# Patient Record
Sex: MALE | Race: WHITE | NOT HISPANIC OR LATINO | Employment: FULL TIME | URBAN - METROPOLITAN AREA
[De-identification: names, ages, dates, MRNs, and addresses within clinical notes are randomized per-mention and may not be internally consistent; named-entity substitution may affect disease eponyms.]

---

## 2017-01-15 ENCOUNTER — HOSPITAL ENCOUNTER (EMERGENCY)
Facility: CLINIC | Age: 42
Discharge: HOME | End: 2017-01-15
Attending: EMERGENCY MEDICINE | Admitting: EMERGENCY MEDICINE
Payer: OTHER MISCELLANEOUS

## 2017-01-15 VITALS
SYSTOLIC BLOOD PRESSURE: 134 MMHG | RESPIRATION RATE: 16 BRPM | DIASTOLIC BLOOD PRESSURE: 83 MMHG | OXYGEN SATURATION: 98 % | TEMPERATURE: 95.5 F

## 2017-01-15 DIAGNOSIS — T14.8XXA SCRATCH: ICD-10-CM

## 2017-01-15 PROCEDURE — 99283 EMERGENCY DEPT VISIT LOW MDM: CPT | Performed by: EMERGENCY MEDICINE

## 2017-01-15 PROCEDURE — 25000132 ZZH RX MED GY IP 250 OP 250 PS 637: Performed by: EMERGENCY MEDICINE

## 2017-01-15 PROCEDURE — 99283 EMERGENCY DEPT VISIT LOW MDM: CPT | Mod: Z6 | Performed by: EMERGENCY MEDICINE

## 2017-01-15 RX ORDER — BACITRACIN ZINC 500 [USP'U]/G
OINTMENT TOPICAL ONCE
Status: COMPLETED | OUTPATIENT
Start: 2017-01-15 | End: 2017-01-15

## 2017-01-15 RX ADMIN — BACITRACIN ZINC: 500 OINTMENT TOPICAL at 12:31

## 2017-01-15 ASSESSMENT — ENCOUNTER SYMPTOMS: WOUND: 1

## 2017-01-15 NOTE — ED AVS SNAPSHOT
[facility], [place], Emergency Department    [address] [place] [address]    Phone:  [phone]    Fax:  [phone]                                       Mialn Carrillo   MRN: [id number]    Department:  [facility], Emergency Department   Date of Visit:  1/15/2017           After Visit Summary Signature Page     I have received my discharge instructions, and my questions have been answered. I have discussed any challenges I see with this plan with the nurse or doctor.    ..........................................................................................................................................  Patient/Patient Representative Signature      ..........................................................................................................................................  Patient Representative Print Name and Relationship to Patient    ..................................................               ................................................  Date                                            Time    ..........................................................................................................................................  Reviewed by Signature/Title    ...................................................              ..............................................  Date                                                            Time

## 2017-01-15 NOTE — ED AVS SNAPSHOT
[facility], Emergency Department    [address]    [place] [address]    Phone:  [phone]    Fax:  [phone]                                       Milan Carrillo   MRN: [id number]    Department:  [facility], Emergency Department   Date of Visit:  1/15/2017           Patient Information     Date Of Birth          1975        Your diagnoses for this visit were:     Scratch        You were seen by Jon Altamirano MD.      Follow-up Information     Follow up with Laney Nino MD.    Specialty:  Student in organized health care education/training program    Why:  As needed    Contact information:    [facility]  [address] [facility] [address] [phone]          Discharge Instructions       Keep the wound clean.  Follow up with your doctor as needed.    24 Hour Appointment Hotline       To make an appointment at any [facility], call [phone] ([phone]). If you don't have a family doctor or clinic, we will help you find one. [facility] are conveniently located to serve the needs of you and your family.             Review of your medicines      Notice     You have not been prescribed any medications.            Orders Needing Specimen Collection     None      Pending Results     No orders found from 1/14/2017 to 1/16/2017.            Pending Culture Results     No orders found from 1/14/2017 to 1/16/2017.            Thank you for choosing [facility]       Thank you for choosing [facility] for your care. Our goal is always to provide you with excellent care. Hearing back from our patients is one way we can continue to improve our services. Please take a few minutes to complete the written survey that you may receive in the mail after you visit with us. Thank you!        [employer] Information     [email or web address] gives you secure access to your electronic health record. If you see a primary care provider, you can also send messages to your care team and  make appointments. If you have questions, please call your primary care clinic.  If you do not have a primary care provider, please call [phone] and they will assist you.        Care EveryWhere ID     This is your Care EveryWhere ID. This could be used by other organizations to access your [facility] medical records  [id number]        After Visit Summary       This is your record. Keep this with you and show to your community pharmacist(s) and doctor(s) at your next visit.

## 2017-01-15 NOTE — ED PROVIDER NOTES
History     Chief Complaint   Patient presents with     Body Fluid Exposure     Patient was part of a Code 21 response and got scratched on right inner arm     HPI  Milan Carrillo is a 41 year old male, with who presents with body fluid exposure. 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 While putting the girl in seclusion, the patient was scratched on the right ventral wrist by the patient's finger nails, which crow blood. The girl who scratched the patient is currently having blood work performed. Here in the ED, patient notes wound to the right wrist where he was scratched, but denies other problems at this time.     Past Medical History   Diagnosis Date     Sleep apnea      Kidney stone        Past Surgical History   Procedure Laterality Date     Eye surgery       Orthopedic surgery       R shoulder     Ent surgery       Recession resection (repair strabismus)  10/1/2012     Procedure: RECESSION RESECTION (REPAIR STRABISMUS);  RIGHT STRABISMUS REPAIR;  Surgeon: Joanie Gurrola MD;  Location: [facility]     Strabismus surgery         Family History   Problem Relation Age of Onset     Glaucoma No family hx of      Macular Degeneration No family hx of        Social History   Substance Use Topics     Smoking status: Never Smoker      Smokeless tobacco: Never Used     Alcohol Use: No       No current facility-administered medications for this encounter.     No current outpatient prescriptions on file.      No Known Allergies       I have reviewed the Medications, Allergies, Past Medical and Surgical History, and Social History in the Epic system.    Review of Systems   Skin: Positive for wound.   All other systems reviewed and are negative.      Physical Exam   BP: 134/83 mmHg  Heart Rate: 107  Temp: 95.5  F (35.3  C)  Resp: 16  SpO2: 98 %  Physical Exam   Constitutional: He appears well-developed and well-nourished. No distress.    HENT:   Head: Normocephalic.   Eyes: Right eye exhibits no discharge. Left eye exhibits no discharge.   Cardiovascular: Normal rate.    No murmur heard.  Pulmonary/Chest: Effort normal. No stridor. No respiratory distress. He has no wheezes.   Abdominal: Soft. He exhibits no distension. There is no tenderness.   Musculoskeletal:        Hands:  Neurological: He is alert. No cranial nerve deficit.   Skin: Skin is warm. He is not diaphoretic.       ED Course   Procedures   12:01 PM  The patient was seen and examined by Jon Altamirano MD in Room 07.       No results found for this or any previous visit (from the past 24 hour(s)).    Assessments & Plan (with Medical Decision Making)   Milan Carrillo is a 41 year old male who is presenting to the ED after he was scratched by a patient. No body fluid exposure. We discuss the low risk of transmission and he does not want any meds. Tetanus is up to date <5 years. Bacitracin applied.    I have reviewed the nursing notes.    I have reviewed the findings, diagnosis, plan and need for follow up with the patient.    There are no discharge medications for this patient.      Final diagnoses:   Scratch       1/15/2017   [facility], EMERGENCY DEPARTMENT    IFletcher, am serving as a trained medical scribe to document services personally performed by Jon Altamirano MD, based on the provider's statements to me.   IAdarsh Anst Bidry, MD, was physically present and have reviewed and verified the accuracy of this note documented by Fletcher Nassar.     Jon Altamirano MD  01/15/17 [id number]    Jon Altamirano MD  01/15/17 7605

## 2017-05-30 ENCOUNTER — OFFICE VISIT (OUTPATIENT)
Dept: FAMILY MEDICINE | Facility: CLINIC | Age: 42
End: 2017-05-30

## 2017-05-30 VITALS
HEIGHT: 72 IN | WEIGHT: 261 LBS | HEART RATE: 94 BPM | DIASTOLIC BLOOD PRESSURE: 89 MMHG | BODY MASS INDEX: 35.35 KG/M2 | TEMPERATURE: 98.9 F | RESPIRATION RATE: 18 BRPM | OXYGEN SATURATION: 98 % | SYSTOLIC BLOOD PRESSURE: 142 MMHG

## 2017-05-30 DIAGNOSIS — Z13.220 SCREENING FOR LIPID DISORDERS: Primary | ICD-10-CM

## 2017-05-30 DIAGNOSIS — K42.9 UMBILICAL HERNIA WITHOUT OBSTRUCTION AND WITHOUT GANGRENE: ICD-10-CM

## 2017-05-30 DIAGNOSIS — Z13.1 SCREENING FOR DIABETES MELLITUS: ICD-10-CM

## 2017-05-30 DIAGNOSIS — Z70.8 SEXUALLY TRANSMITTED DISEASE COUNSELING: ICD-10-CM

## 2017-05-30 LAB — HBA1C MFR BLD: 5.4 % (ref 4.1–5.7)

## 2017-05-30 NOTE — PATIENT INSTRUCTIONS
Here is the plan from today's visit    1. Screening for lipid disorders  We will check your cholesterol today.  - Lipid Cascade ([place])    2. Screening for diabetes mellitus  We will check you for diabetes today (and your risk of getting diabetes)  - Hemoglobin A1c ([place])    3. Sexually transmitted disease counseling  We will check you for STIs today.  - Neisseria gonorrhoeae PCR  - Chlamydia trachomatis PCR  - HIV Antigen Antibody Combo  - Anti Treponema      4. Umbilical hernia without obstruction and without gangrene  I have put in a referral to general surgery.  They will call you to make an appointment.  - GENERAL SURGERY ADULT REFERRAL - INTERNAL    Follow up plan  Please make a clinic appointment for follow up with me (SHIN NORWOOD) as needed.  If your blood pressure continues to be elevated, please return to clinic.  The top number should be less than 140 and bottom number less than 90.  I do not recommend taking the phenteramine for more 6 months.      Thank you for coming to [facility] today.  Lab Testing:  **If you had lab testing today and your results are reassuring or normal they will be mailed to you or sent through [employer] within 7 days.   **If the lab tests need quick action we will call you with the results.  The phone number we will call with results is # [phone] (home) [phone] (work). If this is not the best number please call our clinic and change the number.  Medication Refills:  If you need any refills please call your pharmacy and they will contact us.   If you need to  your refill at a new pharmacy, please contact the new pharmacy directly. The new pharmacy will help you get your medications transferred faster.   Scheduling:  If you have any concerns about today's visit or wish to schedule another appointment please call our office during normal business hours [phone] (8-5:00 M-F)  If a referral was made to a [facility] and  you don't get a call from central scheduling please call [phone].  If a Mammogram was ordered for you at [facility] call [phone] to schedule or change your appointment.  If you had an XRay/CT/Ultrasound/MRI ordered the number is [phone] to schedule or change your radiology appointment.   Medical Concerns:  If you have urgent medical concerns please call [phone] at any time of the day.    Preventive Health Recommendations  Male Ages 40 to 49    Yearly exam:             See your health care provider every year in order to  o   Review health changes.   o   Discuss preventive care.    o   Review your medicines if your doctor has prescribed any.    You should be tested each year for STDs (sexually transmitted diseases) if you re at risk.     Have a cholesterol test every 5 years.     Have a colonoscopy (test for colon cancer) if someone in your family has had colon cancer or polyps before age 50.     After age 45, have a diabetes test (fasting glucose). If you are at risk for diabetes, you should have this test every 3 years.      Talk with your health care provider about whether or not a prostate cancer screening test (PSA) is right for you.    Shots: Get a flu shot each year. Get a tetanus shot every 10 years.     Nutrition:    Eat at least 5 servings of fruits and vegetables daily.     Eat whole-grain bread, whole-wheat pasta and brown rice instead of white grains and rice.     Talk to your provider about Calcium and Vitamin D.     Lifestyle    Exercise for at least 150 minutes a week (30 minutes a day, 5 days a week). This will help you control your weight and prevent disease.     Limit alcohol to one drink per day.     No smoking.     Wear sunscreen to prevent skin cancer.     See your dentist every six months for an exam and cleaning.      General Surgery referral  Hello!           Contacted pt twice, left 2 VM's.          Thanks!     [place]     Sent letter to patient home

## 2017-05-30 NOTE — MR AVS SNAPSHOT
After Visit Summary   5/30/2017    Milan Carrillo    MRN: [id number]           Patient Information     Date Of Birth          1975        Visit Information        Provider Department      5/30/2017 4:20 PM Laney Norwood MD [facility]        Today's Diagnoses     Screening for lipid disorders    -  1    Screening for diabetes mellitus        Sexually transmitted disease counseling        Umbilical hernia without obstruction and without gangrene          Care Instructions    Here is the plan from today's visit    1. Screening for lipid disorders  We will check your cholesterol today.  - Lipid Cascade ([place])    2. Screening for diabetes mellitus  We will check you for diabetes today (and your risk of getting diabetes)  - Hemoglobin A1c ([facility])    3. Sexually transmitted disease counseling  We will check you for STIs today.  - Neisseria gonorrhoeae PCR  - Chlamydia trachomatis PCR  - HIV Antigen Antibody Combo  - Anti Treponema      4. Umbilical hernia without obstruction and without gangrene  I have put in a referral to general surgery.  They will call you to make an appointment.  - GENERAL SURGERY ADULT REFERRAL - INTERNAL    Follow up plan  Please make a clinic appointment for follow up with me (LANEY NORWOOD) as needed.  If your blood pressure continues to be elevated, please return to clinic.  The top number should be less than 140 and bottom number less than 90.  I do not recommend taking the phenteramine for more 6 months.      Thank you for coming to [facility] today.  Lab Testing:  **If you had lab testing today and your results are reassuring or normal they will be mailed to you or sent through [employer] within 7 days.   **If the lab tests need quick action we will call you with the results.  The phone number we will call with results is # [phone] (home) [phone] (work). If this is not the best number please call our clinic and change  the number.  Medication Refills:  If you need any refills please call your pharmacy and they will contact us.   If you need to  your refill at a new pharmacy, please contact the new pharmacy directly. The new pharmacy will help you get your medications transferred faster.   Scheduling:  If you have any concerns about today's visit or wish to schedule another appointment please call our office during normal business hours [phone] (8-5:00 M-F)  If a referral was made to a [facility] and you don't get a call from central scheduling please call [phone].  If a Mammogram was ordered for you at [facility] call [phone] to schedule or change your appointment.  If you had an XRay/CT/Ultrasound/MRI ordered the number is [phone] to schedule or change your radiology appointment.   Medical Concerns:  If you have urgent medical concerns please call [phone] at any time of the day.    Preventive Health Recommendations  Male Ages 40 to 49    Yearly exam:             See your health care provider every year in order to  o   Review health changes.   o   Discuss preventive care.    o   Review your medicines if your doctor has prescribed any.    You should be tested each year for STDs (sexually transmitted diseases) if you re at risk.     Have a cholesterol test every 5 years.     Have a colonoscopy (test for colon cancer) if someone in your family has had colon cancer or polyps before age 50.     After age 45, have a diabetes test (fasting glucose). If you are at risk for diabetes, you should have this test every 3 years.      Talk with your health care provider about whether or not a prostate cancer screening test (PSA) is right for you.    Shots: Get a flu shot each year. Get a tetanus shot every 10 years.     Nutrition:    Eat at least 5 servings of fruits and vegetables daily.     Eat whole-grain bread, whole-wheat pasta and brown rice instead of white grains and rice.      Talk to your provider about Calcium and Vitamin D.     Lifestyle    Exercise for at least 150 minutes a week (30 minutes a day, 5 days a week). This will help you control your weight and prevent disease.     Limit alcohol to one drink per day.     No smoking.     Wear sunscreen to prevent skin cancer.     See your dentist every six months for an exam and cleaning.              Follow-ups after your visit        Additional Services     GENERAL SURGERY ADULT REFERRAL - INTERNAL       Your provider has referred you to: [facility]: General Surgery Clinic [facility] [phone]   [email or web address]    Please be aware that coverage of these services is subject to the terms and limitations of your health insurance plan.  Call member services at your health plan with any benefit or coverage questions.      Please bring the following with you to your appointment:    (1) Any X-Rays, CTs or MRIs which have been performed.  Contact the facility where they were done to arrange for  prior to your scheduled appointment.   (2) List of current medications   (3) This referral request   (4) Any documents/labs given to you for this referral                  Who to contact     Please call your clinic at [phone] to:    Ask questions about your health    Make or cancel appointments    Discuss your medicines    Learn about your test results    Speak to your doctor   If you have compliments or concerns about an experience at your clinic, or if you wish to file a complaint, please contact [facility] Patient Relations at [phone] or email us at [email or web address]         Additional Information About Your Visit        [employer] Information     [email or web address] gives you secure access to your electronic health record. If you see a primary care provider, you can also send messages to your care team and make appointments. If you have questions, please call  your primary care clinic.  If you do not have a primary care provider, please call [phone] and they will assist you.      [employer] is an electronic gateway that provides easy, online access to your medical records. With [employer], you can request a clinic appointment, read your test results, renew a prescription or communicate with your care team.     To access your existing account, please contact your [facility] or call [phone] for assistance.        Care EveryWhere ID     This is your Care EveryWhere ID. This could be used by other organizations to access your [place] medical records  [id number]        Your Vitals Were     Pulse Temperature Respirations Height Pulse Oximetry BMI (Body Mass Index)    94 98.9  F (37.2  C) (Oral) 18 6' (182.9 cm) 98% 35.4 kg/m2       Blood Pressure from Last 3 Encounters:   05/30/17 142/89   01/15/17 134/83   03/31/16 (!) 150/99    Weight from Last 3 Encounters:   05/30/17 261 lb (118.4 kg)   02/23/16 264 lb (119.7 kg)   09/18/15 261 lb 12.8 oz (118.8 kg)              We Performed the Following     Anti Treponema     Chlamydia trachomatis PCR     GENERAL SURGERY ADULT REFERRAL - INTERNAL     Hemoglobin A1c ([place])     HIV Antigen Antibody Combo     Lipid [place] ([place])     Neisseria gonorrhoeae PCR        Primary Care Provider Office Phone # Fax #    Laney Nino [phone]       [facility] [address] [facility] [address]        Thank you!     Thank you for choosing [facility]  for your care. Our goal is always to provide you with excellent care. Hearing back from our patients is one way we can continue to improve our services. Please take a few minutes to complete the written survey that you may receive in the mail after your visit with us. Thank you!             Your Updated Medication List - Protect others around you: Learn how to safely use, store and throw away  your medicines at www.disposemymeds.org.      Notice  As of 5/30/2017  5:08 PM    You have not been prescribed any medications.

## 2017-05-30 NOTE — PROGRESS NOTES
Preceptor Attestation:   Patient seen and discussed with the resident. Assessment and plan reviewed with resident and agreed upon.   Supervising Physician:  Reena Butterfield MD  [facility]

## 2017-05-30 NOTE — PROGRESS NOTES
"  Male Physical Note          HPI         Concerns today: Worried about Mom's disease (autoimmune disease).  Would like to get tested, but not sure what her diagnosis was.  Patient has no specific complaints currently.      Patient is complaining of dysuria that has been going on for a few days (3 days).  Last partner was \"high risk\".  Patient thinks he may have been exposed to a STI.  Patient has had similar symptoms in the past (negative STI testing) that went away on its own.  No penile discharge, no rashes.        Patient Active Problem List   Diagnosis     Libido, decreased     Constipation     Malaise and fatigue     Hyperlipidemia     Obesity     Nonspecific elevation of levels of transaminase or lactic acid dehydrogenase (LDH)       Past Medical History:   Diagnosis Date     Kidney stone      Sleep apnea        Previous Medical Care      Family History   Problem Relation Age of Onset     Glaucoma No family hx of      Macular Degeneration No family hx of               Review of Systems:     Review of Systems:  CONSTITUTIONAL: NEGATIVE for fever, chills, change in weight  INTEGUMENTARY/SKIN: NEGATIVE for worrisome rashes, moles or lesions  EYES: NEGATIVE for vision changes or irritation.  Poor vision in right eye  ENT/MOUTH: NEGATIVE for ear, mouth and throat problems  RESP: NEGATIVE for significant cough or SOB  CV: NEGATIVE for chest pain, palpitations or peripheral edema  GI: NEGATIVE for nausea, abdominal pain, heartburn, or change in bowel habits  : NEGATIVE for frequency, or hematuria  MUSCULOSKELETAL: NEGATIVE for significant arthralgias or myalgia  NEURO: NEGATIVE for weakness, dizziness or paresthesias  ENDOCRINE: NEGATIVE for temperature intolerance, skin/hair changes  HEME/ALLERGY: NEGATIVE for bleeding problems  PSYCHIATRIC: NEGATIVE for changes in mood or affect    Sleep:   Do you snore most or the night (as reported by a family member)? Yes.  Patient has CPAP, but doesn't use it.  Do you feel " "sleepy or extremely tired during most of the day? No         Social History     Social History     Social History     Marital status:      Spouse name: N/A     Number of children: N/A     Years of education: N/A     Occupational History     Not on file.     Social History Main Topics     Smoking status: Never Smoker     Smokeless tobacco: Never Used     Alcohol use No     Drug use: No     Sexual activity: Not on file     Other Topics Concern     Not on file     Social History Narrative       Marital Status:  Who lives in your household? Lives alone with kids half time (ages 8 and 5)    Has anyone hurt you physically, for example by pushing, hitting, slapping or kicking you or forcing you to have sex? Denies  Do you feel threatened or controlled by a partner, ex-partner or anyone in your life? Denies    Sexual Health     Sexual concerns: No   STI History: Neg    Recommended Screening   Recommend STI testing, HIV testing, syphilis testing,   Patient requested cholesterol and A1c testing.  States tests were \"borderline\" last year.         Physical Exam:     Vitals: /89  Pulse 94  Temp 98.9  F (37.2  C) (Oral)  Resp 18  Ht 6' (182.9 cm)  Wt 261 lb (118.4 kg)  SpO2 98%  BMI 35.4 kg/m2  BMI= Body mass index is 35.4 kg/(m^2).  GENERAL: healthy, alert and no distress  EYES: Eyes grossly normal to inspection, extraocular movements - intact, and PERRL.  Right eye has erythema and scarring of medial sclera.    HENT: ear canals- normal; TMs- normal; Nose- normal; Mouth- no ulcers, no lesions  NECK: no tenderness, no adenopathy, no asymmetry, no masses, no stiffness; thyroid- normal to palpation  RESP: lungs clear to auscultation - no rales, no rhonchi, no wheezes  CV: regular rates and rhythm, normal S1 S2, no S3 or S4 and no murmur  ABDOMEN: soft, no tenderness, no  hepatosplenomegaly, no masses, normal bowel sounds.  Small reducible hernia around umbilicus.  MS: extremities- no gross deformities " noted, no edema  SKIN: no suspicious lesions, no rashes  NEURO: strength and tone- normal, sensory exam- grossly normal, mentation- intact, speech- normal, reflexes- symmetric  BACK: no CVA tenderness, no paralumbar tenderness  - genital exam deferred  RECTAL- exam deferred  PSYCH: Alert and oriented times 3; speech- coherent , normal rate and volume; able to articulate logical thoughts, able to abstract reason, no tangential thoughts, no hallucinations or delusions, affect- normal  LYMPHATICS: ant. cervical- normal, post. cervical- normal, axillary- normal, supraclavicular- normal, inguinal- normal    Assessment and Plan     Milan was seen today for physical.    Milan had no specific complaints other than concerns about a STI.  He does have dysuria, but no penile discharge.  Differential includes STI (GC/CT).  Patient may also have herpes or syphilis, but this would be less likely to present with dysuria.  A UTI seems unlikely since patient is a male, but would consider UA/urine culture if STI testing is negative.    Patient also requested cholesterol testing and A1c testing.  Will perform today per request.  Patient also requested a surgery referral to have his umbilical hernia repaired.      Patient noted to be slightly hypertensive during his clinic visit.  He states he was nervous about being late.  He goes to a clinic to get phenteramine (for weight loss) and states it is normally 120s/70s there.  I advised him to continue checking his blood pressure and RTC if it remains elevated (>140/90).  I also recommended not to take phenteramine long term, as this is most likely contributing to his elevated blood pressure.    Diagnoses and all orders for this visit:    Screening for lipid disorders  -     Lipid [place] ([facility])    Screening for diabetes mellitus  -     Hemoglobin A1c ([facility])    Sexually transmitted disease counseling  -     Neisseria gonorrhoeae PCR  -     Chlamydia trachomatis PCR  -     HIV  Antigen Antibody Combo  -     Anti Treponema    Umbilical hernia without obstruction and without gangrene  -     GENERAL SURGERY ADULT REFERRAL - INTERNAL    Options for treatment and follow-up care were reviewed with the patient. Milan Carrillo and/or guardian engaged in the decision making process and verbalized understanding of the options discussed and agreed with the final plan.    Laney Nino M.D.  PGY-2, Family Medicine    Will plan to release results into [employer]

## 2017-05-30 NOTE — LETTER
Nancie 3, 2017      Milan Carrillo  [address] [place] [address]        Dear Milan,    Thank you for getting your care at [facility]. Please see below for your test results.    Resulted Orders   Lipid [place] ([facility])   Result Value Ref Range    Cholesterol 206.7 (H) 0.0 - 200.0 mg/dL    Cholesterol/HDL Ratio 5.9 (H) 0.0 - 5.0    HDL Cholesterol 35.1 (L) >40.0 mg/dL    LDL Cholesterol Calculated 124 0 - 129 mg/dL    Triglycerides 237.5 (H) 0.0 - 150.0 mg/dL    VLDL Cholesterol 47.5 (H) 7.0 - 32.0 mg/dL   Hemoglobin A1c ([facility])   Result Value Ref Range    Hemoglobin A1C 5.4 4.1 - 5.7 %   Neisseria gonorrhoeae PCR   Result Value Ref Range    Specimen Descrip Urine     N Gonorrhea PCR  NEG     Negative   Negative for N. gonorrhoeae rRNA by transcription mediated amplification.   A negative result by transcription mediated amplification does not preclude the   presence of N. gonorrhoeae infection because results are dependent on proper   and adequate collection, absence of inhibitors, and sufficient rRNA to be   detected.     Chlamydia trachomatis PCR   Result Value Ref Range    Specimen Description Urine     Chlamydia Trachomatis PCR  NEG     Negative   Negative for C. trachomatis rRNA by transcription mediated amplification.   A negative result by transcription mediated amplification does not preclude the   presence of C. trachomatis infection because results are dependent on proper   and adequate collection, absence of inhibitors, and sufficient rRNA to be   detected.     HIV Antigen Antibody Combo   Result Value Ref Range    HIV Antigen Antibody Combo  NR     Nonreactive   HIV-1 p24 Ag & HIV-1/HIV-2 Ab Not Detected     Anti Treponema   Result Value Ref Range    Treponema pallidum Antibody Negative NEG     Your STI testing was negative.  If you continue to have urinary symptoms, please return to clinic.    Your diabetes test was normal, but your cholesterol was mildly elevated.  I recommend diet  and exercise for now, but we can recheck next year.  There is no need to start medications now.      If you have any concerns about these results please call and leave a message for me or send a Phizzlet message to the clinic.    Sincerely,    Laney Nino MD

## 2017-05-31 LAB
CHOLEST SERPL-MCNC: 206.7 MG/DL (ref 0–200)
CHOLEST/HDLC SERPL: 5.9 {RATIO} (ref 0–5)
HDLC SERPL-MCNC: 35.1 MG/DL
LDLC SERPL CALC-MCNC: 124 MG/DL (ref 0–129)
TRIGL SERPL-MCNC: 237.5 MG/DL (ref 0–150)
VLDL CHOLESTEROL: 47.5 MG/DL (ref 7–32)

## 2017-06-01 LAB
C TRACH DNA SPEC QL NAA+PROBE: NORMAL
HIV 1+2 AB+HIV1 P24 AG SERPL QL IA: NORMAL
N GONORRHOEA DNA SPEC QL NAA+PROBE: NORMAL
SPECIMEN SOURCE: NORMAL
SPECIMEN SOURCE: NORMAL
T PALLIDUM IGG+IGM SER QL: NEGATIVE

## 2019-02-07 ENCOUNTER — HOSPITAL ENCOUNTER (EMERGENCY)
Facility: CLINIC | Age: 44
Discharge: HOME | End: 2019-02-07
Attending: EMERGENCY MEDICINE | Admitting: EMERGENCY MEDICINE
Payer: OTHER MISCELLANEOUS

## 2019-02-07 VITALS
OXYGEN SATURATION: 93 % | TEMPERATURE: 97.9 F | HEART RATE: 74 BPM | RESPIRATION RATE: 18 BRPM | DIASTOLIC BLOOD PRESSURE: 94 MMHG | SYSTOLIC BLOOD PRESSURE: 128 MMHG

## 2019-02-07 DIAGNOSIS — S60.811A ABRASION OF RIGHT WRIST: ICD-10-CM

## 2019-02-07 DIAGNOSIS — Z57.8 EMPLOYEE EXPOSURE TO BLOOD: ICD-10-CM

## 2019-02-07 DIAGNOSIS — S61.216A LACERATION OF RIGHT LITTLE FINGER WITHOUT FOREIGN BODY WITHOUT DAMAGE TO NAIL: ICD-10-CM

## 2019-02-07 DIAGNOSIS — Z23 NEED FOR PROPHYLACTIC VACCINATION AND INOCULATION AGAINST CHOLERA ALONE: ICD-10-CM

## 2019-02-07 PROCEDURE — 90471 IMMUNIZATION ADMIN: CPT

## 2019-02-07 PROCEDURE — 90715 TDAP VACCINE 7 YRS/> IM: CPT | Performed by: EMERGENCY MEDICINE

## 2019-02-07 PROCEDURE — 99282 EMERGENCY DEPT VISIT SF MDM: CPT | Mod: 25

## 2019-02-07 PROCEDURE — 25000128 H RX IP 250 OP 636: Performed by: EMERGENCY MEDICINE

## 2019-02-07 PROCEDURE — 99283 EMERGENCY DEPT VISIT LOW MDM: CPT | Mod: Z6 | Performed by: EMERGENCY MEDICINE

## 2019-02-07 RX ORDER — PHENTERMINE HYDROCHLORIDE 37.5 MG/1
37.5 TABLET ORAL
COMMUNITY
End: 2021-07-26

## 2019-02-07 RX ADMIN — CLOSTRIDIUM TETANI TOXOID ANTIGEN (FORMALDEHYDE INACTIVATED), CORYNEBACTERIUM DIPHTHERIAE TOXOID ANTIGEN (FORMALDEHYDE INACTIVATED), BORDETELLA PERTUSSIS TOXOID ANTIGEN (GLUTARALDEHYDE INACTIVATED), BORDETELLA PERTUSSIS FILAMENTOUS HEMAGGLUTININ ANTIGEN (FORMALDEHYDE INACTIVATED), BORDETELLA PERTUSSIS PERTACTIN ANTIGEN, AND BORDETELLA PERTUSSIS FIMBRIAE 2/3 ANTIGEN 0.5 ML: 5; 2; 2.5; 5; 3; 5 INJECTION, SUSPENSION INTRAMUSCULAR at 21:31

## 2019-02-07 RX ADMIN — Medication 1 PACKAGE: at 22:18

## 2019-02-07 SDOH — HEALTH STABILITY - PHYSICAL HEALTH: OCCUPATIONAL EXPOSURE TO OTHER RISK FACTORS: Z57.8

## 2019-02-07 ASSESSMENT — ENCOUNTER SYMPTOMS
NUMBNESS: 0
WEAKNESS: 0
WOUND: 1

## 2019-02-07 NOTE — ED AVS SNAPSHOT
[facility], [place], Emergency Department  [address]  [place] [address]  Phone:  [phone]  Fax:  [phone]                                    Milan Carrillo   MRN: [id number]    Department:  [facility], Emergency Department   Date of Visit:  2/7/2019           After Visit Summary Signature Page    I have received my discharge instructions, and my questions have been answered. I have discussed any challenges I see with this plan with the nurse or doctor.    ..........................................................................................................................................  Patient/Patient Representative Signature      ..........................................................................................................................................  Patient Representative Print Name and Relationship to Patient    ..................................................               ................................................  Date                                   Time    ..........................................................................................................................................  Reviewed by Signature/Title    ...................................................              ..............................................  Date                                               Time          22EPIC Rev 08/18

## 2019-02-08 NOTE — ED PROVIDER NOTES
History     Chief Complaint   Patient presents with     Hand Pain     right pinky finger injury     HPI  Milan Carrillo is a 43 year old right-hand dominant male who presents to the emergency department with a right fifth finger and right wrist wound after a Code 21 situation on a health unit today.  The patient states that he was scratched through his glove hand on the right fifth finger.  He also sustained a scratch on the right wrist that was not covered by a glove.  The source patient is diabetic and has undergone fingerstick testing.  There is concerned that the patient may have been exposed to the source patient's blood during the scratching incident.  The patient did undergo hep B series.  He  was considered immune to infection with hepatitis B by testing on 6/2/10.  The source patien did have a negative HIV test in September 2017 and has no known history of IV drug use.  She is not consenting to a blood draw.    I have reviewed the Medications, Allergies, Past Medical and Surgical History, and Social History in the Epic system.    Review of Systems   Skin: Positive for wound.   Neurological: Negative for weakness and numbness.   All other systems reviewed and are negative.      Physical Exam   BP: 134/90  Pulse: 74  Heart Rate: 84  Temp: 96.1  F (35.6  C)  Resp: 18  SpO2: 95 %      Physical Exam   Constitutional: He appears well-developed and well-nourished.   Cardiovascular: Intact distal pulses.   Musculoskeletal:        Right hand: He exhibits normal range of motion and normal capillary refill. Normal sensation noted. Normal strength noted.        Hands:  Neurological: He has normal strength. No sensory deficit.   Skin: Capillary refill takes less than 2 seconds.   Nursing note and vitals reviewed.      ED Course        Procedures            Critical Care time:     Nursing supervisor here.  Source patient not consenting to blood draw.    Discussed with ID staff.  Will initiate postexposure prophylaxis  until source patient can be drawn.           Assessments & Plan (with Medical Decision Making)   43 year old right-hand-dominant male to the emergency department with 2 superficial cutaneous injury sustained during a code 21 this evening.  The injury on his fifth finger was through a gloved hand with no violation of the glove.  The injury on his wrist was direct patient contact.  There is concerned that the patient may have had her blood on her fingers that she is a diabetic and has undergone fingerstick glucose testing.  Additionally, the source patient is not consenting to blood draw for rapid HIV testing.  The patient is hep B immune.  He was offered in and initiated postexposure prophylaxis here in the emergency department.  He was provided a starter kit.  He will follow-up with employee health tomorrow.    I have reviewed the nursing notes.    I have reviewed the findings, diagnosis, plan and need for follow up with the patient.       Medication List      Started    emtricitabine-tenofovir (TRUVADA) 200 mg-300 mg PLUS dolutegravir (TIVICAY) 50 mg Tabs ED starter pack  1 Package, As instructed, ONCE            Final diagnoses:   Employee exposure to blood       2/7/2019   [facility], [place], EMERGENCY DEPARTMENT     Kavon Cueva MD  02/07/19 [id number]

## 2019-02-08 NOTE — ED TRIAGE NOTES
Pt is an employee here and was recently in a code 21 on [employer]. Right pinky finger got injured during the code.

## 2019-02-08 NOTE — DISCHARGE INSTRUCTIONS
Take Truvada and Tivicay as directed.  Follow-up with Employee Health tomorrow.    Return to the ED if any concerns.

## 2019-06-17 ENCOUNTER — HOSPITAL ENCOUNTER (EMERGENCY)
Facility: CLINIC | Age: 44
Discharge: HOME | End: 2019-06-17
Attending: EMERGENCY MEDICINE | Admitting: EMERGENCY MEDICINE
Payer: COMMERCIAL

## 2019-06-17 ENCOUNTER — APPOINTMENT (OUTPATIENT)
Dept: GENERAL RADIOLOGY | Facility: CLINIC | Age: 44
End: 2019-06-17
Attending: EMERGENCY MEDICINE
Payer: COMMERCIAL

## 2019-06-17 VITALS
SYSTOLIC BLOOD PRESSURE: 124 MMHG | BODY MASS INDEX: 37.32 KG/M2 | RESPIRATION RATE: 16 BRPM | WEIGHT: 275.19 LBS | TEMPERATURE: 97 F | OXYGEN SATURATION: 95 % | DIASTOLIC BLOOD PRESSURE: 82 MMHG | HEART RATE: 66 BPM

## 2019-06-17 DIAGNOSIS — S46.212A TEAR OF LEFT BICEPS MUSCLE, INITIAL ENCOUNTER: ICD-10-CM

## 2019-06-17 PROCEDURE — 99283 EMERGENCY DEPT VISIT LOW MDM: CPT | Performed by: EMERGENCY MEDICINE

## 2019-06-17 PROCEDURE — 99284 EMERGENCY DEPT VISIT MOD MDM: CPT | Mod: Z6 | Performed by: EMERGENCY MEDICINE

## 2019-06-17 PROCEDURE — 25000132 ZZH RX MED GY IP 250 OP 250 PS 637: Performed by: EMERGENCY MEDICINE

## 2019-06-17 PROCEDURE — 73080 X-RAY EXAM OF ELBOW: CPT | Mod: LT

## 2019-06-17 RX ORDER — OXYCODONE HYDROCHLORIDE 5 MG/1
5 TABLET ORAL ONCE
Status: COMPLETED | OUTPATIENT
Start: 2019-06-17 | End: 2019-06-17

## 2019-06-17 RX ORDER — OXYCODONE HYDROCHLORIDE 5 MG/1
5 TABLET ORAL EVERY 6 HOURS PRN
Qty: 12 TABLET | Refills: 0 | Status: SHIPPED | OUTPATIENT
Start: 2019-06-17 | End: 2020-05-11

## 2019-06-17 RX ORDER — IBUPROFEN 200 MG
600 TABLET ORAL 3 TIMES DAILY
Qty: 45 TABLET | Refills: 0 | Status: SHIPPED | OUTPATIENT
Start: 2019-06-17 | End: 2019-07-01

## 2019-06-17 RX ADMIN — OXYCODONE HYDROCHLORIDE 5 MG: 5 TABLET ORAL at 17:29

## 2019-06-17 NOTE — ED PROVIDER NOTES
History     Chief Complaint   Patient presents with     Arm Injury     States he was moving furniture today and heard a pop in his left elbow.  Staes he can feel his fingers     HPI  Milan Carrillo is a 44 year old male who states that he was lifting a mattress over his head in order to flip it over. Patient states that he was lifting it with his left arm while carrying it above his head when he felt pain in his left elbow medially. Patient states that the pain has persisted over the last 12 hours and he now presents to the ER for evaluation. Patient denies any other injury.     This part of the medical record was transcribed by Denise Barboza, Medical Scribe, from a dictation done by Srikanth Chacko MD.      I have reviewed the Medications, Allergies, Past Medical and Surgical History, and Social History in the [employer] system.    Past Medical History:   Diagnosis Date     Kidney stone      Sleep apnea        Past Surgical History:   Procedure Laterality Date     ENT SURGERY       EYE SURGERY       ORTHOPEDIC SURGERY      R shoulder     RECESSION RESECTION (REPAIR STRABISMUS)  10/1/2012    Procedure: RECESSION RESECTION (REPAIR STRABISMUS);  RIGHT STRABISMUS REPAIR;  Surgeon: Joanie Gurrola MD;  Location: [facility]     STRABISMUS SURGERY         Family History   Problem Relation Age of Onset     Glaucoma No family hx of      Macular Degeneration No family hx of        Social History     Tobacco Use     Smoking status: Never Smoker     Smokeless tobacco: Never Used   Substance Use Topics     Alcohol use: No        No Known Allergies    Review of Systems   Musculoskeletal:        Positive for left elbow pain.   All other systems reviewed and are negative.      Physical Exam   BP: 124/82  Pulse: 66  Temp: 97  F (36.1  C)  Resp: 16  Weight: 124.8 kg (275 lb 3 oz)  SpO2: 95 %      Physical Exam   Constitutional: He is oriented to person, place, and time.   Alert conversant and in some mild distress secondary to left elbow  pain   HENT:   Head: Atraumatic.   Eyes: Pupils are equal, round, and reactive to light. EOM are normal.   Neck: Neck supple.   Pulmonary/Chest: No respiratory distress.   Musculoskeletal:   Patient has some tenderness of the distal portion of the biceps in the antecubital fossa with some swelling more proximal to that but no clear disruption.  There is a suspected partial disruption.  Patient's elbow flexion is intact.  Distal CMS intact.   Neurological: He is alert and oriented to person, place, and time.   Skin: Skin is warm.   Psychiatric: He has a normal mood and affect.   Nursing note and vitals reviewed.      ED Course        Procedures        Results for orders placed or performed during the hospital encounter of 06/17/19   Elbow  XR, G/E 3 views, left    Narrative    LEFT ELBOW THREE VIEWS  6/17/2019 4:40 PM     HISTORY: Trauma.    COMPARISON: None.      Impression    IMPRESSION: Normal.    ITALO ALBERT MD       Assessments & Plan (with Medical Decision Making)     I have reviewed the nursing notes.    Medications   oxyCODONE (ROXICODONE) tablet 5 mg (5 mg Oral Given 6/17/19 1729)     Orthopedic referral ordered and MRI as an outpatient ordered    I have reviewed the findings, diagnosis, plan and need for follow up with the patient.       Medication List      Started    ibuprofen 200 MG tablet  Commonly known as:  ADVIL/MOTRIN  600 mg, Oral, 3 TIMES DAILY     oxyCODONE 5 MG tablet  Commonly known as:  ROXICODONE  5 mg, Oral, EVERY 6 HOURS PRN            Final diagnoses:   Tear of left biceps muscle, initial encounter - partial     Wear sling on your left arm for comfort.    Work note given    Ice as needed.    Call MRI at [phone] to schedule an outpatient MRI this week.    Please make an appointment to follow up with Orthopedics (phone: [phone]) to occur at least a day after your MRI for results and recheck.    Srikanth Chacko MD    6/17/2019   [facility], [place], EMERGENCY DEPARTMENT      Srikanth Chacko MD  06/17/19 8771

## 2019-06-17 NOTE — DISCHARGE INSTRUCTIONS
Wear sling on your left arm for comfort.    Ice as needed.    Call MRI at [phone] to schedule an outpatient MRI this week.    Please make an appointment to follow up with Orthopedics (phone: [phone]) to occur at least a day after your MRI for results and recheck.

## 2019-06-17 NOTE — ED AVS SNAPSHOT
[facility], [place], Emergency Department  [address]  [place] [address]  Phone:  [phone]  Fax:  [phone]                                    Milan Carrillo   MRN: [id number]    Department:  [facility], Emergency Department   Date of Visit:  6/17/2019           After Visit Summary Signature Page    I have received my discharge instructions, and my questions have been answered. I have discussed any challenges I see with this plan with the nurse or doctor.    ..........................................................................................................................................  Patient/Patient Representative Signature      ..........................................................................................................................................  Patient Representative Print Name and Relationship to Patient    ..................................................               ................................................  Date                                   Time    ..........................................................................................................................................  Reviewed by Signature/Title    ...................................................              ..............................................  Date                                               Time          22EPIC Rev 08/18

## 2019-06-17 NOTE — LETTER
June 17, 2019      To Whom It May Concern:      Milan Carrillo was seen in our Emergency Department today, 06/17/19.  I expect his condition to improve over the next week.  He may return to work/school on 6/19/19 but will be unable to use his left arm for 1 week.    Sincerely,        Srikanth Chacko MD, MD

## 2019-06-19 ENCOUNTER — TELEPHONE (OUTPATIENT)
Dept: ORTHOPEDICS | Facility: CLINIC | Age: 44
End: 2019-06-19

## 2019-06-19 NOTE — TELEPHONE ENCOUNTER
[facility] Call Center    Phone Message    May a detailed message be left on voicemail: yes    Reason for Call: Order(s): Other:   Reason for requested: MRI of bicep- pt is making sure that his MRI on 06/25 will be for his elbow AND bicep  Date needed: asap  Provider name:       Action Taken: Message routed to:  [facility] ([facility] ortho

## 2019-06-19 NOTE — TELEPHONE ENCOUNTER
RECORDS RECEIVED FROM: Internal Referral from Srikanth Chacko MD for Tear of left biceps muscle, initial encounter - Schedule Per PT    DATE RECEIVED: Jul 1, 2019    NOTES STATUS DETAILS   OFFICE NOTE from referring provider N/A    OFFICE NOTE from other specialist N/A    DISCHARGE SUMMARY from hospital N/A    DISCHARGE REPORT from the ER Internal 6/17/19   OPERATIVE REPORT N/A    MEDICATION LIST Internal    IMPLANT RECORD/STICKER N/A    LABS     CBC/DIFF N/A    CULTURES N/A    INJECTIONS DONE IN RADIOLOGY N/A    MRI In process, internal Scheduled for 6/25/19   CT SCAN N/A    XRAYS (IMAGES & REPORTS) Internal 6/17/19   TUMOR     PATHOLOGY  Slides & report N/A    '

## 2019-06-25 ENCOUNTER — HOSPITAL ENCOUNTER (OUTPATIENT)
Dept: MRI IMAGING | Facility: CLINIC | Age: 44
Discharge: HOME | End: 2019-06-25
Attending: EMERGENCY MEDICINE | Admitting: EMERGENCY MEDICINE
Payer: COMMERCIAL

## 2019-06-25 DIAGNOSIS — S46.212A TEAR OF LEFT BICEPS MUSCLE, INITIAL ENCOUNTER: ICD-10-CM

## 2019-06-25 PROCEDURE — 73221 MRI JOINT UPR EXTREM W/O DYE: CPT | Mod: LT

## 2019-07-01 ENCOUNTER — PRE VISIT (OUTPATIENT)
Dept: ORTHOPEDICS | Facility: CLINIC | Age: 44
End: 2019-07-01

## 2019-07-01 ENCOUNTER — OFFICE VISIT (OUTPATIENT)
Dept: ORTHOPEDICS | Facility: CLINIC | Age: 44
End: 2019-07-01
Attending: EMERGENCY MEDICINE
Payer: COMMERCIAL

## 2019-07-01 ENCOUNTER — HOSPITAL ENCOUNTER (OUTPATIENT)
Facility: CLINIC | Age: 44
End: 2019-07-01
Attending: ORTHOPAEDIC SURGERY | Admitting: ORTHOPAEDIC SURGERY
Payer: COMMERCIAL

## 2019-07-01 DIAGNOSIS — S46.212A BICEPS RUPTURE, DISTAL, LEFT, INITIAL ENCOUNTER: Primary | ICD-10-CM

## 2019-07-01 NOTE — NURSING NOTE
Reason For Visit:   Chief Complaint   Patient presents with     Consult For     Left biceps tear       Primary MD: Iesha Loera, Md    ?  No    Age: 44 year old    Occupation: Psych negron.  Currently working? Yes.  Work status?  Full time.  Date of injury: 6/17/19  Type of injury: Left biceps tear, moving - trying to carry a matress.  Date of surgery: NA  Type of surgery: NA.  Smoker: No  Request smoking cessation information: No      There were no vitals taken for this visit.      Pain Assessment  Patient Currently in Pain: Denies(Pain only with certain movements or activities)               QuickDASH Assessment  No flowsheet data found.       No Known Allergies    Rosalee Harrison, ATC

## 2019-07-01 NOTE — LETTER
2019       RE: Milan Carrillo  [address] [facility] [address]     Dear Colleague,    Thank you for referring your patient, Milan Carrillo, to the [facility] at [facility]. Please see a copy of my visit note below.    [facility]  Orthopedics  Mason Barnes MD  2019     Name: Milan Carrillo  MRN: [id number]  Age: 44 year old  : 1975  Referring provider: Srikanth Chacko     Chief Complaint: No chief complaint on file.    Date of Injury: 19    History of Present Illness:   Milan Carrillo is a 44 year old male who presents today for evaluation of left proximal elbow pain. The patient reports on 19 he was lifting a mattress over his head with his left arm when he suddenly experienced an onset of left Anterior elbow pain and pop crunch pop in the left elbow. Thought he may have ruptured his biceps. The patient was initially seen at the ED on 19. Initially, he got xrays and was placed in a sling. An MRI was subsequently ordered. Since this time, pain has been well-controlled without medication. There is no numbness/tingling. Endorses less pain since the initial injury but endorses loss of strength specifically with any weight bearing exercises on the left. Denies any significant loss of function or interference with normal ADLs. Of note, patient does endorse fevers of 101-102 yesterday. However does not feel sick. Denies any coughing, nausea or vomiting.         Review of Systems:   A 10-point review of systems was obtained and is negative except for as noted in the HPI.     Medications:     Current Outpatient Medications:      oxyCODONE (ROXICODONE) 5 MG tablet, Take 1 tablet (5 mg) by mouth every 6 hours as needed for pain, Disp: 12 tablet, Rfl: 0     phentermine (ADIPEX-P) 37.5 MG tablet, Take 37.5 mg by mouth every morning (before breakfast), Disp: , Rfl:     Allergies:  Patient has no known allergies.     Past Medical  History:  Kidney stone  Sleep apnea  Obesity     Past Surgical History:  ENT surgery  Eye surgery  Right shoulder surgery  Recession resection of right strabismus     Social History:  Presents to clinic with two daughters  Works as assistant on psych unit  Tobacco Use: denies  Alcohol Use: denies  PCP: [facility]    Family History:  No pertinent family history reported    Physical Examination:  There were no vitals taken for this visit.  General: Healthy appearing male. Affect appropriate. Normal gait. Alert and oriented to surroundings.   Left Upper Extremity:  Skin intact. Hook test with no palpable biceps tendon. Abnormal biceps contour c/w distal biceps rupture. TTP over antecubital fossa. mInimal swelling/ecchymosis.  Pain with resisted elbow flexion and resisted supination. Good flexion but poor supiantion strength. Full elbow ROM with elbow flexion, extension, pronation and supination. Neurovascularly intact. SILT m/r/u. 5/5 EPl/FPL/IO.         Imaging:   MRI of L elbow:  L Distal biceps tendon tear. Tendon is 6 cm retracted from the insertion.    I have independently reviewed the above imaging studies; the results were discussed with the patient.      Assessment:   44 year old right hand male with distal biceps tear. wwe discussed treatment options. Discussed that this can be treated surgically or nonsurgically. Without surgery, the tear will not heal itself. He may have some mild cramping with exertion but most people do not have significant persistent pain. I expect the discomfort he is having right now will improve. He will have a permanent  Loss of supination and flexion strength. With surgical fixation, this will entail probably 3 months during which he is not allowed to do any heavy lifting, strenuous activity, or patient restraint at work. We discussed surgical risks. However, this will be the best option to prevent substantial loss of supination and elbow flexion strength.    Plan:   I  discussed the risks of surgery including infection, bleeding, pain, scarring, damage to nerves, blood vessels, or other nearby structures, failure of fixation, stiffness, need for allograft, Wound healing difficulty, and anesthetic risks.  The patient expressed understanding and feels he would tentatively like to proceed with surgery. I'll put him on the schedule for Wednesday night. However, he would like to discuss further with his wife.he will let us know if this changes his plans. We did briefly discuss his fever. He will discuss this at his preoperative history and physicalto determine whether this would be a contraindication to surgery. From an orthopedic standpoint, as well as her is no evidence of cellulitis or wound infection, I would be comfoftable proceeding if felt to be safe from anesthesia standpoint.     Mason Barnes MD          Scribe Disclosure:  I, Angela Jaffe, am serving as a scribe to document services personally performed by Mason Barnes MD at this visit, based upon the provider's statements to me. All documentation has been reviewed by the aforementioned provider prior to being entered into the official medical record.

## 2019-07-01 NOTE — PROGRESS NOTES
[facility]  Orthopedics  Mason Barnes MD  2019     Name: Milan Carrillo  MRN: [id number]  Age: 44 year old  : 1975  Referring provider: Srikanth Chacko     Chief Complaint: No chief complaint on file.    Date of Injury: 19    History of Present Illness:   Milan Carrillo is a 44 year old male who presents today for evaluation of left proximal elbow pain. The patient reports on 19 he was lifting a mattress over his head with his left arm when he suddenly experienced an onset of left Anterior elbow pain and pop crunch pop in the left elbow. Thought he may have ruptured his biceps. The patient was initially seen at the ED on 19. Initially, he got xrays and was placed in a sling. An MRI was subsequently ordered. Since this time, pain has been well-controlled without medication. There is no numbness/tingling. Endorses less pain since the initial injury but endorses loss of strength specifically with any weight bearing exercises on the left. Denies any significant loss of function or interference with normal ADLs. Of note, patient does endorse fevers of 101-102 yesterday. However does not feel sick. Denies any coughing, nausea or vomiting.         Review of Systems:   A 10-point review of systems was obtained and is negative except for as noted in the HPI.     Medications:     Current Outpatient Medications:      oxyCODONE (ROXICODONE) 5 MG tablet, Take 1 tablet (5 mg) by mouth every 6 hours as needed for pain, Disp: 12 tablet, Rfl: 0     phentermine (ADIPEX-P) 37.5 MG tablet, Take 37.5 mg by mouth every morning (before breakfast), Disp: , Rfl:     Allergies:  Patient has no known allergies.     Past Medical History:  Kidney stone  Sleep apnea  Obesity     Past Surgical History:  ENT surgery  Eye surgery  Right shoulder surgery  Recession resection of right strabismus     Social History:  Presents to clinic with two daughters  Works as assistant on psych unit  Tobacco Use:  denies  Alcohol Use: denies  PCP: Md I[facility]    Family History:  No pertinent family history reported    Physical Examination:  There were no vitals taken for this visit.  General: Healthy appearing male. Affect appropriate. Normal gait. Alert and oriented to surroundings.   Left Upper Extremity:  Skin intact. Hook test with no palpable biceps tendon. Abnormal biceps contour c/w distal biceps rupture. TTP over antecubital fossa. mInimal swelling/ecchymosis.  Pain with resisted elbow flexion and resisted supination. Good flexion but poor supiantion strength. Full elbow ROM with elbow flexion, extension, pronation and supination. Neurovascularly intact. SILT m/r/u. 5/5 EPl/FPL/IO.         Imaging:   MRI of L elbow:  L Distal biceps tendon tear. Tendon is 6 cm retracted from the insertion.    I have independently reviewed the above imaging studies; the results were discussed with the patient.      Assessment:   44 year old right hand male with distal biceps tear. wwe discussed treatment options. Discussed that this can be treated surgically or nonsurgically. Without surgery, the tear will not heal itself. He may have some mild cramping with exertion but most people do not have significant persistent pain. I expect the discomfort he is having right now will improve. He will have a permanent  Loss of supination and flexion strength. With surgical fixation, this will entail probably 3 months during which he is not allowed to do any heavy lifting, strenuous activity, or patient restraint at work. We discussed surgical risks. However, this will be the best option to prevent substantial loss of supination and elbow flexion strength.    Plan:   I discussed the risks of surgery including infection, bleeding, pain, scarring, damage to nerves, blood vessels, or other nearby structures, failure of fixation, stiffness, need for allograft, Wound healing difficulty, and anesthetic risks.  The patient expressed understanding  and feels he would tentatively like to proceed with surgery. I'll put him on the schedule for Wednesday night. However, he would like to discuss further with his wife.he will let us know if this changes his plans. We did briefly discuss his fever. He will discuss this at his preoperative history and physicalto determine whether this would be a contraindication to surgery. From an orthopedic standpoint, as well as her is no evidence of cellulitis or wound infection, I would be comfoftable proceeding if felt to be safe from anesthesia standpoint.     Mason Barnes MD          Scribe Disclosure:  I, Angeal Jaffe, am serving as a scribe to document services personally performed by Mason Barnes MD at this visit, based upon the provider's statements to me. All documentation has been reviewed by the aforementioned provider prior to being entered into the official medical record.

## 2019-07-01 NOTE — NURSING NOTE
Teaching Flowsheet     Relevant Diagnosis: Left distal biceps rupture   Teaching Topic: Left distal biceps repair.  -[facility], or [facility] based on availability-  [facility] anesthesia.  Consent obtained.  BMI 37.52  Patient states he will have H&P with his PCP tomorrow.    Works in healthcare, patient stated he had + MRSA in the past in a wound, treated with antibiotics.    Person(s) involved in teaching:   Patient     Motivation Level:  Asks Questions: Yes  Eager to Learn: Yes  Cooperative: Yes  Receptive (willing/able to accept information): Yes  Any cultural factors/Anabaptist beliefs that may influence understanding or compliance? No       Patient demonstrates understanding of the following:  Reason for the appointment, diagnosis and treatment plan: Yes  Knowledge of proper use of medications and conditions for which they are ordered (with special attention to potential side effects or drug interactions): Yes  Which situations necessitate calling provider and whom to contact: Yes       Teaching Concerns Addressed:   Proper use and care of  (medical equip, care aids, etc.): NA  Nutritional needs and diet plan: Yes  Pain management techniques: Yes  Wound Care: Yes  How and/when to access community resources: Yes     Instructional Materials Used/Given: Preoperative teaching packet and antibacterial soap     Time spent with patient: 15 minutes.  Savannah Hidalgo RN

## 2019-07-02 RX ORDER — CLINDAMYCIN PHOSPHATE 900 MG/50ML
900 INJECTION, SOLUTION INTRAVENOUS
Status: CANCELLED | OUTPATIENT
Start: 2019-07-02

## 2019-07-02 RX ORDER — CLINDAMYCIN PHOSPHATE 900 MG/50ML
900 INJECTION, SOLUTION INTRAVENOUS SEE ADMIN INSTRUCTIONS
Status: CANCELLED | OUTPATIENT
Start: 2019-07-02

## 2019-07-03 ENCOUNTER — TELEPHONE (OUTPATIENT)
Dept: ORTHOPEDICS | Facility: CLINIC | Age: 44
End: 2019-07-03

## 2019-07-03 NOTE — TELEPHONE ENCOUNTER
Patient called to cancel surgery. Called him back to discuss and answer any questions but not available. Left message for patient. Called back at 3:25, no answer. Left meassage.

## 2019-11-06 ENCOUNTER — HEALTH MAINTENANCE LETTER (OUTPATIENT)
Age: 44
End: 2019-11-06

## 2020-04-15 ENCOUNTER — APPOINTMENT (OUTPATIENT)
Dept: CT IMAGING | Facility: CLINIC | Age: 45
End: 2020-04-15
Attending: PHYSICIAN ASSISTANT
Payer: COMMERCIAL

## 2020-04-15 ENCOUNTER — PREP FOR PROCEDURE (OUTPATIENT)
Dept: SURGERY | Facility: CLINIC | Age: 45
End: 2020-04-15

## 2020-04-15 ENCOUNTER — HOSPITAL ENCOUNTER (EMERGENCY)
Facility: CLINIC | Age: 45
Discharge: HOME | End: 2020-04-15
Attending: PHYSICIAN ASSISTANT | Admitting: PHYSICIAN ASSISTANT
Payer: COMMERCIAL

## 2020-04-15 VITALS
RESPIRATION RATE: 16 BRPM | HEART RATE: 80 BPM | HEIGHT: 71 IN | TEMPERATURE: 98.8 F | OXYGEN SATURATION: 98 % | DIASTOLIC BLOOD PRESSURE: 80 MMHG | BODY MASS INDEX: 38.38 KG/M2 | SYSTOLIC BLOOD PRESSURE: 140 MMHG

## 2020-04-15 DIAGNOSIS — K42.9 PERIUMBILICAL HERNIA: ICD-10-CM

## 2020-04-15 DIAGNOSIS — K42.9 UMBILICAL HERNIA: Primary | ICD-10-CM

## 2020-04-15 LAB
ALBUMIN UR-MCNC: 10 MG/DL
ANION GAP SERPL CALCULATED.3IONS-SCNC: 5 MMOL/L (ref 3–14)
APPEARANCE UR: CLEAR
BASOPHILS # BLD AUTO: 0.1 10E9/L (ref 0–0.2)
BASOPHILS NFR BLD AUTO: 0.8 %
BILIRUB UR QL STRIP: NEGATIVE
BUN SERPL-MCNC: 12 MG/DL (ref 7–30)
CALCIUM SERPL-MCNC: 9 MG/DL (ref 8.5–10.1)
CHLORIDE SERPL-SCNC: 109 MMOL/L (ref 94–109)
CO2 SERPL-SCNC: 25 MMOL/L (ref 20–32)
COLOR UR AUTO: YELLOW
CREAT SERPL-MCNC: 1.08 MG/DL (ref 0.66–1.25)
DIFFERENTIAL METHOD BLD: NORMAL
EOSINOPHIL # BLD AUTO: 0.2 10E9/L (ref 0–0.7)
EOSINOPHIL NFR BLD AUTO: 3.1 %
ERYTHROCYTE [DISTWIDTH] IN BLOOD BY AUTOMATED COUNT: 13.8 % (ref 10–15)
GFR SERPL CREATININE-BSD FRML MDRD: 84 ML/MIN/{1.73_M2}
GLUCOSE SERPL-MCNC: 109 MG/DL (ref 70–99)
GLUCOSE UR STRIP-MCNC: NEGATIVE MG/DL
HCT VFR BLD AUTO: 47 % (ref 40–53)
HGB BLD-MCNC: 16 G/DL (ref 13.3–17.7)
HGB UR QL STRIP: NEGATIVE
IMM GRANULOCYTES # BLD: 0 10E9/L (ref 0–0.4)
IMM GRANULOCYTES NFR BLD: 0.5 %
KETONES UR STRIP-MCNC: NEGATIVE MG/DL
LEUKOCYTE ESTERASE UR QL STRIP: NEGATIVE
LYMPHOCYTES # BLD AUTO: 2.5 10E9/L (ref 0.8–5.3)
LYMPHOCYTES NFR BLD AUTO: 38.8 %
MCH RBC QN AUTO: 29.9 PG (ref 26.5–33)
MCHC RBC AUTO-ENTMCNC: 34 G/DL (ref 31.5–36.5)
MCV RBC AUTO: 88 FL (ref 78–100)
MONOCYTES # BLD AUTO: 0.5 10E9/L (ref 0–1.3)
MONOCYTES NFR BLD AUTO: 8.3 %
MUCOUS THREADS #/AREA URNS LPF: PRESENT /LPF
NEUTROPHILS # BLD AUTO: 3.1 10E9/L (ref 1.6–8.3)
NEUTROPHILS NFR BLD AUTO: 48.5 %
NITRATE UR QL: NEGATIVE
NRBC # BLD AUTO: 0 10*3/UL
NRBC BLD AUTO-RTO: 0 /100
PH UR STRIP: 5 PH (ref 5–7)
PLATELET # BLD AUTO: 252 10E9/L (ref 150–450)
POTASSIUM SERPL-SCNC: 3.9 MMOL/L (ref 3.4–5.3)
RBC # BLD AUTO: 5.36 10E12/L (ref 4.4–5.9)
RBC #/AREA URNS AUTO: 0 /HPF (ref 0–2)
SODIUM SERPL-SCNC: 139 MMOL/L (ref 133–144)
SOURCE: ABNORMAL
SP GR UR STRIP: 1.03 (ref 1–1.03)
UROBILINOGEN UR STRIP-MCNC: NORMAL MG/DL (ref 0–2)
WBC # BLD AUTO: 6.4 10E9/L (ref 4–11)
WBC #/AREA URNS AUTO: 1 /HPF (ref 0–5)

## 2020-04-15 PROCEDURE — 25800030 ZZH RX IP 258 OP 636: Performed by: PHYSICIAN ASSISTANT

## 2020-04-15 PROCEDURE — 81001 URINALYSIS AUTO W/SCOPE: CPT | Performed by: PHYSICIAN ASSISTANT

## 2020-04-15 PROCEDURE — 85025 COMPLETE CBC W/AUTO DIFF WBC: CPT | Performed by: PHYSICIAN ASSISTANT

## 2020-04-15 PROCEDURE — 25000128 H RX IP 250 OP 636: Performed by: PHYSICIAN ASSISTANT

## 2020-04-15 PROCEDURE — 96361 HYDRATE IV INFUSION ADD-ON: CPT

## 2020-04-15 PROCEDURE — 99204 OFFICE O/P NEW MOD 45 MIN: CPT | Performed by: SURGERY

## 2020-04-15 PROCEDURE — 96360 HYDRATION IV INFUSION INIT: CPT | Mod: 59

## 2020-04-15 PROCEDURE — 74177 CT ABD & PELVIS W/CONTRAST: CPT

## 2020-04-15 PROCEDURE — 99285 EMERGENCY DEPT VISIT HI MDM: CPT | Mod: 25

## 2020-04-15 PROCEDURE — 25000125 ZZHC RX 250: Performed by: PHYSICIAN ASSISTANT

## 2020-04-15 PROCEDURE — 80048 BASIC METABOLIC PNL TOTAL CA: CPT | Performed by: PHYSICIAN ASSISTANT

## 2020-04-15 RX ORDER — IOPAMIDOL 755 MG/ML
135 INJECTION, SOLUTION INTRAVASCULAR ONCE
Status: COMPLETED | OUTPATIENT
Start: 2020-04-15 | End: 2020-04-15

## 2020-04-15 RX ADMIN — SODIUM CHLORIDE 1000 ML: 9 INJECTION, SOLUTION INTRAVENOUS at 12:59

## 2020-04-15 RX ADMIN — SODIUM CHLORIDE 79 ML: 9 INJECTION, SOLUTION INTRAVENOUS at 13:03

## 2020-04-15 RX ADMIN — IOPAMIDOL 135 ML: 755 INJECTION, SOLUTION INTRAVENOUS at 13:03

## 2020-04-15 ASSESSMENT — ENCOUNTER SYMPTOMS
NAUSEA: 1
DYSURIA: 0
HEMATURIA: 0
ABDOMINAL PAIN: 1
VOMITING: 0
FREQUENCY: 0
FEVER: 1
DIFFICULTY URINATING: 0
CONSTIPATION: 1

## 2020-04-15 NOTE — DISCHARGE INSTRUCTIONS
*Get plenty of rest and avoid strenuous activities.  *Return to the ER if you develop fever, worsening pain, faint or feel like you will faint or become worse in any way.

## 2020-04-15 NOTE — ED AVS SNAPSHOT
Emergency Department  [address] [place] [id number]  Phone:  [phone]  Fax:  [phone]                                    Milan Carrillo   MRN: [id number]    Department:   Emergency Department   Date of Visit:  4/15/2020           After Visit Summary Signature Page    I have received my discharge instructions, and my questions have been answered. I have discussed any challenges I see with this plan with the nurse or doctor.    ..........................................................................................................................................  Patient/Patient Representative Signature      ..........................................................................................................................................  Patient Representative Print Name and Relationship to Patient    ..................................................               ................................................  Date                                   Time    ..........................................................................................................................................  Reviewed by Signature/Title    ...................................................              ..............................................  Date                                               Time          22EPIC Rev 08/18

## 2020-04-15 NOTE — ED PROVIDER NOTES
"  History     Chief Complaint:  Abdominal Pain     HPI   Milan Carrillo is a 45 year old male who presents for evaluation of periumbilical and left lower quadrant abdominal pain. Patient reports he has a known periumbilical hernia and notes increasing pain over the last 3-4 days.  He is to be able to reduce the hernia, though recently has been unable to. He reports low grade fevers of . He reports recent constipation, but had a bowel movement yesterday. Notes nausea, no vomiting. Patient denies urinary symptoms. He has no history of similar pain.     Allergies:  No Known Drug Allergies    Medications:   Phentermine    Past Medical History:    Kidney stone  Sleep apnea  Hyperlipidemia   Obesity  Decreased libido   Periumbilical hernia    Past Surgical History:    ENT surgery  Eye surgery  Right shoulder surgery  Recession resection (repair strabismus)     Family History:    No past pertinent family history.     Social History:  The patient presents unaccompanied to the ED.  PCP: Iesha Loera Md  Smoking status: Never Smoker  Smokeless tobacco: Never Used  Alcohol use: Negative   Drug use: Negative    Review of Systems   Constitutional: Positive for fever.   Gastrointestinal: Positive for abdominal pain, constipation and nausea. Negative for vomiting.   Genitourinary: Negative for difficulty urinating, dysuria, frequency, hematuria and urgency.   All other systems reviewed and are negative.      Physical Exam     Patient Vitals for the past 24 hrs:   BP Temp Temp src Pulse Resp SpO2 Height   04/15/20 1206 (!) 144/84 98.8  F (37.1  C) Oral 87 18 98 % 1.803 m (5' 11\")       Physical Exam  General: Alert, interactive.  Head:  Scalp is atraumatic.  Eyes:  EOM intact. The pupils are equal, round, and reactive to light. No scleral icterus.   ENT:                                      Ears:  The external ears are normal.   Nose:  The external nose is normal.  Throat:  The oropharynx is normal. Mucus membranes are " moist.                 Neck:  Normal range of motion. There is no rigidity.   CV:  Regular rate and rhythm. No murmur. 2+ radial pulses  Resp:  Breath sounds are clear bilaterally. Non-labored, no retractions or accessory muscle use.  GI:  Abdomen is soft, no distension, periumbilical hernia- unable to be easily reduced. LLQ tenderness. No rebound or guarding.   MS:  Normal range of motion.   Skin:  Warm and dry.   Neuro:  Strength and sensation grossly intact.   Psych:  Awake. Alert.  Appropriate interactions.       Emergency Department Course     Imaging:  Radiographic findings were communicated with the patient who voiced understanding of the findings.    CT Abdomen Pelvis w Contrast  IMPRESSION:   1.  A moderate-sized fat-containing paraumbilical hernia contains hazy  increased density, suggesting associated infiltration and/or fat  necrosis. No evidence for associated bowel obstruction or abscess.  2.  No other cause for abdominal pain is identified. Reading per radiology.     Laboratory:  Laboratory findings were communicated with the patient who voiced understanding of the findings.    CBC: WBC: 6.4, HGB 16.0, PLT: 252  BMP: Glucose 109 (H), o/w WNL (Creatinine: 1.08)    UA: Protein Albumin 10, Mucous Present, o/w WNL     Interventions:  1259 NS 1000 mL IV Bolus     Emergency Department Course:  Past medical records, nursing notes, and vitals reviewed.   1230 I performed an exam of the patient and obtained history, as documented above.    The patient was sent for a CT Abdomen Pelvis while in the emergency department, results above.      IV was inserted and blood was drawn for laboratory testing, results above.    The patient provided a urine sample here in the emergency department. This was sent for laboratory testing, findings above.     IV fluids administered.     1400 I rechecked and updated the patient.     1403 I spoke with Dr. Ramachandran, General Surgery, in the emergency department regarding the patient. He  evaluated the patient at bedside. Dr. Ramachandran was able to reduce the hernia and the patient's pain has improved.     1410 I rechecked the patient. Explained findings to the Patient.    Findings and plan explained to the Patient. Patient discharged home with instructions regarding supportive care, medications, and reasons to return. The importance of close follow-up was reviewed.    I personally reviewed the laboratory and imaging results with the Patient and answered all related questions prior to discharge.     Impression & Plan      Medical Decision Making:  Milan Carrillo is a 45 year old male who presents with periumbilical pain. He has a known periumbilical hernia, and over the last 3 days notes increasing pain and has been unable to reduce the hernia.  CT scan reveals periumbilical hernia with infiltration/fat necrosis.  Lab work including CBC and BMP are overall unremarkable. Dr. Ramachandran of General Surgery evaluated the patient at bedside and was able to reduce the hernia. Patient felt more comfortable after this was reduced. Plan to follow up with Dr. Ramachandran's office for outpatient surgery. Patient agrees with this plan. CT without evidence of other acute findings, such as diverticulitis, appendicitis, bowel obstruction, among others. All questions and concerns addressed prior to discharge home.  Return precautions discussed including fevers, increasing pain, repetitive vomiting, or any other new/concerning symptoms.    Diagnosis:    ICD-10-CM    1. Periumbilical hernia  K42.9         Disposition:  Discharged to home.      Scribe Disclosure:  I, Keisha Shen, am serving as a scribe at 12:08 PM on 4/15/2020 to document services personally performed by Fariha Vergara PA-C based on my observations and the provider's statements to me.     4/15/2020   Fariha Gibson PA-C  04/15/20 2399

## 2020-04-15 NOTE — CONSULTS
"Surgery Consultation, [facility]         Curt Ramachandran MD, MD    Milan Carrillo MRN# [id number]   YOB: 1975 Age: 45 year old     PCP:  Iesha Loera Md [phone]    Chief Complaint:  Umbilical hernia    History of Present Illness:  Milan Carrillo is a 45 year old male who presented with a bulge near the umbilicus. The bulge comes and goes but has gotten larger over time. It is uncomfortable when the patient is engaging in exertional activity.  Bulge is always gone away with decreased activity.  Over the last several days, patient has noticed increased fullness and discomfort at the site.  Has not been able to completely push it in.  No overlying redness.  Patient does admit to being constipated with some radiating pain to the left flank.  He presented to the emergency department with the above concerns.  CT scan of the abdomen was obtained which showed a moderately sized fat-containing umbilical hernia with inflammation of the fat.  No evidence for bowel within the hernia.  We are asked to evaluate the patient.    PMH:  iMlan Carrillo  has a past medical history of Kidney stone and Sleep apnea.  PSH:  Milan Carrillo  has a past surgical history that includes Eye surgery; orthopedic surgery; ENT surgery; Recession resection (repair strabismus) (10/1/2012); and strabismus surgery.    Home medications and allergies reviewed.    Social History:  Milan Carrillo  reports that he has never smoked. He has never used smokeless tobacco. He reports that he does not drink alcohol or use drugs.  Family History:  Milan Carrillo family history is not on file.    ROS:  The 10 point Review of Systems is negative other than noted in the HPI.    Physical Exam:  Blood pressure (!) 140/80, pulse 80, temperature 98.8  F (37.1  C), temperature source Oral, resp. rate 16, height 1.803 m (5' 11\"), SpO2 98 %.  0 lbs 0 oz  Healthy young gentleman in no distress.  Pupils equal round and reactive to light.   No " cervical lymphadenopathy or thyromegaly.   Lung fields clear, breathing comfortably.   Heart normal sinus rhythm.  No murmurs rubs or gallops.  Abdomen soft, nontender, nondistended.  2 to 3 cm supraumbilical hernia.  I was able to reduce this completely.  Soft, nontender.  Fascial defect approximately 2 cm.    CT scan personally reviewed.  Reveals a 2 to 3 cm fascial defect containing fat with some mild inflammatory changes.  No evidence for bowel within the hernia.       Assessment/plan:  Pleasant healthy young male with what appears to be an umbilical hernia. I explained that these are usually not a cause for small bowel incarceration or obstruction, but do become larger over time. They can certainly be uncomfortable and repair is warranted. I recommended an open umbilical hernia repair with mesh. This would normally be done with IV sedation and local. Risks of surgery were explained to the patient, including hernia recurrence, wound infection, or mesh removal.  As the hernia was able to be reduced, I do not think that this represents a surgical emergency.  I explained to the patient what would constitute symptoms requiring return trip to the emergency department.  Given the COVID-19 situation, we are not currently performing elective surgeries.  My office will notify the patient when we can get him scheduled for open umbilical hernia repair.      Rocky Ramachandran M.D.  [facility]  [phone]    Please route or send letter to:  Primary Care Provider (PCP) and Referring Provider

## 2020-05-06 ENCOUNTER — TELEPHONE (OUTPATIENT)
Dept: SURGERY | Facility: CLINIC | Age: 45
End: 2020-05-06

## 2020-05-06 DIAGNOSIS — Z11.59 ENCOUNTER FOR SCREENING FOR OTHER VIRAL DISEASES: Primary | ICD-10-CM

## 2020-05-06 PROBLEM — K42.9 UMBILICAL HERNIA: Status: ACTIVE | Noted: 2020-05-06

## 2020-05-06 NOTE — TELEPHONE ENCOUNTER
Type of surgery: Open umbilical hernia repair  Location of surgery: [facility]  Date and time of surgery: 5/20/20 at 8am  Surgeon: Dr. Curt Ramachandran  Pre-Op Appt Date: Patient to schedule  Post-Op Appt Date: Patient to schedule   Packet sent out: Yes  Pre-cert/Authorization completed:  Not Applicable  Date: 5/6/20

## 2020-05-13 ENCOUNTER — RESULTS ONLY (OUTPATIENT)
Dept: LAB | Age: 45
End: 2020-05-13

## 2020-05-13 ENCOUNTER — APPOINTMENT (OUTPATIENT)
Dept: URGENT CARE | Facility: URGENT CARE | Age: 45
End: 2020-05-13
Payer: COMMERCIAL

## 2020-05-14 LAB
SARS-COV-2 RNA SPEC QL NAA+PROBE: NOT DETECTED
SPECIMEN SOURCE: NORMAL

## 2020-05-18 ENCOUNTER — VIRTUAL VISIT (OUTPATIENT)
Dept: FAMILY MEDICINE | Facility: CLINIC | Age: 45
End: 2020-05-18
Payer: COMMERCIAL

## 2020-05-18 ENCOUNTER — TELEPHONE (OUTPATIENT)
Dept: FAMILY MEDICINE | Facility: CLINIC | Age: 45
End: 2020-05-18

## 2020-05-18 DIAGNOSIS — R06.02 SOB (SHORTNESS OF BREATH): Primary | ICD-10-CM

## 2020-05-18 NOTE — PROGRESS NOTES
"Family Medicine Telephone Visit Note           Telephone Visit Consent   Patient was verbally read the following and verbal consent was obtained.    \"Telephone visits are billed at different rates depending on your insurance coverage. During this emergency period, for some insurers they may be billed the same as an in-person visit.  Please reach out to your insurance provider with any questions.  If during the course of the call the physician/provider feels a telephone visit is not appropriate, you will not be charged for this service.\"    Name person giving consent:  Patient   Date verbal consent given:  5/18/2020  Time verbal consent given:  3:59 PM       Chief Complaint   Patient presents with     Follow Up     Patient states last week around the night of 5/11/20 he developed a fever, SOB and sore throat. 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 He was advised to follow up with PCP             HPI   Patients name: Milan  Appointment start time:  4:11 PM    Started getting shortness of breath on 5/11 after our pre-op. Fever .9. 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 Having mild sore throat at times, but not bad. No other symptoms. Gets winded coming down the stairs and going up >6 stairs. Shortness of breath lasts a lot longer than his normal \"winded\" from exertion. Not feeling anxious. Was able to get out and set up a sprinkler system at his house without significant shortness of breath.       Current Outpatient Medications   Medication Sig Dispense Refill     phentermine (ADIPEX-P) 37.5 MG tablet Take 37.5 mg by mouth every morning (before breakfast)       oxyCODONE (ROXICODONE) 5 MG tablet Take 5 mg by mouth every 6 hours as needed for severe pain       No Known Allergies           Review of Systems:     Constitutional, HEENT, cardiovascular, pulmonary, gi and gu systems are negative, except as otherwise " "noted.         Physical Exam:     There were no vitals taken for this visit.  Estimated body mass index is 38.38 kg/m  as calculated from the following:    Height as of 4/15/20: 1.803 m (5' 11\").    Weight as of 6/17/19: 124.8 kg (275 lb 3 oz).    Exam:  Constitutional: healthy, alert and no distress  Psychiatric: mentation appears normal and affect normal/bright  Respiratory: No acute respiratory distress; speaking in full sentences without issue        Assessment and Plan   Milan was seen today for follow up.    Diagnoses and all orders for this visit:    SOB (shortness of breath)  1 week of shortness of breath. Did have negative COVID testing while symptomatic, but will continue to treat as if COVID positive to prevent transmission to community. CDC recommendations for isolation provided and return precautions as well including severe shortness of breath or inability to tolerate po. Work letter sent via [employer]. Other etiologies include pneumonia, pe, obesity hypoventilation. Given relatively short duration, will continue with COVID presumption, but if no improvement in the next week, would consider workup for PE/Pna or addition of albuterol inhaler for symptom relief. Offered GetWell Loop, but patient declined at this time.        Refilled medications that would be required in the next 3 months.     After Visit Information:  Patient declined AVS     No follow-ups on file.    Appointment end time: 4:35 PM  This is a telephone visit that took 14 minutes.      Clinician location:  [facility] [address]    Milan Byers MD  I precepted today with Dr. Alejandro.    "

## 2020-05-18 NOTE — LETTER
May 18, 2020      To Whom It May Concern:      Milan Carrillo was seen in our clinic today, 05/18/20. Due to COVID-19 like symptoms he should be excused from work for 10 days from symptom onset, plus 72 hours without fever with no fever-reducing medications, and with improving respiratory symptoms, based on the current CDC guidelines. He may return to work when these conditions are met.    Sincerely,    Milan Byers MD

## 2020-05-18 NOTE — TELEPHONE ENCOUNTER
"Called patient to follow up per provider's request for complains of \"fever and cough\" following a negative test  for COVID 19, unable to get hold, left message to call back the clinic-if patient, returns phone call, please transfer to RN.    Lenin Angeles RN    "

## 2020-05-18 NOTE — PROGRESS NOTES
Preceptor Attestation:    I talked to the patient on the phone and discussed the patient with the resident. I have verified the content of the note, which accurately reflects my assessment of the patient and the plan of care.   Supervising Physician:  Ed Alejandro MD.

## 2020-05-18 NOTE — TELEPHONE ENCOUNTER
Received transfer call and spoke with patient- Pt reported, intermittent SOB, low grade, sore throat, getting easily tired. Pt denies cough, chest pain, difficulty taking deep breath. Pt last virtual consult on 05/11/2020, tested negative for COVID 19. Same telephone returns consult, scheduled on 05/18/2020 at 1600.    Advised patient to call back the clinic or seek urgent medical care, if having difficulty breathing, uncontrolled fever, chest pain or unable to wait till scheduled provider, telephone consult. Pt verbalized understanding and agreed.    Message routed to Provider as [place].    Lenin Angeles RN

## 2020-05-18 NOTE — TELEPHONE ENCOUNTER
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

## 2020-05-19 ENCOUNTER — TELEPHONE (OUTPATIENT)
Dept: SURGERY | Facility: CLINIC | Age: 45
End: 2020-05-19

## 2020-05-19 RX ORDER — OXYCODONE HYDROCHLORIDE 5 MG/1
5 TABLET ORAL EVERY 6 HOURS PRN
Status: ON HOLD | COMMUNITY
End: 2020-05-29

## 2020-05-19 NOTE — TELEPHONE ENCOUNTER
Called patient to inform him that tomorrow's surgery will be cancelled d/t his present symptoms.  Per Dr. Ramachandran, patient can be added onto surgery schedule for next week.    Patient in agreement.    Natalie Robert RN-BSN

## 2020-05-19 NOTE — TELEPHONE ENCOUNTER
Procedure:  OPEN umbilical hernia repair with mesh    Date:  05/19/2020    Surgeon: Duarte    Patient is a health care worker, with known explore to covid positive patients.    He began experiencing symptom of fever, SOB last week and was advised to get tested.    Test came back negative.    He reports that he is still running a low grade fever 99.8 and is still experiencing shortness of breath, although it is mild.    Pt. Wondering if surgery should be rescheduled until symptoms resolve or if okay to proceed since test came back negative.    Page sent to Dr. Ramachandran to discuss.    Natalie Robert, RN-BSN

## 2020-05-19 NOTE — TELEPHONE ENCOUNTER
Name of caller: Patient    Reason for Call:  Scheduled for open umbilical hernia repair tomorrow at 7:30am.  Told pre-op nurse this morning he does have a low grade fever of 99.8% and not sure if he should proceed or postpone.  5/13 COVID-19 test was negative    Surgeon:  Dr. Rmaachandran     Recent Surgery:  No    If yes, when & what type:  N/A      Best phone number to reach pt at is: [phone]  Ok to leave a message with medical info? Yes.    Pharmacy preferred (if calling for a refill): N/A

## 2020-05-26 DIAGNOSIS — Z11.59 ENCOUNTER FOR SCREENING FOR OTHER VIRAL DISEASES: ICD-10-CM

## 2020-05-26 PROCEDURE — U0003 INFECTIOUS AGENT DETECTION BY NUCLEIC ACID (DNA OR RNA); SEVERE ACUTE RESPIRATORY SYNDROME CORONAVIRUS 2 (SARS-COV-2) (CORONAVIRUS DISEASE [COVID-19]), AMPLIFIED PROBE TECHNIQUE, MAKING USE OF HIGH THROUGHPUT TECHNOLOGIES AS DESCRIBED BY CMS-2020-01-R: HCPCS | Mod: 90 | Performed by: SURGERY

## 2020-05-26 PROCEDURE — 99207 ZZC NO BILLABLE SERVICE THIS VISIT: CPT

## 2020-05-26 PROCEDURE — 99000 SPECIMEN HANDLING OFFICE-LAB: CPT | Performed by: SURGERY

## 2020-05-27 LAB
SARS-COV-2 RNA SPEC QL NAA+PROBE: NOT DETECTED
SPECIMEN SOURCE: NORMAL

## 2020-05-28 ENCOUNTER — ANESTHESIA EVENT (OUTPATIENT)
Dept: SURGERY | Facility: CLINIC | Age: 45
End: 2020-05-28
Payer: COMMERCIAL

## 2020-05-29 ENCOUNTER — HOSPITAL ENCOUNTER (OUTPATIENT)
Facility: CLINIC | Age: 45
Discharge: HOME | End: 2020-05-29
Attending: SURGERY | Admitting: SURGERY
Payer: COMMERCIAL

## 2020-05-29 ENCOUNTER — SURGERY (OUTPATIENT)
Age: 45
End: 2020-05-29
Payer: COMMERCIAL

## 2020-05-29 ENCOUNTER — ANESTHESIA (OUTPATIENT)
Dept: SURGERY | Facility: CLINIC | Age: 45
End: 2020-05-29
Payer: COMMERCIAL

## 2020-05-29 ENCOUNTER — APPOINTMENT (OUTPATIENT)
Dept: SURGERY | Facility: PHYSICIAN GROUP | Age: 45
End: 2020-05-29
Payer: COMMERCIAL

## 2020-05-29 VITALS
OXYGEN SATURATION: 94 % | HEIGHT: 71 IN | WEIGHT: 272 LBS | SYSTOLIC BLOOD PRESSURE: 116 MMHG | TEMPERATURE: 96.8 F | RESPIRATION RATE: 16 BRPM | DIASTOLIC BLOOD PRESSURE: 68 MMHG | HEART RATE: 59 BPM | BODY MASS INDEX: 38.08 KG/M2

## 2020-05-29 DIAGNOSIS — K42.9 UMBILICAL HERNIA WITHOUT OBSTRUCTION OR GANGRENE: Primary | ICD-10-CM

## 2020-05-29 DIAGNOSIS — K42.9 UMBILICAL HERNIA: ICD-10-CM

## 2020-05-29 DIAGNOSIS — G89.18 POST-OP PAIN: ICD-10-CM

## 2020-05-29 PROCEDURE — 27210794 ZZH OR GENERAL SUPPLY STERILE: Performed by: SURGERY

## 2020-05-29 PROCEDURE — 25000128 H RX IP 250 OP 636: Performed by: SURGERY

## 2020-05-29 PROCEDURE — 71000012 ZZH RECOVERY PHASE 1 LEVEL 1 FIRST HR: Performed by: SURGERY

## 2020-05-29 PROCEDURE — 36000052 ZZH SURGERY LEVEL 2 EA 15 ADDTL MIN: Performed by: SURGERY

## 2020-05-29 PROCEDURE — C1781 MESH (IMPLANTABLE): HCPCS | Performed by: SURGERY

## 2020-05-29 PROCEDURE — 49585 ZZHC REPAIR UMBILICAL HERN,5+Y/O,REDUC: CPT | Performed by: SURGERY

## 2020-05-29 PROCEDURE — 25000125 ZZHC RX 250: Performed by: SURGERY

## 2020-05-29 PROCEDURE — 71000013 ZZH RECOVERY PHASE 1 LEVEL 1 EA ADDTL HR: Performed by: SURGERY

## 2020-05-29 PROCEDURE — 25800030 ZZH RX IP 258 OP 636: Performed by: NURSE ANESTHETIST, CERTIFIED REGISTERED

## 2020-05-29 PROCEDURE — 25000125 ZZHC RX 250: Performed by: NURSE ANESTHETIST, CERTIFIED REGISTERED

## 2020-05-29 PROCEDURE — 25000128 H RX IP 250 OP 636: Performed by: ANESTHESIOLOGY

## 2020-05-29 PROCEDURE — 37000009 ZZH ANESTHESIA TECHNICAL FEE, EACH ADDTL 15 MIN: Performed by: SURGERY

## 2020-05-29 PROCEDURE — 36000050 ZZH SURGERY LEVEL 2 1ST 30 MIN: Performed by: SURGERY

## 2020-05-29 PROCEDURE — 25000132 ZZH RX MED GY IP 250 OP 250 PS 637: Performed by: PHYSICIAN ASSISTANT

## 2020-05-29 PROCEDURE — 25000128 H RX IP 250 OP 636: Performed by: NURSE ANESTHETIST, CERTIFIED REGISTERED

## 2020-05-29 PROCEDURE — 49585 ZZHC REPAIR UMBILICAL HERN,5+Y/O,REDUC: CPT | Mod: AS | Performed by: PHYSICIAN ASSISTANT

## 2020-05-29 PROCEDURE — 71000027 ZZH RECOVERY PHASE 2 EACH 15 MINS: Performed by: SURGERY

## 2020-05-29 PROCEDURE — 37000008 ZZH ANESTHESIA TECHNICAL FEE, 1ST 30 MIN: Performed by: SURGERY

## 2020-05-29 PROCEDURE — 40000170 ZZH STATISTIC PRE-PROCEDURE ASSESSMENT II: Performed by: SURGERY

## 2020-05-29 DEVICE — VENTRALEX ST HERNIA PATCH, 6.4 CM (2.5"), CIRCLE
Type: IMPLANTABLE DEVICE | Site: ABDOMEN | Status: FUNCTIONAL
Brand: VENTRALEX

## 2020-05-29 RX ORDER — FENTANYL CITRATE 50 UG/ML
25-50 INJECTION, SOLUTION INTRAMUSCULAR; INTRAVENOUS
Status: DISCONTINUED | OUTPATIENT
Start: 2020-05-29 | End: 2020-05-29 | Stop reason: HOSPADM

## 2020-05-29 RX ORDER — OXYCODONE AND ACETAMINOPHEN 5; 325 MG/1; MG/1
1 TABLET ORAL EVERY 6 HOURS PRN
Qty: 16 TABLET | Refills: 0 | Status: SHIPPED | OUTPATIENT
Start: 2020-05-29 | End: 2020-06-01

## 2020-05-29 RX ORDER — ONDANSETRON 2 MG/ML
4 INJECTION INTRAMUSCULAR; INTRAVENOUS EVERY 30 MIN PRN
Status: DISCONTINUED | OUTPATIENT
Start: 2020-05-29 | End: 2020-05-29 | Stop reason: HOSPADM

## 2020-05-29 RX ORDER — HYDROXYZINE HYDROCHLORIDE 50 MG/ML
50 INJECTION, SOLUTION INTRAMUSCULAR ONCE
Status: COMPLETED | OUTPATIENT
Start: 2020-05-29 | End: 2020-05-29

## 2020-05-29 RX ORDER — OXYCODONE HYDROCHLORIDE 5 MG/1
5 TABLET ORAL EVERY 4 HOURS PRN
Status: DISCONTINUED | OUTPATIENT
Start: 2020-05-29 | End: 2020-05-30 | Stop reason: HOSPADM

## 2020-05-29 RX ORDER — ONDANSETRON 2 MG/ML
INJECTION INTRAMUSCULAR; INTRAVENOUS PRN
Status: DISCONTINUED | OUTPATIENT
Start: 2020-05-29 | End: 2020-05-29

## 2020-05-29 RX ORDER — DEXAMETHASONE SODIUM PHOSPHATE 4 MG/ML
INJECTION, SOLUTION INTRA-ARTICULAR; INTRALESIONAL; INTRAMUSCULAR; INTRAVENOUS; SOFT TISSUE PRN
Status: DISCONTINUED | OUTPATIENT
Start: 2020-05-29 | End: 2020-05-29

## 2020-05-29 RX ORDER — SODIUM CHLORIDE, SODIUM LACTATE, POTASSIUM CHLORIDE, CALCIUM CHLORIDE 600; 310; 30; 20 MG/100ML; MG/100ML; MG/100ML; MG/100ML
INJECTION, SOLUTION INTRAVENOUS CONTINUOUS
Status: DISCONTINUED | OUTPATIENT
Start: 2020-05-29 | End: 2020-05-29 | Stop reason: HOSPADM

## 2020-05-29 RX ORDER — NALOXONE HYDROCHLORIDE 0.4 MG/ML
.1-.4 INJECTION, SOLUTION INTRAMUSCULAR; INTRAVENOUS; SUBCUTANEOUS
Status: DISCONTINUED | OUTPATIENT
Start: 2020-05-29 | End: 2020-05-29 | Stop reason: HOSPADM

## 2020-05-29 RX ORDER — CEFAZOLIN SODIUM IN 0.9 % NACL 3 G/100 ML
3 INTRAVENOUS SOLUTION, PIGGYBACK (ML) INTRAVENOUS
Status: COMPLETED | OUTPATIENT
Start: 2020-05-29 | End: 2020-05-29

## 2020-05-29 RX ORDER — MEPERIDINE HYDROCHLORIDE 25 MG/ML
12.5 INJECTION INTRAMUSCULAR; INTRAVENOUS; SUBCUTANEOUS
Status: DISCONTINUED | OUTPATIENT
Start: 2020-05-29 | End: 2020-05-29 | Stop reason: HOSPADM

## 2020-05-29 RX ORDER — HYDROCODONE BITARTRATE AND ACETAMINOPHEN 5; 325 MG/1; MG/1
1-2 TABLET ORAL EVERY 4 HOURS PRN
Qty: 18 TABLET | Refills: 0 | Status: SHIPPED | OUTPATIENT
Start: 2020-05-29 | End: 2020-05-29

## 2020-05-29 RX ORDER — KETOROLAC TROMETHAMINE 30 MG/ML
INJECTION, SOLUTION INTRAMUSCULAR; INTRAVENOUS PRN
Status: DISCONTINUED | OUTPATIENT
Start: 2020-05-29 | End: 2020-05-29

## 2020-05-29 RX ORDER — PROPOFOL 10 MG/ML
INJECTION, EMULSION INTRAVENOUS CONTINUOUS PRN
Status: DISCONTINUED | OUTPATIENT
Start: 2020-05-29 | End: 2020-05-29

## 2020-05-29 RX ORDER — PROPOFOL 10 MG/ML
INJECTION, EMULSION INTRAVENOUS PRN
Status: DISCONTINUED | OUTPATIENT
Start: 2020-05-29 | End: 2020-05-29

## 2020-05-29 RX ORDER — ONDANSETRON 4 MG/1
4 TABLET, ORALLY DISINTEGRATING ORAL EVERY 30 MIN PRN
Status: DISCONTINUED | OUTPATIENT
Start: 2020-05-29 | End: 2020-05-29 | Stop reason: HOSPADM

## 2020-05-29 RX ORDER — SODIUM CHLORIDE, SODIUM LACTATE, POTASSIUM CHLORIDE, CALCIUM CHLORIDE 600; 310; 30; 20 MG/100ML; MG/100ML; MG/100ML; MG/100ML
INJECTION, SOLUTION INTRAVENOUS CONTINUOUS PRN
Status: DISCONTINUED | OUTPATIENT
Start: 2020-05-29 | End: 2020-05-29

## 2020-05-29 RX ORDER — FENTANYL CITRATE 50 UG/ML
INJECTION, SOLUTION INTRAMUSCULAR; INTRAVENOUS PRN
Status: DISCONTINUED | OUTPATIENT
Start: 2020-05-29 | End: 2020-05-29

## 2020-05-29 RX ORDER — AMOXICILLIN 250 MG
1 CAPSULE ORAL 2 TIMES DAILY
Qty: 15 TABLET | Refills: 0 | Status: SHIPPED | OUTPATIENT
Start: 2020-05-29 | End: 2020-06-05

## 2020-05-29 RX ORDER — NALOXONE HYDROCHLORIDE 0.4 MG/ML
.1-.4 INJECTION, SOLUTION INTRAMUSCULAR; INTRAVENOUS; SUBCUTANEOUS
Status: DISCONTINUED | OUTPATIENT
Start: 2020-05-29 | End: 2020-05-30 | Stop reason: HOSPADM

## 2020-05-29 RX ORDER — CEFAZOLIN SODIUM 1 G/3ML
1 INJECTION, POWDER, FOR SOLUTION INTRAMUSCULAR; INTRAVENOUS SEE ADMIN INSTRUCTIONS
Status: DISCONTINUED | OUTPATIENT
Start: 2020-05-29 | End: 2020-05-29 | Stop reason: HOSPADM

## 2020-05-29 RX ORDER — OXYCODONE AND ACETAMINOPHEN 5; 325 MG/1; MG/1
1 TABLET ORAL
Status: COMPLETED | OUTPATIENT
Start: 2020-05-29 | End: 2020-05-29

## 2020-05-29 RX ORDER — HYDROCODONE BITARTRATE AND ACETAMINOPHEN 5; 325 MG/1; MG/1
1 TABLET ORAL
Status: CANCELLED | OUTPATIENT
Start: 2020-05-29

## 2020-05-29 RX ORDER — HYDROMORPHONE HYDROCHLORIDE 1 MG/ML
.3-.5 INJECTION, SOLUTION INTRAMUSCULAR; INTRAVENOUS; SUBCUTANEOUS EVERY 10 MIN PRN
Status: DISCONTINUED | OUTPATIENT
Start: 2020-05-29 | End: 2020-05-29 | Stop reason: HOSPADM

## 2020-05-29 RX ADMIN — DEXMEDETOMIDINE HYDROCHLORIDE 8 MCG: 100 INJECTION, SOLUTION INTRAVENOUS at 08:13

## 2020-05-29 RX ADMIN — DEXMEDETOMIDINE HYDROCHLORIDE 4 MCG: 100 INJECTION, SOLUTION INTRAVENOUS at 08:36

## 2020-05-29 RX ADMIN — HYDROMORPHONE HYDROCHLORIDE 0.5 MG: 1 INJECTION, SOLUTION INTRAMUSCULAR; INTRAVENOUS; SUBCUTANEOUS at 09:38

## 2020-05-29 RX ADMIN — FENTANYL CITRATE 25 MCG: 50 INJECTION, SOLUTION INTRAMUSCULAR; INTRAVENOUS at 08:10

## 2020-05-29 RX ADMIN — HYDROXYZINE HYDROCHLORIDE 50 MG: 50 INJECTION, SOLUTION INTRAMUSCULAR at 09:54

## 2020-05-29 RX ADMIN — ONDANSETRON 4 MG: 2 INJECTION INTRAMUSCULAR; INTRAVENOUS at 08:38

## 2020-05-29 RX ADMIN — FENTANYL CITRATE 25 MCG: 50 INJECTION, SOLUTION INTRAMUSCULAR; INTRAVENOUS at 08:13

## 2020-05-29 RX ADMIN — SODIUM CHLORIDE, POTASSIUM CHLORIDE, SODIUM LACTATE AND CALCIUM CHLORIDE: 600; 310; 30; 20 INJECTION, SOLUTION INTRAVENOUS at 07:54

## 2020-05-29 RX ADMIN — KETOROLAC TROMETHAMINE 30 MG: 30 INJECTION, SOLUTION INTRAMUSCULAR at 08:45

## 2020-05-29 RX ADMIN — MIDAZOLAM 2 MG: 1 INJECTION INTRAMUSCULAR; INTRAVENOUS at 07:54

## 2020-05-29 RX ADMIN — DEXMEDETOMIDINE HYDROCHLORIDE 8 MCG: 100 INJECTION, SOLUTION INTRAVENOUS at 08:34

## 2020-05-29 RX ADMIN — DEXAMETHASONE SODIUM PHOSPHATE 4 MG: 4 INJECTION, SOLUTION INTRA-ARTICULAR; INTRALESIONAL; INTRAMUSCULAR; INTRAVENOUS; SOFT TISSUE at 08:04

## 2020-05-29 RX ADMIN — HYDROMORPHONE HYDROCHLORIDE 0.5 MG: 1 INJECTION, SOLUTION INTRAMUSCULAR; INTRAVENOUS; SUBCUTANEOUS at 09:54

## 2020-05-29 RX ADMIN — FENTANYL CITRATE 25 MCG: 50 INJECTION, SOLUTION INTRAMUSCULAR; INTRAVENOUS at 08:37

## 2020-05-29 RX ADMIN — Medication 3 G: at 08:00

## 2020-05-29 RX ADMIN — HYDROMORPHONE HYDROCHLORIDE 0.5 MG: 1 INJECTION, SOLUTION INTRAMUSCULAR; INTRAVENOUS; SUBCUTANEOUS at 08:59

## 2020-05-29 RX ADMIN — HYDROMORPHONE HYDROCHLORIDE 0.5 MG: 1 INJECTION, SOLUTION INTRAMUSCULAR; INTRAVENOUS; SUBCUTANEOUS at 09:19

## 2020-05-29 RX ADMIN — PROPOFOL 150 MCG/KG/MIN: 10 INJECTION, EMULSION INTRAVENOUS at 07:54

## 2020-05-29 RX ADMIN — OXYCODONE HYDROCHLORIDE AND ACETAMINOPHEN 1 TABLET: 5; 325 TABLET ORAL at 10:01

## 2020-05-29 RX ADMIN — BUPIVACAINE HYDROCHLORIDE AND EPINEPHRINE BITARTRATE 30 ML: 5; .005 INJECTION, SOLUTION EPIDURAL; INTRACAUDAL; PERINEURAL at 08:42

## 2020-05-29 RX ADMIN — FENTANYL CITRATE 25 MCG: 50 INJECTION, SOLUTION INTRAMUSCULAR; INTRAVENOUS at 08:01

## 2020-05-29 RX ADMIN — PROPOFOL 30 MG: 10 INJECTION, EMULSION INTRAVENOUS at 07:58

## 2020-05-29 ASSESSMENT — ENCOUNTER SYMPTOMS: SEIZURES: 0

## 2020-05-29 ASSESSMENT — MIFFLIN-ST. JEOR: SCORE: 2150.91

## 2020-05-29 ASSESSMENT — LIFESTYLE VARIABLES: TOBACCO_USE: 0

## 2020-05-29 NOTE — ANESTHESIA PREPROCEDURE EVALUATION
Anesthesia Pre-Procedure Evaluation    Patient: Milan Carrillo   MRN: [id number] : 1975          Preoperative Diagnosis: Umbilical hernia [K42.9]    Procedure(s):  OPEN UMBILICAL HERNIA REPAIR WITH  MESH    Past Medical History:   Diagnosis Date     Hyperlipidemia      Hypertension      Kidney stone      Sleep apnea      Past Surgical History:   Procedure Laterality Date     ENT SURGERY       EYE SURGERY       ORTHOPEDIC SURGERY      R shoulder     RECESSION RESECTION (REPAIR STRABISMUS)  10/1/2012    Procedure: RECESSION RESECTION (REPAIR STRABISMUS);  RIGHT STRABISMUS REPAIR;  Surgeon: Joanie Gurrola MD;  Location: [facility]     STRABISMUS SURGERY         Anesthesia Evaluation     .             ROS/MED HX    ENT/Pulmonary:     (+)sleep apnea, , . .   (-) tobacco use and asthma   Neurologic:      (-) seizures and CVA   Cardiovascular:     (+) Dyslipidemia, hypertension----. : . . . :. .       METS/Exercise Tolerance:  >4 METS   Hematologic:        (-) anemia   Musculoskeletal:         GI/Hepatic:        (-) GERD and liver disease   Renal/Genitourinary:     (+) Nephrolithiasis ,       Endo:     (+) Obesity, .   (-) Type II DM and thyroid disease   Psychiatric:         Infectious Disease:         Malignancy:         Other:                          Physical Exam  Normal systems: cardiovascular, pulmonary and dental    Airway   Mallampati: II  TM distance: >3 FB  Neck ROM: full    Dental     Cardiovascular   Rhythm and rate: regular and normal      Pulmonary    breath sounds clear to auscultation            Lab Results   Component Value Date    WBC 6.4 04/15/2020    HGB 16.0 04/15/2020    HCT 47.0 04/15/2020     04/15/2020    CRP 81.3 (H) 2011     04/15/2020    POTASSIUM 3.9 04/15/2020    CHLORIDE 109 04/15/2020    CO2 25 04/15/2020    BUN 12 04/15/2020    CR 1.08 04/15/2020     (H) 04/15/2020    JUDI 9.0 04/15/2020    MAG 2.0 2011    ALBUMIN 4.1 2014    PROTTOTAL 7.6  "02/24/2016    .1 (H) 02/24/2016    AST 90.3 (H) 02/24/2016    GGT 29.0 04/29/2011    ALKPHOS 85.8 02/24/2016    BILITOTAL 1.1 02/24/2016    LIPASE 48 10/24/2005    AMYLASE 45.0 04/29/2011    TSH 2.52 09/18/2015    T4 1.13 06/04/2010       Preop Vitals  BP Readings from Last 3 Encounters:   05/29/20 (!) 141/94   04/15/20 (!) 140/80   06/17/19 124/82    Pulse Readings from Last 3 Encounters:   05/29/20 84   04/15/20 80   06/17/19 66      Resp Readings from Last 3 Encounters:   05/29/20 20   04/15/20 16   06/17/19 16    SpO2 Readings from Last 3 Encounters:   05/29/20 97%   04/15/20 98%   06/17/19 95%      Temp Readings from Last 1 Encounters:   05/29/20 35.6  C (96  F) (Oral)    Ht Readings from Last 1 Encounters:   05/29/20 1.803 m (5' 11\")      Wt Readings from Last 1 Encounters:   05/29/20 123.4 kg (272 lb)    Estimated body mass index is 37.94 kg/m  as calculated from the following:    Height as of this encounter: 1.803 m (5' 11\").    Weight as of this encounter: 123.4 kg (272 lb).       Anesthesia Plan      History & Physical Review  History and physical reviewed and following examination; no interval change.    ASA Status:  2 .    NPO Status:  > 8 hours    Plan for MAC with Propofol induction. Maintenance will be Inhalation.  Reason for MAC:  Deep or markedly invasive procedure (G8)  PONV prophylaxis:  Ondansetron (or other 5HT-3) and Dexamethasone or Solumedrol         Postoperative Care  Postoperative pain management:  IV analgesics and Oral pain medications.      Consents  Anesthetic plan, risks, benefits and alternatives discussed with:  Patient..                 Amarjit Valera MD  "

## 2020-05-29 NOTE — BRIEF OP NOTE
General Surgery Brief Operative Note    Pre-operative diagnosis: Umbilical hernia [K42.9]   Post-operative diagnosis Same   Procedure: Procedure(s):  OPEN UMBILICAL HERNIA REPAIR WITH  MESH    Surgeon(s), Assistant(s): Surgeon(s) and Role:     * Curt Ramachandran MD - Primary     * Rain Campuzano PA-C - Assisting   Estimated blood loss: 2 mL   Drains: None   Specimens: * No specimens in log *   Findings: See postop diagnosis   Condition: Stable   Comments:      Rain Campuzano PA-C See dictated operative report for full details

## 2020-05-29 NOTE — DISCHARGE INSTRUCTIONS
[facility]  Discharge Instructions: Post-Operative Open Umbilical or Ventral Hernia    ACTIVITY    Take frequent, short walks and increase your activity gradually.      Avoid strenuous physical activity or heavy lifting greater than 15lbs. for 3-4 weeks.  You may climb stairs.    You may drive without restrictions when you are not using any prescription pain medication and feel comfortable in a car.    You may return to work/school when you are comfortable without any prescription pain medication.    WOUND CARE    You may remove your bandage and shower 48 hours after the surgery.  Pat your incision dry and leave it open to air.  Re-apply dressing (Band-Aids or gauze/tape) as needed for comfort or drainage.    You may have steri-strips (looks like white tape) on your incision.  You may peel off the steri-strips 2 weeks after your surgery if they have not peeled off on their own.     Do not soak your incision in a tub or pool for 2 weeks.     Do not apply any lotions, creams, or ointments to your incision.    A ridge under your incision is normal and will gradually resolve.    DIET    Start with liquids, then gradually resume your regular diet as tolerated.     Drink plenty of fluids to stay hydrated.    PAIN    Expect some tenderness and discomfort at the incision site(s).  Use the prescribed pain medication at your discretion.  Expect gradual resolution of your pain over several days.    You may take ibuprofen with food (unless you have been told not to) instead of or in addition to your prescribed pain medication.  If you are taking Norco or Percocet, do not take any additional acetaminophen/APAP/Tylenol.      Do not drink alcohol or drive while you are taking pain medications.    You may apply ice to your incisions in 20 minute intervals as needed for the next 48 hours.  After that time, consider switching to heat if you prefer.    EXPECTATIONS    Pain medications can cause  constipation.  Limit use when possible.  Take over the counter stool softener/stimulant, such as Colace or Senna, 1-2 times a day with plenty of water.  You may take a mild over the counter laxative, such as Miralax or a suppository, as needed.  You may take 1 oz. (2 tablespoons) Milk of Magnesia the evening following surgery to encourage bowel movement.    You may discontinue these medications once you are having regular bowel movements and/or are no longer taking your narcotic pain medication.      FOLLOW UP    Our office will contact you approximately 2 weeks to check on your progress and answer any questions you may have.  If you are doing well, you will not need to return for a follow up appointment.  If any concerns are identified over the phone, we will help you make an appointment to see a provider.     If you have not received a phone call, have any questions or concerns, or would like to be seen, please call us at [phone] and ask to speak with our nurse.  We are located at [address] [place] [address].    CALL OUR OFFICE -[phone] IF YOU HAVE:     Chills or fever above 101 F.    Increased redness, warmth, or drainage at your incisions.    Significant bleeding.    Pain not relieved by your pain medication or rest.    Increasing pain after the first 48 hours.    Any other concerns or questions.    Revised January 2018    Today you received Toradol, an antiinflammatory medication similar to Ibuprofen.  You should not take other antiinflammatory medication, such as Ibuprofen, Motrin, Advil, Aleve, Naprosyn, etc until 3pm        Same Day Surgery Discharge Instructions for  Sedation and General Anesthesia       It's not unusual to feel dizzy, light-headed or faint for up to 24 hours after surgery or while taking pain medication.  If you have these symptoms: sit for a few minutes before standing and have someone assist you when you get up to walk or use the bathroom.      You  should rest and relax for the next 24 hours. We recommend you make arrangements to have an adult stay with you for at least 24 hours after your discharge.  Avoid hazardous and strenuous activity.      DO NOT DRIVE any vehicle or operate mechanical equipment for 24 hours following the end of your surgery.  Even though you may feel normal, your reactions may be affected by the medication you have received.      Do not drink alcoholic beverages for 24 hours following surgery.       Slowly progress to your regular diet as you feel able. It's not unusual to feel nauseated and/or vomit after receiving anesthesia.  If you develop these symptoms, drink clear liquids (apple juice, ginger ale, broth, 7-up, etc. ) until you feel better.  If your nausea and vomiting persists for 24 hours, please notify your surgeon.        All narcotic pain medications, along with inactivity and anesthesia, can cause constipation. Drinking plenty of liquids and increasing fiber intake will help.      For any questions of a medical nature, call your surgeon.      Do not make important decisions for 24 hours.      If you had general anesthesia, you may have a sore throat for a couple of days related to the breathing tube used during surgery.  You may use Cepacol lozenges to help with this discomfort.  If it worsens or if you develop a fever, contact your surgeon.       If you feel your pain is not well managed with the pain medications prescribed by your surgeon, please contact your surgeon's office to let them know so they can address your concerns.       CoVid 19 Information    We want to give you information regarding Covid. Please consult your primary care provider with any questions you might have.     Patient who have symptoms (cough, fever, or shortness of breath), need to isolate for 7 days from when symptoms started OR 72 hours after fever resolves (without fever reducing medications) AND improvement of respiratory symptoms (whichever is  longer).      Isolate yourself at home (in own room/own bathroom if possible)    Do Not allow any visitors    Do Not go to work or school    Do Not go to [employer],  centers, shopping, or other public places.    Do Not shake hands.    Avoid close and intimate contact with others (hugging, kissing).    Follow CDC recommendations for household cleaning of frequently touched services.     After the initial 7 days, continue to isolate yourself from household members as much as possible. To continue decrease the risk of community spread and exposure, you and any members of your household should limit activities in public for 14 days after starting home isolation.     You can reference the following CDC link for helpful home isolation/care tips:  https://www.cdc.gov/coronavirus/2019-ncov/downloads/10Things.pdf    Protect Others:    Cover Your Mouth and Nose with a mask, disposable tissue or wash cloth to avoid spreading germs to others.    Wash your hands and face frequently with soap and water    Call Your Primary Doctor If: Breathing difficulty develops or you become worse.    For more information about COVID19 and options for caring for yourself at home, please visit the CDC website at https://www.cdc.gov/coronavirus/2019-ncov/about/steps-when-sick.html  For more options for care at [facility], please visit our website at [email or web address]      **If you have concerns or questions about your procedure,    please contact Dr Ramachandran at  [phone]**

## 2020-05-29 NOTE — OR NURSING
Instructions reviewed with wife by phone. She cannot pick him up until 3:30 p.m.  Charge nurse informed.  Pt will remain in PACU.

## 2020-05-29 NOTE — ANESTHESIA CARE TRANSFER NOTE
Patient: Milan Carrillo    Procedure(s):  OPEN UMBILICAL HERNIA REPAIR WITH  MESH    Diagnosis: Umbilical hernia [K42.9]  Diagnosis Additional Information: No value filed.    Anesthesia Type:   General     Note:  Airway :Face Mask  Patient transferred to:PACU  Comments: At end of procedure, spontaneous respirations, patient alert to voice, able to follow commands. Oxygen via facemask at 6 liters per minute to PACU. Oxygen tubing connected to wall O2 in PACU, SpO2, NiBP, and EKG monitors and alarms on and functioning, report on patient's clinical status given to PACU RN, RN questions answered.Handoff Report: Identifed the Patient, Identified the Reponsible Provider, Reviewed the pertinent medical history, Discussed the surgical course, Reviewed Intra-OP anesthesia mangement and issues during anesthesia, Set expectations for post-procedure period and Allowed opportunity for questions and acknowledgement of understanding      Vitals: (Last set prior to Anesthesia Care Transfer)    CRNA VITALS  5/29/2020 0820 - 5/29/2020 0857      5/29/2020             Resp Rate (set):  10                Electronically Signed By: KISHAN Bingham CRNA  May 29, 2020  8:57 AM

## 2020-05-29 NOTE — ANESTHESIA POSTPROCEDURE EVALUATION
Patient: Milan Carrillo    Procedure(s):  OPEN UMBILICAL HERNIA REPAIR WITH  MESH    Diagnosis:Umbilical hernia [K42.9]  Diagnosis Additional Information: No value filed.    Anesthesia Type:  General    Note:  Anesthesia Post Evaluation    Patient location during evaluation: PACU  Patient participation: Able to fully participate in evaluation  Level of consciousness: awake  Pain management: adequate  Airway patency: patent  Cardiovascular status: acceptable  Respiratory status: acceptable  Hydration status: acceptable  PONV: none     Anesthetic complications: None          Last vitals:  Vitals:    05/29/20 1015 05/29/20 1030 05/29/20 1035   BP: 135/89 116/77    Pulse: 58 51    Resp: 14 8 9   Temp:      SpO2: 90% (!) 88% 92%         Electronically Signed By: Yvette Lobato MD, MD  May 29, 2020  10:50 AM

## 2020-05-29 NOTE — OP NOTE
General Surgery Operative Note    PREOPERATIVE DIAGNOSIS:  Umbilical hernia [K42.9]    POSTOPERATIVE DIAGNOSIS:  same    PROCEDURE:    OPEN UMBILICAL HERNIA REPAIR WITH  MESH    ANESTHESIA:  MAC and local    PREOPERATIVE MEDICATIONS:  Ancef IV.    SURGEON:  Curt Ramachandran MD    ASSISTANT:  Rain Campuzano PA-C  First assistant was necessary due to challenging exposure and the need for improved visualization and help maintaining hemostasis.    INDICATIONS:  Umbilical Hernia    DESCRIPTION OF PROCEDURE: The patient was taken to the operating suite and given IV sedation. The area around the umbilicus was prepped and draped in a sterile fashion. Surgeon initiated timeout was acknowledged. We made a curvilinear incision above the umbilicus and took this down through skin and subcutaneous tissue. I dissected this down to the level of the fascia and began clearing the fascia off. I was able to get around the umbilical stalk with a clamp and I cut the umbilical stalk off of the underlying hernia sac and fascial edge. I cleared off the fascial edge around the hernia defect which measured approximately 2 cm. I  the sac from the mouth of the hernia and was able to dunk the sac and contents, which were preperitoneal fat back into the abdomen. I then cleared out the preperitoneal space for a distance of several centimeters circumferentially. I deployed an 6.4 cm Ventralux patch within this space. I used four 0- Vicryl sutures at the apices to secure the mesh prior to deployment. Once I was satisfied with the position of the mesh in the preperitoneal space, I reapproximated the fascia transversely over the mesh using several interrupted 2-0 PDS sutures. The wound was irrigated. I then tacked the underside of the umbilical skin down to the fascia using a 3-0 Vicryl. Wound was closed in layers using 3-0 and 4-0 Vicryl and Steri-Strips. At the conclusion of the case, all lap and needle counts were correct.     ESTIMATED BLOOD  LOSS:  2 mL    INTRAOPERATIVE FINDINGS:  2 cm umbilical hernia    Curt Ramachandran MD, MD

## 2020-05-29 NOTE — OR NURSING
Instructions reviewed with pt and with his wife by phone. Has tolerated PO intake.  Able to void.  O2 sats> 90.  Sent home with abdominal binder and incentive spirometer.

## 2020-06-11 ENCOUNTER — TELEPHONE (OUTPATIENT)
Dept: SURGERY | Facility: CLINIC | Age: 45
End: 2020-06-11

## 2020-06-11 NOTE — TELEPHONE ENCOUNTER
[facility]  Post op call note     Milan Carrillo was called for an update regarding his recovery.  He underwent an umbilical hernia repair by Dr. Ramachandran on 5/29/2020.  Today he tells me he is doing fairly well.  He complains of some redness at the incision after surgery but states it is decreasing.  He denies a fever or drainage. He also reports tenderness about 3 inches to the right.  We discussed signs of infection and he was instructed to monitor the wounds and call if his symptoms worsen.  He is eating a normal diet and his bowels are regular.  He was instructed to continue to keep his wounds clean and dry and continue to avoid heavy lifting for 1-2 weeks. The patient states all of his questions were answered and he agrees to follow up in clinic as needed.      Rain Campuzano PA-C

## 2020-08-12 ENCOUNTER — RESULTS ONLY (OUTPATIENT)
Dept: LAB | Age: 45
End: 2020-08-12

## 2020-08-15 LAB
SARS-COV-2 RNA SPEC QL NAA+PROBE: NOT DETECTED
SPECIMEN SOURCE: NORMAL

## 2020-11-29 ENCOUNTER — HEALTH MAINTENANCE LETTER (OUTPATIENT)
Age: 45
End: 2020-11-29

## 2021-02-10 NOTE — TELEPHONE ENCOUNTER
Order states to include the biceps.    Received call from pt's sister, Daisha Denny. 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 This RN contacted NP, Yvette Potts, to notify her of the issue. Per Yvette Potts, for pt's safety, cancel dc for tonight and let Daisha Denny know that she needs to be here at 1000 2/11 with medications in hand to  pt. Harmanjudie Eduin states understanding. Daisha Denny spoke with pt to let her know of dc plans.

## 2021-06-07 ENCOUNTER — OFFICE VISIT - HEALTHEAST (OUTPATIENT)
Dept: FAMILY MEDICINE | Facility: CLINIC | Age: 46
End: 2021-06-07

## 2021-06-07 DIAGNOSIS — M25.50 ARTHRALGIA, UNSPECIFIED JOINT: ICD-10-CM

## 2021-06-07 DIAGNOSIS — Z00.00 ROUTINE MEDICAL EXAM: ICD-10-CM

## 2021-06-07 DIAGNOSIS — Z13.220 SCREENING, LIPID: ICD-10-CM

## 2021-06-07 DIAGNOSIS — R53.82 CHRONIC FATIGUE: ICD-10-CM

## 2021-06-07 LAB
ALBUMIN SERPL-MCNC: 3.9 G/DL (ref 3.5–5)
ALP SERPL-CCNC: 156 U/L (ref 45–120)
ALT SERPL W P-5'-P-CCNC: 76 U/L (ref 0–45)
ANION GAP SERPL CALCULATED.3IONS-SCNC: 10 MMOL/L (ref 5–18)
AST SERPL W P-5'-P-CCNC: 45 U/L (ref 0–40)
BASOPHILS # BLD AUTO: 0.1 THOU/UL (ref 0–0.2)
BASOPHILS NFR BLD AUTO: 1 % (ref 0–2)
BILIRUB SERPL-MCNC: 0.5 MG/DL (ref 0–1)
BUN SERPL-MCNC: 12 MG/DL (ref 8–22)
C REACTIVE PROTEIN LHE: 0.2 MG/DL (ref 0–0.8)
CALCIUM SERPL-MCNC: 9.4 MG/DL (ref 8.5–10.5)
CHLORIDE BLD-SCNC: 104 MMOL/L (ref 98–107)
CHOLEST SERPL-MCNC: 233 MG/DL
CO2 SERPL-SCNC: 24 MMOL/L (ref 22–31)
CREAT SERPL-MCNC: 1.07 MG/DL (ref 0.7–1.3)
EOSINOPHIL # BLD AUTO: 0.2 THOU/UL (ref 0–0.4)
EOSINOPHIL NFR BLD AUTO: 3 % (ref 0–6)
ERYTHROCYTE [DISTWIDTH] IN BLOOD BY AUTOMATED COUNT: 13 % (ref 11–14.5)
ERYTHROCYTE [SEDIMENTATION RATE] IN BLOOD BY WESTERGREN METHOD: 4 MM/HR (ref 0–15)
FASTING STATUS PATIENT QL REPORTED: YES
FERRITIN SERPL-MCNC: 422 NG/ML (ref 27–300)
GFR SERPL CREATININE-BSD FRML MDRD: >60 ML/MIN/1.73M2
GLUCOSE BLD-MCNC: 246 MG/DL (ref 70–125)
HCT VFR BLD AUTO: 47 % (ref 40–54)
HDLC SERPL-MCNC: 28 MG/DL
HGB BLD-MCNC: 16.4 G/DL (ref 14–18)
IMM GRANULOCYTES # BLD: 0 THOU/UL
IMM GRANULOCYTES NFR BLD: 1 %
IRON SATN MFR SERPL: 30 % (ref 20–50)
IRON SERPL-MCNC: 113 UG/DL (ref 42–175)
LDLC SERPL CALC-MCNC: ABNORMAL MG/DL
LYMPHOCYTES # BLD AUTO: 2.7 THOU/UL (ref 0.8–4.4)
LYMPHOCYTES NFR BLD AUTO: 49 % (ref 20–40)
MCH RBC QN AUTO: 29 PG (ref 27–34)
MCHC RBC AUTO-ENTMCNC: 34.9 G/DL (ref 32–36)
MCV RBC AUTO: 83 FL (ref 80–100)
MONOCYTES # BLD AUTO: 0.4 THOU/UL (ref 0–0.9)
MONOCYTES NFR BLD AUTO: 7 % (ref 2–10)
NEUTROPHILS # BLD AUTO: 2.1 THOU/UL (ref 2–7.7)
NEUTROPHILS NFR BLD AUTO: 39 % (ref 50–70)
PLATELET # BLD AUTO: 226 THOU/UL (ref 140–440)
PMV BLD AUTO: 9.4 FL (ref 7–10)
POTASSIUM BLD-SCNC: 4.1 MMOL/L (ref 3.5–5)
PROT SERPL-MCNC: 7.6 G/DL (ref 6–8)
RBC # BLD AUTO: 5.65 MILL/UL (ref 4.4–6.2)
RHEUMATOID FACT SERPL-ACNC: <15 IU/ML (ref 0–30)
SODIUM SERPL-SCNC: 138 MMOL/L (ref 136–145)
TIBC SERPL-MCNC: 380 UG/DL (ref 313–563)
TRANSFERRIN SERPL-MCNC: 304 MG/DL (ref 212–360)
TRIGL SERPL-MCNC: 748 MG/DL
TSH SERPL DL<=0.005 MIU/L-ACNC: 4.39 UIU/ML (ref 0.3–5)
VIT B12 SERPL-MCNC: 515 PG/ML (ref 213–816)
WBC: 5.5 THOU/UL (ref 4–11)

## 2021-06-07 ASSESSMENT — PATIENT HEALTH QUESTIONNAIRE - PHQ9: SUM OF ALL RESPONSES TO PHQ QUESTIONS 1-9: 8

## 2021-06-07 ASSESSMENT — ANXIETY QUESTIONNAIRES
5. BEING SO RESTLESS THAT IT IS HARD TO SIT STILL: NOT AT ALL
2. NOT BEING ABLE TO STOP OR CONTROL WORRYING: NOT AT ALL
6. BECOMING EASILY ANNOYED OR IRRITABLE: SEVERAL DAYS
3. WORRYING TOO MUCH ABOUT DIFFERENT THINGS: NOT AT ALL
1. FEELING NERVOUS, ANXIOUS, OR ON EDGE: NOT AT ALL
5. BEING SO RESTLESS THAT IT IS HARD TO SIT STILL: NOT AT ALL
1. FEELING NERVOUS, ANXIOUS, OR ON EDGE: NOT AT ALL
GAD7 TOTAL SCORE: 1
3. WORRYING TOO MUCH ABOUT DIFFERENT THINGS: NOT AT ALL
4. TROUBLE RELAXING: NOT AT ALL
7. FEELING AFRAID AS IF SOMETHING AWFUL MIGHT HAPPEN: NOT AT ALL
IF YOU CHECKED OFF ANY PROBLEMS ON THIS QUESTIONNAIRE, HOW DIFFICULT HAVE THESE PROBLEMS MADE IT FOR YOU TO DO YOUR WORK, TAKE CARE OF THINGS AT HOME, OR GET ALONG WITH OTHER PEOPLE: SOMEWHAT DIFFICULT
2. NOT BEING ABLE TO STOP OR CONTROL WORRYING: NOT AT ALL
4. TROUBLE RELAXING: NOT AT ALL
7. FEELING AFRAID AS IF SOMETHING AWFUL MIGHT HAPPEN: NOT AT ALL
6. BECOMING EASILY ANNOYED OR IRRITABLE: SEVERAL DAYS
GAD7 TOTAL SCORE: 1

## 2021-06-07 ASSESSMENT — MIFFLIN-ST. JEOR: SCORE: 2134.69

## 2021-06-08 LAB
25(OH)D3 SERPL-MCNC: 7.8 NG/ML (ref 30–80)
ANA SER QL: 0.5 U
B BURGDOR IGG+IGM SER QL: 0.12 INDEX VALUE

## 2021-06-10 LAB
SHBG SERPL-SCNC: 66 NMOL/L (ref 11–80)
TESTOST FREE SERPL-MCNC: 5.73 NG/DL (ref 4.7–24.4)
TESTOST SERPL-MCNC: 451 NG/DL (ref 240–950)

## 2021-06-11 ENCOUNTER — OFFICE VISIT - HEALTHEAST (OUTPATIENT)
Dept: FAMILY MEDICINE | Facility: CLINIC | Age: 46
End: 2021-06-11

## 2021-06-11 DIAGNOSIS — E11.65 TYPE 2 DIABETES MELLITUS WITH HYPERGLYCEMIA, WITHOUT LONG-TERM CURRENT USE OF INSULIN (H): ICD-10-CM

## 2021-06-11 DIAGNOSIS — E66.01 MORBID OBESITY (H): ICD-10-CM

## 2021-06-11 LAB
HBA1C MFR BLD: 9.5 %
LDLC SERPL CALC-MCNC: 163 MG/DL

## 2021-06-26 NOTE — PROGRESS NOTES
MALE PREVENTATIVE EXAM    Assessment and Plan:     Patient has been advised of split billing requirements and indicates understanding: Yes    1. Routine medical exam    Generally the patient is doing very well.  We discussed healthy lifestyles, including adequate exercise (3-4 times a week for 20-30 minutes), and a healthy diet.  Patient should return for annual physicals, and we can also see them here as needed.       2. Chronic fatigue    With his fatigue and some joint aches the following lab work was done today. We can follow-up with him when the results of the become available. If anything comes up obviously we will address the abnormalities as they arise.    We did discuss healthy diets and adequate exercise including daily exercise if possible as well as getting adequate sleep. He understands all of this and will try to improve his diet and improve his exercise time of that he spends doing that. As mentioned we can follow-up with him when the results of the become available and make some plans accordingly.      - HM1(CBC and Differential)  - Comprehensive Metabolic Panel  - Thyroid Stimulating Hormone (TSH)  - Iron and Transferrin Iron Binding Capacity  - Ferritin  - Vitamin B12  - Vitamin D, Total (25-Hydroxy)  - Erythrocyte Sedimentation Rate  - C-Reactive Protein  - Testosterone, Free and Total (BTEST)    3. Arthralgia, unspecified joint    - Rheumatoid Factor Quant  - Antinuclear Antibody (KANDACE) [place]  - Lyme Antibody [place]    4. Screening, lipid    - Lipid Profile        Next follow up:  No follow-ups on file.    Immunization Review  Adult Imm Review: No immunizations due today  BMI: 37      I discussed the following with the patient:   Adult Healthy Living: Importance of regular exercise  Healthy nutrition  Getting adequate sleep  Stress management  Use of seat belts        Subjective:   Chief Complaint: Milan Carrillo is an 46 y.o. male here for a preventative health visit.    Patient has been  advised of split billing requirements and indicates understanding: Yes  HPI: New patient to our clinic here. He has been within the [facility] system in the past at [facility].    He is generally doing well and wants to have a an overall physical today, but he does have one issue that overrides everything. He does states that he has been fatigued and has had malaise for years. He feels like it is getting worse instead of better. He just finds himself not having him as much energy as he would like to, getting tired in the evening, not really having the energy during the day to do the things that he would like to do. He does feel like he is depressed but he just feels like something is off and that he is not able to do what he would like to do. He has not really had extensive blood work done for this before. He doesn't exercise really much at all. He tries to eat well when he can. He thinks his sleep is adequate and he doesn't think there is any issues with sleep apnea.    He also complains of a few joint aches that are fairly nonspecific but seem to affect the shoulders and the hips and the knees more than the others. They seem to be symmetric and migratory. No redness or swelling or warmth that the patient is noticed.    Healthy Habits  Are you taking a daily aspirin? No  Do you typically exercising at least 40 min, 3-4 times per week?  NO  Do you usually eat at least 4 servings of fruit and vegetables a day, include whole grains and fiber and avoid regularly eating high fat foods? NO  Have you had an eye exam in the past two years? Yes  Do you see a dentist twice per year? Yes  Do you have any concerns regarding sleep? YES, trouble staying asleep - had a sleep study about 1.5 years ago and was normal - HAS HAD CPAP IN THE PAST    Do you feel you are safe where you are living?: Yes (8/23/2020  8:22 PM)  Do you feel you are safe in your relationship(s)?: Yes (8/23/2020  8:22 PM)      Review of Systems:  Please see  "above.  The rest of the review of systems are negative for all systems.     Cancer Screening     Patient has no health maintenance due at this time          Patient Care Team:  Sandor Sanford MD as PCP - General (Family Medicine)        History     Reviewed By Date/Time Sections Reviewed    Sandor Sanford MD 6/7/2021  9:38 AM Medical, Surgical, Tobacco, Alcohol, Drug Use, Sexual Activity, Family    Renetta Ruiz, [facility] 6/7/2021  9:06 AM Tobacco, Alcohol, Drug Use        Patient is new patient to the clinic. Please see past medical history, surgical history, social history and family history, all of which were completed in their entirety today.     Objective:   Vital Signs:   Visit Vitals  BP (!) 130/94 (Patient Site: Left Arm, Patient Position: Sitting, Cuff Size: Adult Large)   Pulse 77   Ht 5' 10.75\" (1.797 m)   Wt (!) 270 lb 6.4 oz (122.7 kg)   SpO2 98%   BMI 37.98 kg/m           PHYSICAL EXAM    Well developed, well nourished, no acute distress.  HEENT: normocephalic/atraumatic, PERRLA/EOMI, TMs: Gray, normal light reflex, no nasal discharge.  Oral mucosa: no erythema/exudate  Neck: No LAD/masses/thyromegaly/bruits  Lungs: clear bilaterally  Heart: regular rate and rhythm, no murmurs/gallops/rubs.  Abdomen: Normal bowel sounds, soft, non-tender, non-distended, no masses, neg Gallagher's/McBurney's, no rebound/guarding  Genital: Bilaterally descended testes, no masses, non-tender, no hernia.  No urethral discharge or erythema.  No lesions, normal phallus.  Lymphatics: no supraclavicular/axillary/epitrochlear/inguinal LAD. No edema.  Neuro: A&O x 3, CN II-XII intact, strength 5/5, reflexes symmetric, sensory intact to light touch.  Psych: Behavior appropriate, engaging.  Thought processes congruent, non-tangential.  Musculoskeletal: no gross deformities.  Skin: no rashes or lesions.                  Medication List      as of June 7, 2021 11:59 PM     You have not been prescribed any medications.         Additional " Screenings Completed Today:

## 2021-06-26 NOTE — PROGRESS NOTES
Assessment & Plan     Type 2 diabetes mellitus with hyperglycemia, without long-term current use of insulin (H)    We discussed his test results today and gave him the news that he indeed does have diabetes.  We have that to his problem list and get him started with her usual diabetic plan.  That includes getting an A1c today as well as enlisting him and with the help of our esteemed colleagues over at the diabetic education program.  They will contact him and they will do a great job getting him level set on his education needs for his diabetes.  He seems well motivated and he seems interested in really trying to get on top of this quickly so that is good.    I did start him on Metformin 5 mg twice a day, that working his way up to 1000 mg twice a day over the next week or so.  With his A1c being where it is we can see how the Metformin works at first and then we can add something to it as needed he already has some interest in using one of the injectable medications that might help him also lose weight but also manage his sugars better, we can see how he does with the Metformin and then possibly refer him to our pharmacy team to help him start on 1 of those medications.    At that to see him back in about 6 weeks and recheck his A1c after he has been doing work with diet and exercise and seeing our diabetic educators.      - Glycosylated Hemoglobin A1c  - LDL Cholesterol, Direct  - Ambulatory referral to Diabetes Education Program (CDE)  - metFORMIN (GLUCOPHAGE) 500 MG tablet; Take 2 tablets (1,000 mg total) by mouth 2 (two) times a day with meals.  - ergocalciferol (ERGOCALCIFEROL) 1,250 mcg (50,000 unit) capsule; Take 1 capsule (50,000 Units total) by mouth once a week for 12 doses.    Morbid obesity (H)    As mentioned above we did talk about losing weight and hopefully that will help with the diet changes that is going to be making hopefully getting some regular exercise.    We also talked about his  "vitamin D being very low and I did add a weekly vitamin D supplementation which might help him feel more energetic as well.      Review of the result(s) of each unique test - labs done  Ordering of each unique test  Prescription drug management  45 minutes spent on the date of the encounter doing chart review, review of outside records, review of test results, interpretation of tests, patient visit, documentation and discussion with family        BMI:   Estimated body mass index is 37.94 kg/m  as calculated from the following:    Height as of 6/7/21: 5' 10.75\" (1.797 m).    Weight as of this encounter: 270 lb 1.6 oz (122.5 kg).   The following high BMI interventions were performed this visit: encouragement to exercise, weight monitoring, dietary needs education and lifestyle education regarding diet    No follow-ups on file.    Sandor Sanford MD  [facility]   Milan Carrillo is 46 y.o. and presents today for the following health issues   HPI     Patient here today to discuss his recent blood work that showed that his fasting blood sugar was very high, or 200.  This obviously puts him in a diabetic range and we are here today to do an A1c and a bit more blood work to confirm the diagnosis and the talk about our program with diabetics when we have her start them off including education as well as medication and the program that we have going forward.  He also was found to have a very low vitamin D and so we talked about that today as well as his weight trying to get that down with exercise and diet.    Review of Systems        Objective    BP (!) 130/98 (Patient Site: Left Arm, Patient Position: Sitting, Cuff Size: Adult Large)   Pulse 76   Wt (!) 270 lb 1.6 oz (122.5 kg)   SpO2 96%   BMI 37.94 kg/m    Body mass index is 37.94 kg/m .  Physical Exam                "

## 2021-07-06 VITALS
SYSTOLIC BLOOD PRESSURE: 130 MMHG | BODY MASS INDEX: 37.85 KG/M2 | OXYGEN SATURATION: 98 % | DIASTOLIC BLOOD PRESSURE: 94 MMHG | HEART RATE: 77 BPM | WEIGHT: 270.4 LBS | HEIGHT: 71 IN

## 2021-07-06 VITALS
SYSTOLIC BLOOD PRESSURE: 130 MMHG | BODY MASS INDEX: 37.94 KG/M2 | OXYGEN SATURATION: 96 % | DIASTOLIC BLOOD PRESSURE: 98 MMHG | HEART RATE: 76 BPM | WEIGHT: 270.1 LBS

## 2021-07-06 ASSESSMENT — PATIENT HEALTH QUESTIONNAIRE - PHQ9: SUM OF ALL RESPONSES TO PHQ QUESTIONS 1-9: 8

## 2021-07-08 ASSESSMENT — ANXIETY QUESTIONNAIRES: GAD7 TOTAL SCORE: 1

## 2021-07-26 ENCOUNTER — OFFICE VISIT (OUTPATIENT)
Dept: FAMILY MEDICINE | Facility: CLINIC | Age: 46
End: 2021-07-26
Payer: COMMERCIAL

## 2021-07-26 VITALS
BODY MASS INDEX: 38.06 KG/M2 | DIASTOLIC BLOOD PRESSURE: 82 MMHG | OXYGEN SATURATION: 97 % | HEART RATE: 67 BPM | WEIGHT: 271 LBS | SYSTOLIC BLOOD PRESSURE: 124 MMHG

## 2021-07-26 DIAGNOSIS — E11.65 TYPE 2 DIABETES MELLITUS WITH HYPERGLYCEMIA, WITHOUT LONG-TERM CURRENT USE OF INSULIN (H): Primary | ICD-10-CM

## 2021-07-26 DIAGNOSIS — E66.01 MORBID OBESITY (H): ICD-10-CM

## 2021-07-26 DIAGNOSIS — E55.9 VITAMIN D DEFICIENCY: ICD-10-CM

## 2021-07-26 DIAGNOSIS — E78.2 MIXED HYPERLIPIDEMIA: ICD-10-CM

## 2021-07-26 PROBLEM — E11.9 DIABETES MELLITUS, TYPE 2 (H): Status: ACTIVE | Noted: 2021-07-26

## 2021-07-26 LAB
ALBUMIN SERPL-MCNC: 4.1 G/DL (ref 3.5–5)
ALP SERPL-CCNC: 99 U/L (ref 45–120)
ALT SERPL W P-5'-P-CCNC: 86 U/L (ref 0–45)
ANION GAP SERPL CALCULATED.3IONS-SCNC: 11 MMOL/L (ref 5–18)
AST SERPL W P-5'-P-CCNC: 50 U/L (ref 0–40)
BILIRUB SERPL-MCNC: 0.8 MG/DL (ref 0–1)
BUN SERPL-MCNC: 15 MG/DL (ref 8–22)
CALCIUM SERPL-MCNC: 9.8 MG/DL (ref 8.5–10.5)
CHLORIDE BLD-SCNC: 109 MMOL/L (ref 98–107)
CO2 SERPL-SCNC: 21 MMOL/L (ref 22–31)
CREAT SERPL-MCNC: 1.07 MG/DL (ref 0.7–1.3)
GFR SERPL CREATININE-BSD FRML MDRD: 84 ML/MIN/1.73M2
GLUCOSE BLD-MCNC: 117 MG/DL (ref 70–125)
HBA1C MFR BLD: 7.3 % (ref 0–5.6)
POTASSIUM BLD-SCNC: 4.5 MMOL/L (ref 3.5–5)
PROT SERPL-MCNC: 7.4 G/DL (ref 6–8)
SODIUM SERPL-SCNC: 141 MMOL/L (ref 136–145)

## 2021-07-26 PROCEDURE — 80053 COMPREHEN METABOLIC PANEL: CPT | Performed by: FAMILY MEDICINE

## 2021-07-26 PROCEDURE — 36415 COLL VENOUS BLD VENIPUNCTURE: CPT | Performed by: FAMILY MEDICINE

## 2021-07-26 PROCEDURE — 83036 HEMOGLOBIN GLYCOSYLATED A1C: CPT | Performed by: FAMILY MEDICINE

## 2021-07-26 PROCEDURE — 99214 OFFICE O/P EST MOD 30 MIN: CPT | Performed by: FAMILY MEDICINE

## 2021-07-26 RX ORDER — ERGOCALCIFEROL 1.25 MG/1
CAPSULE, LIQUID FILLED ORAL
COMMUNITY
Start: 2021-07-06 | End: 2021-12-28

## 2021-07-26 RX ORDER — CRANBERRY FRUIT EXTRACT 200 MG
2 CAPSULE ORAL
COMMUNITY
End: 2024-03-05

## 2021-07-26 NOTE — PROGRESS NOTES
"    Assessment & Plan     Type 2 diabetes mellitus with hyperglycemia, without long-term current use of insulin (H)    We did check an A1c today when there was really good improvement from last time to down in the 7% range.  We will keep him on the Metformin then as he has been doing and continue to encourage him to exercise and to eat better as he has been doing.  We will probably check in on him in a couple of months then and see how he is doing there.      - Hemoglobin A1c; Future  - Comprehensive metabolic panel; Future  - Hemoglobin A1c  - Comprehensive metabolic panel    Morbid obesity (H)    We again talked about weight loss and he is very really trying to do that with more vigorous exercise and to try to eat better which hopefully will help him as well.    Mixed hyperlipidemia    We will hold off on checking his cholesterol today as we just did that not too long ago but we can check it next time he comes in.    Vitamin D deficiency    Same way he is feeling a lot better, feeling better on the vitamin D supplementation that we are giving him, but we can check his level next time he comes in.      Review of the result(s) of each unique test - labs  Ordering of each unique test  Prescription drug management         BMI:   Estimated body mass index is 38.06 kg/m  as calculated from the following:    Height as of 6/7/21: 1.797 m (5' 10.75\").    Weight as of this encounter: 122.9 kg (271 lb).   Weight management plan: Discussed healthy diet and exercise guidelines        No follow-ups on file.    Sandor Sanford MD  [facility]   Milan is a 46 year old who presents for the following health issues     HPI     Patient is here today for a follow-up of recent diagnosis of diabetes mellitus type 2.  We diagnosed him with this about 6 weeks ago.  We started him on Metformin 1000 mg twice a day.  He has been tolerating that really well recently.  At first it bothers his stomach a " bit but he has settled into a pretty nicely now.  He brings a bunch of sugars and for me to look at and they are actually all quite in a good range.  He is gone to diabetic education and he really enjoyed that, feels like he learned quite a bit from that and has been trying to take those lessons into his life every day.  He has been eating a bit better and trying to exercise regularly as well.  Also he notes that he feels a lot better on vitamin D supplementation that we are giving him 1 week on his vitamin D level to be quite profoundly low and he feels already be has more energy and more vigor to his life with that.      Review of Systems   Constitutional, HEENT, cardiovascular, pulmonary, GI, , musculoskeletal, neuro, skin, endocrine and psych systems are negative, except as otherwise noted.      Objective    /82 (BP Location: Left arm, Patient Position: Sitting, Cuff Size: Adult Regular)   Pulse 67   Wt 122.9 kg (271 lb)   SpO2 97%   BMI 38.06 kg/m    Body mass index is 38.06 kg/m .  Physical Exam   GENERAL: healthy, alert and no distress  NECK: no adenopathy, no asymmetry, masses, or scars and thyroid normal to palpation  RESP: lungs clear to auscultation - no rales, rhonchi or wheezes  CV: regular rate and rhythm, normal S1 S2, no S3 or S4, no murmur, click or rub, no peripheral edema and peripheral pulses strong  ABDOMEN: soft, nontender, no hepatosplenomegaly, no masses and bowel sounds normal  MS: no gross musculoskeletal defects noted, no edema    Results for orders placed or performed in visit on 07/26/21 (from the past 24 hour(s))   Hemoglobin A1c   Result Value Ref Range    Hemoglobin A1C 7.3 (H) 0.0 - 5.6 %

## 2021-08-24 ENCOUNTER — ALLIED HEALTH/NURSE VISIT (OUTPATIENT)
Dept: EDUCATION SERVICES | Facility: CLINIC | Age: 46
End: 2021-08-24
Payer: COMMERCIAL

## 2021-08-24 VITALS — WEIGHT: 269 LBS | BODY MASS INDEX: 37.78 KG/M2

## 2021-08-24 DIAGNOSIS — E11.65 TYPE 2 DIABETES MELLITUS WITH HYPERGLYCEMIA, WITHOUT LONG-TERM CURRENT USE OF INSULIN (H): Primary | ICD-10-CM

## 2021-08-24 PROCEDURE — G0108 DIAB MANAGE TRN  PER INDIV: HCPCS | Mod: AE | Performed by: FAMILY MEDICINE

## 2021-08-24 NOTE — PROGRESS NOTES
Diabetes Self-Management Education & Support    Presents for:      Type of Visit: In Person    How would patient like to obtain AVS? Alysia    ASSESSMENT:    Milan is here alone today to follow-up on his Diabetes.  He had his A1c done recently, result was 7.3%, down from 9.5% in June of this year.    He has been taking Metformin 1000mg twice daily with meals.  Stated he is tolerating this well.  He does have trouble with upset stomach when eating a high carb meal and taking Metformin.  He had several questions about injectables medications and potential weight loss.  Discussed how GLP-1 medications work, possible side effects, possible weight loss and administration.      Milan he is checking his blood sugars multiple times a day.  Stated his fasting readings are usually running 120's-170's depending on if he ate carbs the night before.  If he has carbs his reading is lower, 120's.  If he did not have carbs in the evening then he reading is higher.  Discussed this could be kimber effect and what this is.  His readings later in the day are lower.  He does have higher readings with some foods.  Stated  The night he had Chinese food his reading was 210.  Discussed idea of CGM study to learn more about his blood sugar patterns.  He will think about this.    He is eating about 50 grams of carb for meals.  Stated he does not feel hungry between meals and does not snack most days.    He does not have regular activity at this time.  Stated this is something he will work on next.    Plan:  Continue working on meal planning and checking glucose.  Discuss medication change with primary.  Start working on getting activity in regularly.    Patient's most recent   Lab Results   Component Value Date    A1C 7.3 07/26/2021    A1C 5.4 05/30/2017    is not meeting goal of <7.0    Diabetes knowledge and skills assessment:   Patient is knowledgeable in diabetes management concepts related to: Healthy Eating, Monitoring, Taking  Medication and Healthy Coping    Continue education with the following diabetes management concepts: Being Active, Taking Medication, Reducing Risks and Healthy Coping    Based on learning assessment above, most appropriate setting for further diabetes education would be: Individual setting.    INTERVENTIONS:    Education provided today on:  AADE Self-Care Behaviors:  Diabetes Pathophysiology  Healthy Eating: consistency in amount, composition, and timing of food intake, weight reduction and portion control  Monitoring: purpose, log and interpret results and frequency of monitoring  Taking Medication: action of prescribed medication, proper site selection and rotation for injections, side effects of prescribed medications and when to take medications  Reducing Risks: prevention, early diagnostic measures and treatment of complications, foot care, appropriate dental care, annual eye exam, aspirin therapy and A1C - goals, relating to blood glucose levels, how often to check    Opportunities for ongoing education and support in diabetes-self management were discussed. Pt verbalized understanding of concepts discussed and recommendations provided today.       Education Materials Provided:  Caring For Diabetes          PLAN  Topics to cover at upcoming visits: Being Active, Taking Medication and Healthy Coping  Follow-up: 3 months    See Goals Section for co-developed, patient-stated behavior change goals.  AVS provided to patient today.          SUBJECTIVE / OBJECTIVE:  Diabetes education in the past 24mo: (P) Yes  Diabetes type: (P) Type 2  Disease course: (P) Improving  Diabetes management related comments/concerns: (P) How to manage high fasting sugar  Cultural Influences/Ethnic Background:  Not  or       Diabetes Symptoms & Complications:  Fatigue: (P) Sometimes  Neuropathy: (P) Sometimes  Polydipsia: (P) Sometimes  Polyphagia: (P) Sometimes  Polyuria: (P) No  Visual change: (P) Sometimes  Slow healing  "wounds: (P) Yes  Autonomic neuropathy: (P) No  CVA: (P) No  Heart disease: (P) No  Nephropathy: (P) No  Peripheral neuropathy: (P) No  Peripheral Vascular Disease: (P) No    Patient Problem List and Family Medical History reviewed for relevant medical history, current medical status, and diabetes risk factors.    Vitals:  Wt 122 kg (269 lb)   BMI 37.78 kg/m    Estimated body mass index is 37.78 kg/m  as calculated from the following:    Height as of 6/7/21: 1.797 m (5' 10.75\").    Weight as of this encounter: 122 kg (269 lb).   Last 3 BP:   BP Readings from Last 3 Encounters:   07/26/21 124/82   06/11/21 (!) 130/98   06/07/21 (!) 130/94       History   Smoking Status     Never Smoker   Smokeless Tobacco     Never Used       Labs:  Lab Results   Component Value Date    A1C 7.3 07/26/2021    A1C 5.4 05/30/2017     Lab Results   Component Value Date     07/26/2021     04/15/2020     Lab Results   Component Value Date     06/11/2021     05/30/2017     HDL Cholesterol   Date Value Ref Range Status   05/30/2017 35.1 (L) >40.0 mg/dL Final     Direct Measure HDL   Date Value Ref Range Status   06/07/2021 28 (L) >=40 mg/dL Final   ]  GFR Estimate   Date Value Ref Range Status   07/26/2021 84 >60 mL/min/1.73m2 Final     Comment:     As of July 11, 2021, eGFR is calculated by the CKD-EPI creatinine equation, without race adjustment. eGFR can be influenced by muscle mass, exercise, and diet. The reported eGFR is an estimation only and is only applicable if the renal function is stable.   06/07/2021 >60 >60 mL/min/1.73m2 Final   04/15/2020 84 >60 mL/min/[1.73_m2] Final     Comment:     Non  GFR Calc  Starting 12/18/2018, serum creatinine based estimated GFR (eGFR) will be   calculated using the Chronic Kidney Disease Epidemiology Collaboration   (CKD-EPI) equation.       GFR Estimate If Black   Date Value Ref Range Status   06/07/2021 >60 >60 mL/min/1.73m2 Final   04/15/2020 >90 >60 " mL/min/[1.73_m2] Final     Comment:      GFR Calc  Starting 12/18/2018, serum creatinine based estimated GFR (eGFR) will be   calculated using the Chronic Kidney Disease Epidemiology Collaboration   (CKD-EPI) equation.       Lab Results   Component Value Date    CR 1.07 07/26/2021    CR 1.08 04/15/2020     No results found for: MICROALBUMIN    Healthy Eating:  Cultural/Christianity diet restrictions?: (P) No  Meal planning/habits: (P) Avoiding sweets, Carb counting, Low carb  How many times a week on average do you eat food made away from home (restaurant/take-out)?: (P) 5+  Meals include: (P) Breakfast, Lunch, Dinner, Evening Snack  Beverages: (P) Water, Diet soda, Sports drinks, Energy drinks, Alcohol    Being Active:  Barrier to exercise: (P) Other    Monitoring:  Blood Glucose Meter: (P) Unknown  Times checking blood sugar at home (number): (P) 3  Times checking blood sugar at home (per): (P) Day  Blood glucose trend: (P) Decreasing    Glucose data:      Taking Medications:  Diabetes Medication(s)     Biguanides       metFORMIN (GLUCOPHAGE) 500 MG tablet    Take 1,000 mg by mouth          Current Treatments: (P) Oral Medication (taken by mouth)    Problem Solving:                 Reducing Risks:  CAD Risks: (P) Diabetes Mellitus, Dyslipidemia  Has dilated eye exam at least once a year?: (P) No  Sees dentist every 6 months?: (P) Yes  Feet checked by healthcare provider in the last year?: (P) Yes    Healthy Coping:  Informal Support system:: (P) Children, Family, Friends, Partner, Spouse  Patient Activation Measure Survey Score:  No flowsheet data found.           The service provided today was under the supervising provider, Stewart Recinos, who was available if needed.     Time Spent: 60 minutes  Encounter Type: Individual        Any diabetes medication dose changes were made via the Certified Diabetes Care & Education Protocol in collaboration with the patient's primary care provider. A copy of this  encounter was shared with the provider.

## 2021-08-24 NOTE — LETTER
8/24/2021         RE: Milan Carrillo  [address] [id number]        Dear Colleague,    Thank you for referring your patient, Milan Carrillo, to the [facility]. Please see a copy of my visit note below.

## 2021-09-02 RX ORDER — AMPICILLIN TRIHYDRATE 250 MG
2 CAPSULE ORAL DAILY
COMMUNITY
End: 2024-03-05

## 2021-09-19 ENCOUNTER — HEALTH MAINTENANCE LETTER (OUTPATIENT)
Age: 46
End: 2021-09-19

## 2021-09-27 ENCOUNTER — OFFICE VISIT (OUTPATIENT)
Dept: FAMILY MEDICINE | Facility: CLINIC | Age: 46
End: 2021-09-27
Payer: COMMERCIAL

## 2021-09-27 VITALS
DIASTOLIC BLOOD PRESSURE: 86 MMHG | BODY MASS INDEX: 37.64 KG/M2 | WEIGHT: 268 LBS | HEART RATE: 68 BPM | SYSTOLIC BLOOD PRESSURE: 124 MMHG | OXYGEN SATURATION: 96 %

## 2021-09-27 DIAGNOSIS — N52.9 ERECTILE DYSFUNCTION, UNSPECIFIED ERECTILE DYSFUNCTION TYPE: ICD-10-CM

## 2021-09-27 DIAGNOSIS — E11.9 TYPE 2 DIABETES MELLITUS WITHOUT COMPLICATION, WITHOUT LONG-TERM CURRENT USE OF INSULIN (H): Primary | ICD-10-CM

## 2021-09-27 DIAGNOSIS — E78.2 MIXED HYPERLIPIDEMIA: ICD-10-CM

## 2021-09-27 LAB
ALBUMIN SERPL-MCNC: 4.3 G/DL (ref 3.5–5)
ALP SERPL-CCNC: 80 U/L (ref 45–120)
ALT SERPL W P-5'-P-CCNC: 68 U/L (ref 0–45)
ANION GAP SERPL CALCULATED.3IONS-SCNC: 13 MMOL/L (ref 5–18)
AST SERPL W P-5'-P-CCNC: 40 U/L (ref 0–40)
BILIRUB SERPL-MCNC: 0.6 MG/DL (ref 0–1)
BUN SERPL-MCNC: 15 MG/DL (ref 8–22)
CALCIUM SERPL-MCNC: 10 MG/DL (ref 8.5–10.5)
CHLORIDE BLD-SCNC: 107 MMOL/L (ref 98–107)
CHOLEST SERPL-MCNC: 175 MG/DL
CO2 SERPL-SCNC: 22 MMOL/L (ref 22–31)
CREAT SERPL-MCNC: 1.16 MG/DL (ref 0.7–1.3)
CREAT UR-MCNC: 216 MG/DL
FASTING STATUS PATIENT QL REPORTED: ABNORMAL
GFR SERPL CREATININE-BSD FRML MDRD: 76 ML/MIN/1.73M2
GLUCOSE BLD-MCNC: 102 MG/DL (ref 70–125)
HBA1C MFR BLD: 6.1 % (ref 0–5.6)
HDLC SERPL-MCNC: 37 MG/DL
LDLC SERPL CALC-MCNC: 99 MG/DL
MICROALBUMIN UR-MCNC: 1.51 MG/DL (ref 0–1.99)
MICROALBUMIN/CREAT UR: 7 MG/G CR
POTASSIUM BLD-SCNC: 4.1 MMOL/L (ref 3.5–5)
PROT SERPL-MCNC: 7.8 G/DL (ref 6–8)
SODIUM SERPL-SCNC: 142 MMOL/L (ref 136–145)
TRIGL SERPL-MCNC: 194 MG/DL

## 2021-09-27 PROCEDURE — 82043 UR ALBUMIN QUANTITATIVE: CPT | Performed by: FAMILY MEDICINE

## 2021-09-27 PROCEDURE — 80061 LIPID PANEL: CPT | Performed by: FAMILY MEDICINE

## 2021-09-27 PROCEDURE — 80053 COMPREHEN METABOLIC PANEL: CPT | Performed by: FAMILY MEDICINE

## 2021-09-27 PROCEDURE — 83036 HEMOGLOBIN GLYCOSYLATED A1C: CPT | Performed by: FAMILY MEDICINE

## 2021-09-27 PROCEDURE — 36415 COLL VENOUS BLD VENIPUNCTURE: CPT | Performed by: FAMILY MEDICINE

## 2021-09-27 PROCEDURE — 99214 OFFICE O/P EST MOD 30 MIN: CPT | Performed by: FAMILY MEDICINE

## 2021-09-27 RX ORDER — SILDENAFIL CITRATE 20 MG/1
TABLET ORAL
Qty: 90 TABLET | Refills: 3 | Status: SHIPPED | OUTPATIENT
Start: 2021-09-27

## 2021-09-27 NOTE — PROGRESS NOTES
Assessment & Plan     Type 2 diabetes mellitus without complication, without long-term current use of insulin (H)    We will switch him then to semaglutide at a low dose of 0.25 mg every 7 days.  We will discontinue the Metformin as it is giving him a lot of side effects.  We can also see if it helps his weight at all and hopefully that will be the case.  Check some labs today as below.  We can follow-up with him results when become available.  We will need to see him again however in about 3 months time to see how he is doing on this medication he will let us know if he is having any trouble with it in the meantime.      - semaglutide (OZEMPIC) 2 MG/1.5ML SOPN pen; Inject 0.25 mg Subcutaneous every 7 days  - Hemoglobin A1c; Future  - Comprehensive metabolic panel; Future  - Lipid panel reflex to direct LDL Fasting; Future  - Albumin Random Urine Quantitative with Creat Ratio; Future  - Hemoglobin A1c  - Comprehensive metabolic panel  - Lipid panel reflex to direct LDL Fasting  - Albumin Random Urine Quantitative with Creat Ratio    Mixed hyperlipidemia    Labs checked as above and we can follow-up on his cholesterol.    Erectile dysfunction, unspecified erectile dysfunction type    He did bring up erection troubles today and we did talk about this and decided to start some sildenafil that he can take as directed.  I gave him the good Rx card and talked about how that works best, as well as side effects and potential interactions of the medication.  We can talk about how that is working when he comes back to follow-up as well.      - sildenafil (REVATIO) 20 MG tablet; Take 3-4 tabs as directed before sexual activity    Review of the result(s) of each unique test - labs  Ordering of each unique test  Prescription drug management             No follow-ups on file.    Sandor Sanford MD  [facility]    Jazmine Yates is a 46 year old who presents for the following health issues      HPI     Patient here today to follow-up on his diabetes.  He is on Metformin 1000 mg twice a day and he expresses some interest in switching to semaglutide because he is read about the weight loss benefits to that drug.  Also he is having a lot of side effects with the Metformin and that he is having a lot of stomach upset and bowel issues so he like to try something a little bit differently if possible.  He states that his sugars have actually been pretty good except for a small period of time where he did not quite follow a good diet but now it has been better.    He also brings up erection trouble today.  He states that he has a hard time getting an erection satisfactory for intercourse.  Once he gets that he can usually perform, but it is getting if it is the trouble.  He has not tried anything like sildenafil in the past.      Review of Systems   Constitutional, HEENT, cardiovascular, pulmonary, gi and gu systems are negative, except as otherwise noted.      Objective    /86 (BP Location: Left arm, Patient Position: Sitting, Cuff Size: Adult Large)   Pulse 68   Wt 121.6 kg (268 lb)   SpO2 96%   BMI 37.64 kg/m    Body mass index is 37.64 kg/m .  Physical Exam   GENERAL: healthy, alert and no distress  NECK: no adenopathy, no asymmetry, masses, or scars and thyroid normal to palpation  RESP: lungs clear to auscultation - no rales, rhonchi or wheezes  CV: regular rate and rhythm, normal S1 S2, no S3 or S4, no murmur, click or rub, no peripheral edema and peripheral pulses strong  ABDOMEN: soft, nontender, no hepatosplenomegaly, no masses and bowel sounds normal  MS: no gross musculoskeletal defects noted, no edema  Diabetic foot exam: normal DP and PT pulses, no trophic changes or ulcerative lesions and normal sensory exam    Results for orders placed or performed in visit on 09/27/21 (from the past 24 hour(s))   Hemoglobin A1c   Result Value Ref Range    Hemoglobin A1C 6.1 (H) 0.0 - 5.6 %

## 2021-11-01 ENCOUNTER — TELEPHONE (OUTPATIENT)
Dept: FAMILY MEDICINE | Facility: CLINIC | Age: 46
End: 2021-11-01

## 2021-11-01 DIAGNOSIS — E11.9 TYPE 2 DIABETES MELLITUS WITHOUT COMPLICATION, WITHOUT LONG-TERM CURRENT USE OF INSULIN (H): ICD-10-CM

## 2021-11-01 NOTE — TELEPHONE ENCOUNTER
11-1-21  Reason for Call:   prescription    Detailed comments: pt called re:   semaglutide (OZEMPIC) 2 MG/1.5ML SOPN pen  Pt states Dr Sanford had him starting out taking .25 starter dose, and work his way to the .5MG pt took his last .5mg yesterday and now he is out.  Patient advise his insurance wont let him refill it as hes refilling to soon since the directions were written for .25, pharmacy wont let pt get refill either as directions state .25.  Pt's blood sugar is up about 40 points on average.      Phone Number Patient can be reached at: Cell number on file:    Telephone Information:   Mobile [phone]       Best Time: anytime    Can we leave a detailed message on this number? YES    Call taken on 11/1/2021 at 12:07 PM by Lyudmila Ontiveros

## 2021-12-28 ENCOUNTER — OFFICE VISIT (OUTPATIENT)
Dept: FAMILY MEDICINE | Facility: CLINIC | Age: 46
End: 2021-12-28
Payer: COMMERCIAL

## 2021-12-28 VITALS
BODY MASS INDEX: 37.52 KG/M2 | OXYGEN SATURATION: 97 % | WEIGHT: 267.1 LBS | HEART RATE: 65 BPM | SYSTOLIC BLOOD PRESSURE: 110 MMHG | TEMPERATURE: 97.5 F | DIASTOLIC BLOOD PRESSURE: 82 MMHG

## 2021-12-28 DIAGNOSIS — E55.9 VITAMIN D DEFICIENCY: ICD-10-CM

## 2021-12-28 DIAGNOSIS — E11.9 TYPE 2 DIABETES MELLITUS WITHOUT COMPLICATION, WITHOUT LONG-TERM CURRENT USE OF INSULIN (H): Primary | ICD-10-CM

## 2021-12-28 LAB
ALBUMIN SERPL-MCNC: 3.9 G/DL (ref 3.5–5)
ALP SERPL-CCNC: 81 U/L (ref 45–120)
ALT SERPL W P-5'-P-CCNC: 60 U/L (ref 0–45)
ANION GAP SERPL CALCULATED.3IONS-SCNC: 9 MMOL/L (ref 5–18)
AST SERPL W P-5'-P-CCNC: 36 U/L (ref 0–40)
BILIRUB SERPL-MCNC: 0.8 MG/DL (ref 0–1)
BUN SERPL-MCNC: 14 MG/DL (ref 8–22)
CALCIUM SERPL-MCNC: 9.9 MG/DL (ref 8.5–10.5)
CHLORIDE BLD-SCNC: 109 MMOL/L (ref 98–107)
CHOLEST SERPL-MCNC: 171 MG/DL
CO2 SERPL-SCNC: 22 MMOL/L (ref 22–31)
CREAT SERPL-MCNC: 1.2 MG/DL (ref 0.7–1.3)
FASTING STATUS PATIENT QL REPORTED: YES
GFR SERPL CREATININE-BSD FRML MDRD: 76 ML/MIN/1.73M2
GLUCOSE BLD-MCNC: 121 MG/DL (ref 70–125)
HBA1C MFR BLD: 6.3 % (ref 0–5.6)
HDLC SERPL-MCNC: 31 MG/DL
LDLC SERPL CALC-MCNC: 95 MG/DL
POTASSIUM BLD-SCNC: 4.3 MMOL/L (ref 3.5–5)
PROT SERPL-MCNC: 7.3 G/DL (ref 6–8)
SODIUM SERPL-SCNC: 140 MMOL/L (ref 136–145)
TRIGL SERPL-MCNC: 225 MG/DL

## 2021-12-28 PROCEDURE — 82306 VITAMIN D 25 HYDROXY: CPT | Performed by: FAMILY MEDICINE

## 2021-12-28 PROCEDURE — 80053 COMPREHEN METABOLIC PANEL: CPT | Performed by: FAMILY MEDICINE

## 2021-12-28 PROCEDURE — 99214 OFFICE O/P EST MOD 30 MIN: CPT | Performed by: FAMILY MEDICINE

## 2021-12-28 PROCEDURE — 83036 HEMOGLOBIN GLYCOSYLATED A1C: CPT | Performed by: FAMILY MEDICINE

## 2021-12-28 PROCEDURE — 36415 COLL VENOUS BLD VENIPUNCTURE: CPT | Performed by: FAMILY MEDICINE

## 2021-12-28 PROCEDURE — 80061 LIPID PANEL: CPT | Performed by: FAMILY MEDICINE

## 2021-12-29 LAB — DEPRECATED CALCIDIOL+CALCIFEROL SERPL-MC: 33 UG/L (ref 30–80)

## 2021-12-29 NOTE — PROGRESS NOTES
Assessment & Plan     Type 2 diabetes mellitus without complication, without long-term current use of insulin (H)    We will check some labs today including an A1c and a comprehensive panel to take a look at his kidney function as well as his diabetic control. We can get a lipid panel since he is fasting as well today and we can follow-up with him on the results when become available.    We talked generally about diabetic management with a good healthy diet and regular exercise and trying to lose a bit more weight and he knows that he needs to do that a bit better.      - Hemoglobin A1c; Future  - Comprehensive metabolic panel (BMP + Alb, Alk Phos, ALT, AST, Total. Bili, TP); Future  - Lipid panel reflex to direct LDL Fasting; Future  - Hemoglobin A1c  - Comprehensive metabolic panel (BMP + Alb, Alk Phos, ALT, AST, Total. Bili, TP)  - Lipid panel reflex to direct LDL Fasting    Vitamin D deficiency    This had a history of vitamin D deficiency in the past so we can check that today and see what his level is right now.    - Vitamin D Deficiency; Future  - Vitamin D Deficiency    Review of external notes as documented elsewhere in note  Review of the result(s) of each unique test - labs  Ordering of each unique test  Prescription drug management             No follow-ups on file.    Sandor Sanford MD  [facility]   Milan is a 46 year old who presents for the following health issues     HPI     Patient is here today for a diabetic check. He is on Ozempic at 0.5 mg every 7 days. We switched to this from the Metformin because the Metformin was upsetting his stomach. He states the stomach discomfort is quite a bit better, but he just does not feel the best and wonders if it is working or if he is getting some high blood sugar readings again which he does get sometimes. He is happy mostly with the Ozempic however.      Review of Systems   Constitutional, HEENT, cardiovascular,  pulmonary, gi and gu systems are negative, except as otherwise noted.      Objective    /82 (BP Location: Left arm, Patient Position: Sitting, Cuff Size: Adult Large)   Pulse 65   Temp 97.5  F (36.4  C) (Oral)   Wt 121.2 kg (267 lb 1.6 oz)   SpO2 97%   BMI 37.52 kg/m    Body mass index is 37.52 kg/m .  Physical Exam   GENERAL: healthy, alert and no distress  NECK: no adenopathy, no asymmetry, masses, or scars and thyroid normal to palpation  RESP: lungs clear to auscultation - no rales, rhonchi or wheezes  CV: regular rate and rhythm, normal S1 S2, no S3 or S4, no murmur, click or rub, no peripheral edema and peripheral pulses strong  ABDOMEN: soft, nontender, no hepatosplenomegaly, no masses and bowel sounds normal  MS: no gross musculoskeletal defects noted, no edema  Diabetic foot exam: normal DP and PT pulses, no trophic changes or ulcerative lesions and normal sensory exam    Results for orders placed or performed in visit on 12/28/21 (from the past 24 hour(s))   Hemoglobin A1c   Result Value Ref Range    Hemoglobin A1C 6.3 (H) 0.0 - 5.6 %   Comprehensive metabolic panel (BMP + Alb, Alk Phos, ALT, AST, Total. Bili, TP)   Result Value Ref Range    Sodium 140 136 - 145 mmol/L    Potassium 4.3 3.5 - 5.0 mmol/L    Chloride 109 (H) 98 - 107 mmol/L    Carbon Dioxide (CO2) 22 22 - 31 mmol/L    Anion Gap 9 5 - 18 mmol/L    Urea Nitrogen 14 8 - 22 mg/dL    Creatinine 1.20 0.70 - 1.30 mg/dL    Calcium 9.9 8.5 - 10.5 mg/dL    Glucose 121 70 - 125 mg/dL    Alkaline Phosphatase 81 45 - 120 U/L    AST 36 0 - 40 U/L    ALT 60 (H) 0 - 45 U/L    Protein Total 7.3 6.0 - 8.0 g/dL    Albumin 3.9 3.5 - 5.0 g/dL    Bilirubin Total 0.8 0.0 - 1.0 mg/dL    GFR Estimate 76 >60 mL/min/1.73m2   Lipid panel reflex to direct LDL Fasting   Result Value Ref Range    Cholesterol 171 <=199 mg/dL    Triglycerides 225 (H) <=149 mg/dL    Direct Measure HDL 31 (L) >=40 mg/dL    LDL Cholesterol Calculated 95 <=129 mg/dL    Patient Fasting  > 8hrs? Yes

## 2022-03-28 ENCOUNTER — OFFICE VISIT (OUTPATIENT)
Dept: FAMILY MEDICINE | Facility: CLINIC | Age: 47
End: 2022-03-28
Payer: COMMERCIAL

## 2022-03-28 VITALS
OXYGEN SATURATION: 96 % | DIASTOLIC BLOOD PRESSURE: 80 MMHG | SYSTOLIC BLOOD PRESSURE: 118 MMHG | BODY MASS INDEX: 37.64 KG/M2 | HEART RATE: 67 BPM | WEIGHT: 268 LBS

## 2022-03-28 DIAGNOSIS — E11.9 TYPE 2 DIABETES MELLITUS WITHOUT COMPLICATION, WITHOUT LONG-TERM CURRENT USE OF INSULIN (H): ICD-10-CM

## 2022-03-28 LAB
ALBUMIN SERPL-MCNC: 3.9 G/DL (ref 3.5–5)
ALP SERPL-CCNC: 92 U/L (ref 45–120)
ALT SERPL W P-5'-P-CCNC: 56 U/L (ref 0–45)
ANION GAP SERPL CALCULATED.3IONS-SCNC: 11 MMOL/L (ref 5–18)
AST SERPL W P-5'-P-CCNC: 35 U/L (ref 0–40)
BILIRUB SERPL-MCNC: 0.5 MG/DL (ref 0–1)
BUN SERPL-MCNC: 12 MG/DL (ref 8–22)
CALCIUM SERPL-MCNC: 10.1 MG/DL (ref 8.5–10.5)
CHLORIDE BLD-SCNC: 107 MMOL/L (ref 98–107)
CHOLEST SERPL-MCNC: 195 MG/DL
CO2 SERPL-SCNC: 23 MMOL/L (ref 22–31)
CREAT SERPL-MCNC: 1.13 MG/DL (ref 0.7–1.3)
DEPRECATED CALCIDIOL+CALCIFEROL SERPL-MC: 38 UG/L (ref 20–75)
FASTING STATUS PATIENT QL REPORTED: YES
GFR SERPL CREATININE-BSD FRML MDRD: 82 ML/MIN/1.73M2
GLUCOSE BLD-MCNC: 118 MG/DL (ref 70–125)
HBA1C MFR BLD: 6.5 % (ref 0–5.6)
HDLC SERPL-MCNC: 29 MG/DL
LDLC SERPL CALC-MCNC: 86 MG/DL
POTASSIUM BLD-SCNC: 4.3 MMOL/L (ref 3.5–5)
PROT SERPL-MCNC: 7.6 G/DL (ref 6–8)
SODIUM SERPL-SCNC: 141 MMOL/L (ref 136–145)
TRIGL SERPL-MCNC: 399 MG/DL

## 2022-03-28 PROCEDURE — 80061 LIPID PANEL: CPT | Performed by: FAMILY MEDICINE

## 2022-03-28 PROCEDURE — 80053 COMPREHEN METABOLIC PANEL: CPT | Performed by: FAMILY MEDICINE

## 2022-03-28 PROCEDURE — 36415 COLL VENOUS BLD VENIPUNCTURE: CPT | Performed by: FAMILY MEDICINE

## 2022-03-28 PROCEDURE — 82306 VITAMIN D 25 HYDROXY: CPT | Performed by: FAMILY MEDICINE

## 2022-03-28 PROCEDURE — 99214 OFFICE O/P EST MOD 30 MIN: CPT | Performed by: FAMILY MEDICINE

## 2022-03-28 PROCEDURE — 83036 HEMOGLOBIN GLYCOSYLATED A1C: CPT | Performed by: FAMILY MEDICINE

## 2022-03-28 NOTE — PROGRESS NOTES
"  Assessment & Plan     Type 2 diabetes mellitus without complication, without long-term current use of insulin (H)    He does feel like he has had less energy since going off of the Metformin, and his sugars have not done anything except even go up a little bit, so we will get him back on the Metformin at 500 mg twice a day.  He will stay on the Ozempic because he does not like the idea that potentially could help him lose weight and we will see if the combination works any better than just one of them alone.    - Hemoglobin A1c; Future  - Comprehensive metabolic panel; Future  - Lipid panel reflex to direct LDL Fasting; Future  - Vitamin D Deficiency; Future  - Hemoglobin A1c  - Comprehensive metabolic panel  - Lipid panel reflex to direct LDL Fasting  - Vitamin D Deficiency  - metFORMIN (GLUCOPHAGE) 500 MG tablet; Take 1 tablet (500 mg) by mouth 2 times daily (with meals)    Review of external notes as documented elsewhere in note  Review of the result(s) of each unique test - labs  Ordering of each unique test  Prescription drug management         BMI:   Estimated body mass index is 37.64 kg/m  as calculated from the following:    Height as of 6/7/21: 1.797 m (5' 10.75\").    Weight as of this encounter: 121.6 kg (268 lb).   Weight management plan: Discussed healthy diet and exercise guidelines        No follow-ups on file.    Sandor Sanford MD  [facility] is a 47 year old who presents for the following health issues     HPI     Patient is here today for diabetic check.  He was transitioned from Metformin to Ozempic a few months ago.  He does not mind doing the Ozempic and thinks that his blood sugars have been good, but since going off of the Metformin he just feels like he does not have as much energy as he used to and he wonders about getting back on it and if that would be a problem.  He has not really lost a lot of weight on the Metformin either which is " frustrating for him.      Review of Systems   Constitutional, HEENT, cardiovascular, pulmonary, gi and gu systems are negative, except as otherwise noted.      Objective    /80 (BP Location: Left arm, Patient Position: Sitting, Cuff Size: Adult Large)   Pulse 67   Wt 121.6 kg (268 lb)   SpO2 96%   BMI 37.64 kg/m    Body mass index is 37.64 kg/m .  Physical Exam   GENERAL: healthy, alert and no distress  NECK: no adenopathy, no asymmetry, masses, or scars and thyroid normal to palpation  RESP: lungs clear to auscultation - no rales, rhonchi or wheezes  CV: regular rate and rhythm, normal S1 S2, no S3 or S4, no murmur, click or rub, no peripheral edema and peripheral pulses strong  ABDOMEN: soft, nontender, no hepatosplenomegaly, no masses and bowel sounds normal  MS: no gross musculoskeletal defects noted, no edema  Diabetic foot exam: normal DP and PT pulses, no trophic changes or ulcerative lesions and normal sensory exam    Results for orders placed or performed in visit on 03/28/22 (from the past 24 hour(s))   Hemoglobin A1c   Result Value Ref Range    Hemoglobin A1C 6.5 (H) 0.0 - 5.6 %

## 2022-06-09 ENCOUNTER — OFFICE VISIT (OUTPATIENT)
Dept: FAMILY MEDICINE | Facility: CLINIC | Age: 47
End: 2022-06-09
Payer: COMMERCIAL

## 2022-06-09 VITALS
BODY MASS INDEX: 37.69 KG/M2 | WEIGHT: 269.19 LBS | OXYGEN SATURATION: 97 % | DIASTOLIC BLOOD PRESSURE: 86 MMHG | SYSTOLIC BLOOD PRESSURE: 122 MMHG | HEART RATE: 69 BPM | HEIGHT: 71 IN

## 2022-06-09 DIAGNOSIS — R53.82 CHRONIC FATIGUE: ICD-10-CM

## 2022-06-09 DIAGNOSIS — E11.9 TYPE 2 DIABETES MELLITUS WITHOUT COMPLICATION, WITHOUT LONG-TERM CURRENT USE OF INSULIN (H): Primary | ICD-10-CM

## 2022-06-09 LAB
BASOPHILS # BLD AUTO: 0 10E3/UL (ref 0–0.2)
BASOPHILS NFR BLD AUTO: 1 %
C REACTIVE PROTEIN LHE: <0.1 MG/DL (ref 0–0.8)
CHOLEST SERPL-MCNC: 193 MG/DL
EOSINOPHIL # BLD AUTO: 0.2 10E3/UL (ref 0–0.7)
EOSINOPHIL NFR BLD AUTO: 4 %
ERYTHROCYTE [DISTWIDTH] IN BLOOD BY AUTOMATED COUNT: 13.3 % (ref 10–15)
ERYTHROCYTE [SEDIMENTATION RATE] IN BLOOD BY WESTERGREN METHOD: 3 MM/HR (ref 0–15)
FASTING STATUS PATIENT QL REPORTED: YES
FERRITIN SERPL-MCNC: 292 NG/ML (ref 27–300)
HBA1C MFR BLD: 6 % (ref 0–5.6)
HCT VFR BLD AUTO: 47.6 % (ref 40–53)
HDLC SERPL-MCNC: 31 MG/DL
HGB BLD-MCNC: 16.5 G/DL (ref 13.3–17.7)
IMM GRANULOCYTES # BLD: 0.1 10E3/UL
IMM GRANULOCYTES NFR BLD: 2 %
LDLC SERPL CALC-MCNC: ABNORMAL MG/DL
LYMPHOCYTES # BLD AUTO: 3.2 10E3/UL (ref 0.8–5.3)
LYMPHOCYTES NFR BLD AUTO: 49 %
MCH RBC QN AUTO: 29.9 PG (ref 26.5–33)
MCHC RBC AUTO-ENTMCNC: 34.7 G/DL (ref 31.5–36.5)
MCV RBC AUTO: 86 FL (ref 78–100)
MONOCYTES # BLD AUTO: 0.6 10E3/UL (ref 0–1.3)
MONOCYTES NFR BLD AUTO: 9 %
NEUTROPHILS # BLD AUTO: 2.3 10E3/UL (ref 1.6–8.3)
NEUTROPHILS NFR BLD AUTO: 36 %
PLATELET # BLD AUTO: 280 10E3/UL (ref 150–450)
RBC # BLD AUTO: 5.52 10E6/UL (ref 4.4–5.9)
TRIGL SERPL-MCNC: 573 MG/DL
TSH SERPL DL<=0.005 MIU/L-ACNC: 3.04 UIU/ML (ref 0.3–5)
WBC # BLD AUTO: 6.4 10E3/UL (ref 4–11)

## 2022-06-09 PROCEDURE — 86140 C-REACTIVE PROTEIN: CPT | Performed by: FAMILY MEDICINE

## 2022-06-09 PROCEDURE — 36415 COLL VENOUS BLD VENIPUNCTURE: CPT | Performed by: FAMILY MEDICINE

## 2022-06-09 PROCEDURE — 82728 ASSAY OF FERRITIN: CPT | Performed by: FAMILY MEDICINE

## 2022-06-09 PROCEDURE — 84270 ASSAY OF SEX HORMONE GLOBUL: CPT | Performed by: FAMILY MEDICINE

## 2022-06-09 PROCEDURE — 84403 ASSAY OF TOTAL TESTOSTERONE: CPT | Performed by: FAMILY MEDICINE

## 2022-06-09 PROCEDURE — 80061 LIPID PANEL: CPT | Mod: 59 | Performed by: FAMILY MEDICINE

## 2022-06-09 PROCEDURE — 80050 GENERAL HEALTH PANEL: CPT | Performed by: FAMILY MEDICINE

## 2022-06-09 PROCEDURE — 99214 OFFICE O/P EST MOD 30 MIN: CPT | Performed by: FAMILY MEDICINE

## 2022-06-09 PROCEDURE — 83036 HEMOGLOBIN GLYCOSYLATED A1C: CPT | Performed by: FAMILY MEDICINE

## 2022-06-09 PROCEDURE — 83721 ASSAY OF BLOOD LIPOPROTEIN: CPT | Performed by: FAMILY MEDICINE

## 2022-06-09 PROCEDURE — 85652 RBC SED RATE AUTOMATED: CPT | Performed by: FAMILY MEDICINE

## 2022-06-09 NOTE — PROGRESS NOTES
Assessment & Plan     Type 2 diabetes mellitus without complication, without long-term current use of insulin (H)    His A1c is excellent at 6.0%.  With my departure he would prefer to see an endocrinologist going forward so I put in a referral to our endocrinology department and they will give him a call and set something up for future care.  Other labs will be addressed when they come back.    - Adult Endocrinology  Referral; Future  - Hemoglobin A1c; Future  - Comprehensive metabolic panel; Future  - Lipid panel reflex to direct LDL Fasting; Future  - Lipid panel reflex to direct LDL Fasting  - Comprehensive metabolic panel  - Hemoglobin A1c    Chronic fatigue    He wanted to recheck a few of the labs that we did last year for chronic fatigue that still is a bit of a problem for him, so we did that as well and we can follow-up on the lab results when they become available.      - CBC with platelets and differential; Future  - Ferritin; Future  - ESR: Erythrocyte sedimentation rate; Future  - CRP, inflammation; Future  - TSH; Future  - Testosterone Bioavailable; Future  - Testosterone Bioavailable  - TSH  - CRP, inflammation  - ESR: Erythrocyte sedimentation rate  - Ferritin  - CBC with platelets and differential    Review of the result(s) of each unique test - labs  Ordering of each unique test  Prescription drug management             No follow-ups on file.    Sandor Sanford MD  [facility]   Milan is a 47 year old who presents for the following health issues     History of Present Illness       Diabetes:   He presents for follow up of diabetes.  He is checking home blood glucose one time daily. He checks blood glucose at bedtime.  Blood glucose is never over 200 and never under 70. He is aware of hypoglycemia symptoms including other. He is concerned about other.  He is having blurry vision and none of these symptoms. The patient has not had a diabetic eye  "exam in the last 12 months.         He eats 2-3 servings of fruits and vegetables daily.He consumes 0 sweetened beverage(s) daily.He exercises with enough effort to increase his heart rate 20 to 29 minutes per day.  He exercises with enough effort to increase his heart rate 4 days per week.   He is taking medications regularly.             Review of Systems   Constitutional, HEENT, cardiovascular, pulmonary, gi and gu systems are negative, except as otherwise noted.      Objective    /86 (BP Location: Left arm, Patient Position: Sitting, Cuff Size: Adult Large)   Pulse 69   Ht 1.797 m (5' 10.75\")   Wt 122.1 kg (269 lb 3 oz)   SpO2 97%   BMI 37.81 kg/m    Body mass index is 37.81 kg/m .  Physical Exam   GENERAL: healthy, alert and no distress  NECK: no adenopathy, no asymmetry, masses, or scars and thyroid normal to palpation  RESP: lungs clear to auscultation - no rales, rhonchi or wheezes  CV: regular rate and rhythm, normal S1 S2, no S3 or S4, no murmur, click or rub, no peripheral edema and peripheral pulses strong  ABDOMEN: soft, nontender, no hepatosplenomegaly, no masses and bowel sounds normal  MS: no gross musculoskeletal defects noted, no edema  Diabetic foot exam: normal DP and PT pulses, no trophic changes or ulcerative lesions and normal sensory exam    Results for orders placed or performed in visit on 06/09/22 (from the past 24 hour(s))   Testosterone Bioavailable    Narrative    The following orders were created for panel order Testosterone Bioavailable.  Procedure                               Abnormality         Status                     ---------                               -----------         ------                     Sex Hormone Binding Glob...[[id number]]                      In process                 Testosterone Free and Total[[id number]]                      In process                   Please view results for these tests on the individual orders.   CBC with platelets and " differential    Narrative    The following orders were created for panel order CBC with platelets and differential.  Procedure                               Abnormality         Status                     ---------                               -----------         ------                     CBC with platelets and d...[[id number]]                      Final result                 Please view results for these tests on the individual orders.   Hemoglobin A1c   Result Value Ref Range    Hemoglobin A1C 6.0 (H) 0.0 - 5.6 %   CBC with platelets and differential   Result Value Ref Range    WBC Count 6.4 4.0 - 11.0 10e3/uL    RBC Count 5.52 4.40 - 5.90 10e6/uL    Hemoglobin 16.5 13.3 - 17.7 g/dL    Hematocrit 47.6 40.0 - 53.0 %    MCV 86 78 - 100 fL    MCH 29.9 26.5 - 33.0 pg    MCHC 34.7 31.5 - 36.5 g/dL    RDW 13.3 10.0 - 15.0 %    Platelet Count 280 150 - 450 10e3/uL    % Neutrophils 36 %    % Lymphocytes 49 %    % Monocytes 9 %    % Eosinophils 4 %    % Basophils 1 %    % Immature Granulocytes 2 %    Absolute Neutrophils 2.3 1.6 - 8.3 10e3/uL    Absolute Lymphocytes 3.2 0.8 - 5.3 10e3/uL    Absolute Monocytes 0.6 0.0 - 1.3 10e3/uL    Absolute Eosinophils 0.2 0.0 - 0.7 10e3/uL    Absolute Basophils 0.0 0.0 - 0.2 10e3/uL    Absolute Immature Granulocytes 0.1 <=0.4 10e3/uL

## 2022-06-10 LAB
ALBUMIN SERPL-MCNC: 4.3 G/DL (ref 3.5–5)
ALP SERPL-CCNC: 91 U/L (ref 45–120)
ALT SERPL W P-5'-P-CCNC: 51 U/L (ref 0–45)
ANION GAP SERPL CALCULATED.3IONS-SCNC: 13 MMOL/L (ref 5–18)
AST SERPL W P-5'-P-CCNC: 44 U/L (ref 0–40)
BILIRUB SERPL-MCNC: 0.4 MG/DL (ref 0–1)
BUN SERPL-MCNC: 19 MG/DL (ref 8–22)
CALCIUM SERPL-MCNC: 10.1 MG/DL (ref 8.5–10.5)
CHLORIDE BLD-SCNC: 110 MMOL/L (ref 98–107)
CO2 SERPL-SCNC: 18 MMOL/L (ref 22–31)
CREAT SERPL-MCNC: 1 MG/DL (ref 0.7–1.3)
GFR SERPL CREATININE-BSD FRML MDRD: >90 ML/MIN/1.73M2
GLUCOSE BLD-MCNC: 110 MG/DL (ref 70–125)
LDLC SERPL CALC-MCNC: 122 MG/DL
POTASSIUM BLD-SCNC: 4.1 MMOL/L (ref 3.5–5)
PROT SERPL-MCNC: 7.9 G/DL (ref 6–8)
SHBG SERPL-SCNC: 64 NMOL/L (ref 11–80)
SODIUM SERPL-SCNC: 141 MMOL/L (ref 136–145)

## 2022-06-13 LAB
TESTOST FREE SERPL-MCNC: 6.69 NG/DL
TESTOST SERPL-MCNC: 504 NG/DL (ref 240–950)

## 2022-06-14 DIAGNOSIS — E11.9 TYPE 2 DIABETES MELLITUS WITHOUT COMPLICATION, WITHOUT LONG-TERM CURRENT USE OF INSULIN (H): ICD-10-CM

## 2022-06-14 RX ORDER — SEMAGLUTIDE 1.34 MG/ML
INJECTION, SOLUTION SUBCUTANEOUS
Qty: 3 ML | Refills: 3 | Status: SHIPPED | OUTPATIENT
Start: 2022-06-14 | End: 2022-11-09

## 2022-06-15 ENCOUNTER — TELEPHONE (OUTPATIENT)
Dept: FAMILY MEDICINE | Facility: CLINIC | Age: 47
End: 2022-06-15
Payer: COMMERCIAL

## 2022-06-15 NOTE — TELEPHONE ENCOUNTER
Reason for Call:  Medication or medication refill:    Do you use a [facility]?  Name of the pharmacy and phone number for the current request:  [id number] [place] [id number] [facility]  AT [facility]  [phone]    Name of the medication requested: metFORMIN (GLUCOPHAGE) 500 MG tablet    Other request: none    Can we leave a detailed message on this number? Not Applicable    Phone number patient can be reached at: Home number on file [phone] (home)    Best Time: any    Call taken on 6/15/2022 at 1:35 PM by Haroldo Willis

## 2022-06-15 NOTE — TELEPHONE ENCOUNTER
"Last Written Prescription Date:  11/1/21  Last Fill Quantity: 3,  # refills: 3   Last office visit provider:  6/9/22     Requested Prescriptions   Pending Prescriptions Disp Refills     OZEMPIC (0.25 OR 0.5 MG/DOSE) 2 MG/1.5ML SOPN pen [Pharmacy Med Name: OZEMPIC 0.25 OR 0.5MG/DOS 1X2MG PEN] 3 mL 3     Sig: INJECT 0.5MG UNDER THE SKIN ONCE WEEKLY       GLP-1 Agonists Protocol Passed - 6/14/2022  3:10 AM        Passed - HgbA1C in past 3 or 6 months     If HgbA1C is 8 or greater, it needs to be on file within the past 3 months.  If less than 8, must be on file within the past 6 months.     Recent Labs   Lab Test 06/09/22  0830   A1C 6.0*             Passed - Medication is active on med list        Passed - Patient is age 18 or older        Passed - Normal serum creatinine on file in past 12 months     Recent Labs   Lab Test 06/09/22  0830   CR 1.00       Ok to refill medication if creatinine is low          Passed - Recent (6 mo) or future (30 days) visit within the authorizing provider's specialty     Patient had office visit in the last 6 months or has a visit in the next 30 days with authorizing provider.  See \"Patient Info\" tab in inbasket, or \"Choose Columns\" in Meds & Orders section of the refill encounter.                 Jeanine Huertas RN 06/14/22 8:41 PM  "

## 2022-06-17 DIAGNOSIS — E66.01 MORBID OBESITY (H): Primary | ICD-10-CM

## 2022-06-17 NOTE — TELEPHONE ENCOUNTER
Medication was filled for a year supply. Left voicemail for patient to check with his pharmacy to have this filled.  Marleni Melendez LPN

## 2022-06-18 NOTE — TELEPHONE ENCOUNTER
Routing refill request to provider for review/approval because:  Drug not on the G refill protocol   Drug not active on patient's medication list    Last Written Prescription Date:    Last Fill Quantity: ,  # refills:   Last office visit provider:  22     Requested Prescriptions   Pending Prescriptions Disp Refills     vitamin D2 (ERGOCALCIFEROL) 77748 units (1250 mcg) capsule [Pharmacy Med Name: VITAMIN D2 50,000IU (ERGO) CAP RX] 12 capsule      Sig: TAKE 1 CAPSULE BY MOUTH 1 TIME A WEEK FOR 12 DOSES       There is no refill protocol information for this order         Disp Refills Start End NELI   ergocalciferol (ERGOCALCIFEROL) 1,250 mcg (50,000 unit) capsule 12 capsule 2 2021 --   Sig - Route: Take 1 capsule (50,000 Units total) by mouth once a week for 12 doses. - Oral   Sent to pharmacy as: ergocalciferol (vitamin D2) 1,250 mcg (50,000 unit) capsule (ERGOCALCIFEROL)   E-Prescribing Status: Receipt confirmed by pharmacy (2021 12:01 PM CDT)       ergocalciferol (ERGOCALCIFEROL) 1,250 mcg (50,000 unit) capsule [[id number]]    Electronically signed by: Sandor Sanford MD on 21 Status:    Ordering user: Sandor Sanford MD 21 Authorized by: Sandor Sanford MD   Frequency: Weekly 21 - 12  occurrences   Diagnoses  Type 2 diabetes mellitus with hyperglycemia, without long-term current use of insulin (H) [E11.65]           Jeanine Huertas RN 22 12:30 PM

## 2022-06-20 RX ORDER — ERGOCALCIFEROL 1.25 MG/1
CAPSULE, LIQUID FILLED ORAL
Qty: 12 CAPSULE | Refills: 0 | Status: SHIPPED | OUTPATIENT
Start: 2022-06-20

## 2022-08-21 ENCOUNTER — HEALTH MAINTENANCE LETTER (OUTPATIENT)
Age: 47
End: 2022-08-21

## 2022-09-14 NOTE — PROGRESS NOTES
Outcome for 09/14/22 9:41 AM: [employer] message sent  Tamera Cerrato, MATTHEW  Outcome for 09/16/22 2:27 PM: Per patient, will send BG readings via [employer]  Rebecca Randall, MATTHEW  Outcome for 09/19/22 9:47 AM: Left Voicemail   Tamera Cerrato, VF  Outcome for 09/19/22 10:13 AM: Glucose Readings sent via [employer]  Tamera Cerrato, VF

## 2022-09-17 ASSESSMENT — ENCOUNTER SYMPTOMS
WEIGHT GAIN: 0
HALLUCINATIONS: 0
FEVER: 0
EYE WATERING: 0
WEIGHT LOSS: 0
MUSCLE WEAKNESS: 1
DOUBLE VISION: 0
INCREASED ENERGY: 1
SNORES LOUDLY: 1
MYALGIAS: 1
HEMOPTYSIS: 0
POSTURAL DYSPNEA: 0
CONSTIPATION: 1
BACK PAIN: 1
ARTHRALGIAS: 1
SHORTNESS OF BREATH: 0
DECREASED APPETITE: 0
STIFFNESS: 1
JOINT SWELLING: 1
FATIGUE: 1
COUGH: 0
ABDOMINAL PAIN: 0
NAUSEA: 0
SPUTUM PRODUCTION: 0
CHILLS: 0
RECTAL PAIN: 0
WHEEZING: 0
BLOATING: 0
NIGHT SWEATS: 0
POLYPHAGIA: 0
BOWEL INCONTINENCE: 0
BLOOD IN STOOL: 0
ALTERED TEMPERATURE REGULATION: 0
COUGH DISTURBING SLEEP: 0
EYE PAIN: 0
NECK PAIN: 1
HEARTBURN: 0
POLYDIPSIA: 0
JAUNDICE: 0
MUSCLE CRAMPS: 1
EYE IRRITATION: 0
VOMITING: 0
DIARRHEA: 1
EYE REDNESS: 0
DYSPNEA ON EXERTION: 0

## 2022-09-20 ENCOUNTER — VIRTUAL VISIT (OUTPATIENT)
Dept: ENDOCRINOLOGY | Facility: CLINIC | Age: 47
End: 2022-09-20
Attending: FAMILY MEDICINE
Payer: COMMERCIAL

## 2022-09-20 DIAGNOSIS — E11.9 TYPE 2 DIABETES MELLITUS WITHOUT COMPLICATION, WITHOUT LONG-TERM CURRENT USE OF INSULIN (H): ICD-10-CM

## 2022-09-20 DIAGNOSIS — E78.2 MIXED HYPERLIPIDEMIA: Primary | ICD-10-CM

## 2022-09-20 PROCEDURE — 99204 OFFICE O/P NEW MOD 45 MIN: CPT | Mod: 95 | Performed by: INTERNAL MEDICINE

## 2022-09-20 RX ORDER — METFORMIN HCL 500 MG
TABLET, EXTENDED RELEASE 24 HR ORAL
Qty: 45 TABLET | Refills: 11 | Status: SHIPPED | OUTPATIENT
Start: 2022-09-20 | End: 2022-11-09

## 2022-09-20 NOTE — NURSING NOTE
Chief Complaint   Patient presents with     Follow Up     Milan, Is here for a follow up appt     Rigoberto CASTRO

## 2022-09-20 NOTE — PROGRESS NOTES
"THIS IS A VIDEO VISIT:    Phone call visit/virtual visit encounter:    Name of patient: Milan Carrillo    Date of encounter: 9/20/2022    Time of start of video visit: 2:30    Video started: 2:40    Video ended: 2:53    Provider location: working from home/ [facility]    Patient location: patients home.    Mode of transmission: Project Manager video/ [employer]    Verbal consent: obtained before starting visit. Pt is agreeable.      The patient has been notified of following:      \"This VIDEO visit will be conducted via a call between you and your physician/provider. We have found that certain health care needs can be provided without the need for a physical exam.  This service lets us provide the care you need with a short phone conversation.  If a prescription is necessary we can send it directly to your pharmacy.  If lab work is needed we can place an order for that and you can then stop by our lab to have the test done at a later time.     With new updates with corona virus patient might be billed as clinic visit.     If during the course of the call the physician/provider feels a telephone visit is not appropriate, you will not be charged for this service.\"      Past medical history, social history, family history, allergy and medications were reviewed and updated as appropriate.  Reviewed pertinent labs, notes, imaging studies personally.      ENDOCRINOLOGY CLINIC NOTE:  Name: Milan Carrillo  Seen in consultation with Sandor Sanford for Diabetes.  HPI:  Milan Carrillo is a 47 year old male who presents for the evaluation/management of Diabetes.   has a past medical history of Arthritis, Hyperlipidemia, Hypertension, Kidney stone, and Sleep apnea.      1. Type 2 DM:  Orginally diagnosed at the age of: 46.  Current Regimen:   yes:     Diabetes Medication(s)     Biguanides       metFORMIN (GLUCOPHAGE) 500 MG tablet    Take 1 tablet (500 mg) by mouth 2 times daily (with meals)    Incretin Mimetic Agents (GLP-1 Receptor " Agonists)       OZEMPIC, 0.25 OR 0.5 MG/DOSE, 2 MG/1.5ML SOPN pen    INJECT 0.5MG UNDER THE SKIN ONCE WEEKLY      Not able to tolerate higher dose of regular metformin 2/2 to GI s/e.  Takes regular metformin.    BS checks: 1-2 times/day  Average Meter Download: see [employer] message  Exercise: limited. Feels fatigued.  Last A1c:   Symptoms of hypoglycemia (low blood sugar):  Gets symptoms of hypoglycemia.  Episodes of hypoglycemia:    DM Complications:   Complications:   Diabetes Complications  Description / Detail    Diabetic Retinopathy  No   CAD / PAD  No   Neuropathy  + Dm neuropathy   Nephropathy / Microalbuminuria  No  No results found for: UMALCR      Gastroparesis  No   Hypoglycemia [place]  No       2. Hypertension:    Not on medications.  3. Hyperlipidemia: Not on medications. Noted to have high TG.    PMH/PSH:  Past Medical History:   Diagnosis Date     Arthritis      Hyperlipidemia      Hypertension      Kidney stone      Sleep apnea      Past Surgical History:   Procedure Laterality Date     ARTHROSCOPY SHOULDER ROTATOR CUFF REPAIR Right      ENT SURGERY       EYE SURGERY       HERNIA REPAIR       HERNIORRHAPHY UMBILICAL N/A 5/29/2020    Procedure: OPEN UMBILICAL HERNIA REPAIR WITH  MESH;  Surgeon: Curt Ramachandran MD;  Location:  OR     ORTHOPEDIC SURGERY      R shoulder     RECESSION RESECTION (REPAIR STRABISMUS)  10/1/2012    Procedure: RECESSION RESECTION (REPAIR STRABISMUS);  RIGHT STRABISMUS REPAIR;  Surgeon: Joanie Gurrola MD;  Location: [facility]     RHINOPLASTY       STRABISMUS SURGERY       Family Hx:  Family History   Problem Relation Age of Onset     Glaucoma No family hx of      Macular Degeneration No family hx of      Pulmonary fibrosis Mother      No Known Problems Father      Hypertension Maternal Grandmother      Diabetes No family hx of      Cancer No family hx of        Diabetes:    Social Hx:  Social History     Socioeconomic History     Marital status:      Spouse  name: Not on file     Number of children: Not on file     Years of education: Not on file     Highest education level: Not on file   Occupational History     Not on file   Tobacco Use     Smoking status: Never Smoker     Smokeless tobacco: Never Used   Substance and Sexual Activity     Alcohol use: Not Currently     Comment: Alcoholic Drinks/day: 2/month     Drug use: Never     Sexual activity: Not on file   Other Topics Concern     Parent/sibling w/ CABG, MI or angioplasty before 65F 55M? Not Asked   Social History Narrative     Not on file     Social Determinants of Health     Financial Resource Strain: Not on file   Food Insecurity: Not on file   Transportation Needs: Not on file   Physical Activity: Not on file   Stress: Not on file   Social Connections: Not on file   Intimate Partner Violence: Not on file   Housing Stability: Not on file          MEDICATIONS:  has a current medication list which includes the following prescription(s): cinnamon, contour next test, metformin, ozempic (0.25 or 0.5 mg/dose), red yeast rice extract, sildenafil, and vitamin d2.    ROS     ROS: 10 point ROS neg other than the symptoms noted above in the HPI.    Physical Exam   VS: There were no vitals taken for this visit.  GENERAL: healthy, alert and no distress  EYES: Eyes grossly normal to inspection, conjunctivae and sclerae normal  ENT: no nose swelling, nasal discharge.  Thyroid: no apparent thyroid nodules  RESP: no audible wheeze, cough, or visible cyanosis.  No visible retractions or increased work of breathing.  Able to speak fully in complete sentences.  ABDO: not evaluated.  EXTREMITIES: no hand tremors.  NEURO: Cranial nerves grossly intact, mentation intact and speech normal  SKIN: No apparent skin lesions, rash or edema seen   PSYCH: mentation appears normal, affect normal/bright, judgement and insight intact, normal speech and appearance well-groomed    LABS:  A1c:  Lab Results   Component Value Date    A1C 6.0  06/09/2022    A1C 6.5 03/28/2022    A1C 6.3 12/28/2021    A1C 6.1 09/27/2021    A1C 7.3 07/26/2021    A1C 5.4 05/30/2017    A1C 5.7 02/24/2016    A1C 5.2 06/04/2010       Creatinine:  Creatinine   Date Value Ref Range Status   06/09/2022 1.00 0.70 - 1.30 mg/dL Final   04/15/2020 1.08 0.66 - 1.25 mg/dL Final   ]    Urine Micro:  No results found for: MICROL  No results found for: MICROALBUMIN      LFTs/Lipids:  Lab Results   Component Value Date    CHOL 193 06/09/2022    CHOL 206.7 05/30/2017     Lab Results   Component Value Date    HDL 31 06/09/2022    HDL 35.1 05/30/2017     Lab Results   Component Value Date    LDL  06/09/2022      Comment:      Cannot estimate LDL when triglyceride exceeds 400 mg/dL     06/09/2022     05/30/2017     Lab Results   Component Value Date    TRIG 573 06/09/2022    TRIG 237.5 05/30/2017     Lab Results   Component Value Date    CHOLHDLRATIO 5.9 05/30/2017       TFTs:  TSH   Date Value Ref Range Status   06/09/2022 3.04 0.30 - 5.00 uIU/mL Final   09/18/2015 2.52 0.40 - 4.00 mU/L Final       All pertinent notes, labs, and images personally reviewed by me.     Glucometer/ insulin pump (if applicable)/ CGM data (if applicable) downloaded, Personally reviewed and interpreted.  All Blood sugar data reviewed personally and discussed with pt.  See nursing note from 9/20/2022 for details of BG/CGM log.      A/P  Mr.David JODY Carrillo is a 47 year old here for the evaluation/management of diabetes:    1. DM2 - Under Good control. A1c 6.0%.   No known complications from DM.  In general BG in rage.  He is interested in medication that will help with wt loss as well.  Plan:  Discussed diagnosis, pathophysiology, management and treatment options of condition with pt.  I also discussed importance of strict blood sugar control to prevent complications associated with uncontrolled diabetes.  Recommend to switch to metformin extended release and see if it is better tolerated than regular  metformin.  Plan to stop regular metformin and switch to extended release metformin.  Metformin XR--take 1000 mg in the morning and 500 mg at night.  If tolerated can increase dose to 1000 mg twice a day  Continue Ozempic 0.5 mg/week for now. Can consider increasing dose in future--this will also help with weight loss..  Recommend repeat labs  Follow-up in 3 to 6 months     2. Hypertension - Under Good control.  Not on medication.    3. Hyperlipidemia - Under Poor control.   Not on medication.  Noted to have high triglyceride levels.  Plan to repeat fasting lipid panel.  Based on then consider starting fibrate.      4. Prevention:  Opthalmology-recommend annual  ASA-not indicated secondary to age  Smoking-no    Foot exam: 6/2022.    Most Recent Immunizations   Administered Date(s) Administered     COVID-19,PF,Pfizer (12+ Yrs) 01/13/2021     Influenza (IIV3) PF 09/27/2012     Influenza (intradermal) 10/24/2012     Influenza Vaccine IM > 6 months Valent IIV4 (Alfuria,Fluzone) 09/13/2021     TDAP Vaccine (Adacel) 02/07/2019     TDAP Vaccine (Boostrix) 09/27/2012     Tdap (Adacel,Boostrix) 02/07/2019         Recommend checking blood sugars before meals and at bedtime.    If Blood glucose are low more often-> 2-3 times/week- give us a call.  The patient is advised to Make better food choices: reduce carbs, Reduce portion size, weight loss and exercise 3-4 times a week.  Discussed hypoglycemia signs and symptoms as well as management in detail.    There is some variability among people, most will usually develop symptoms suggestive of hypoglycemia when blood glucose levels are lowered to the mid 60's. The first set of symptoms are called adrenergic. Patients may experience any of the following nervousness, sweating, intense hunger, trembling, weakness, palpitations, and difficulty speaking.   The acute management of hypoglycemia involves the rapid delivery of a source of easily absorbed sugar. Regular soda, juice,  lifesavers, table sugar, are good options. 15 grams of glucose is the dose that is given, followed by an assessment of symptoms and a blood glucose check if possible. If after 10 minutes there is no improvement, another 10-15 grams should be given. This can be repeated up to three times. The equivalency of 10-15 grams of glucose (approximate servings) are: Four lifesavers, 4 teaspoons of sugar, or 1/2 can of regular soda or juice.   Noted to have high triglyceride levels.  Plan to repeat fasting lipid panel  Based on that consider starting medication all questions were answered.  The patient indicates understanding of the above issues and agrees with the plan set forth.     Follow-up:  3-6 months    Lola Babcock M.D  Endocrinology  [facility]/[place]  CC: Sandor Sanford    Disclaimer: This note consists of symbols derived from keyboarding, dictation and/or voice recognition software. As a result, there may be errors in the script that have gone undetected. Please consider this when interpreting information found in this chart.    Addendum to above note and clinic visit:    Labs reviewed.    See result note/telephone encounter.    9/19- 101  9/18- 105  9/15- 110  9/13- 88  9/11- 141  9/10- 97  9/9- 113  9/8- 109      Answers for HPI/ROS submitted by the patient on 9/17/2022  General Symptoms: Yes  Skin Symptoms: No  HENT Symptoms: No  EYE SYMPTOMS: Yes  HEART SYMPTOMS: No  LUNG SYMPTOMS: Yes  INTESTINAL SYMPTOMS: Yes  URINARY SYMPTOMS: No  REPRODUCTIVE SYMPTOMS: Yes  SKELETAL SYMPTOMS: Yes  BLOOD SYMPTOMS: No  NERVOUS SYSTEM SYMPTOMS: No  MENTAL HEALTH SYMPTOMS: No  Fever: No  Loss of appetite: No  Weight loss: No  Weight gain: No  Fatigue: Yes  Night sweats: No  Chills: No  Increased stress: No  Excessive hunger: No  Excessive thirst: No  Feeling hot or cold when others believe the temperature is normal: No  Loss of height: No  Post-operative complications: No  Surgical site pain: No  Hallucinations:  No  Change in or Loss of Energy: Yes  Hyperactivity: No  Confusion: No  Eye pain: No  Vision loss: Yes  Dry eyes: No  Watery eyes: No  Eye bulging: No  Double vision: No  Flashing of lights: No  Spots: No  Floaters: No  Redness: No  Crossed eyes: No  Tunnel Vision: No  Yellowing of eyes: No  Eye irritation: No  Cough: No  Sputum or phlegm: No  Coughing up blood: No  Difficulty breating or shortness of breath: No  Snoring: Yes  Wheezing: No  Difficulty breathing on exertion: No  Nighttime Cough: No  Difficulty breathing when lying flat: No  Heart burn or indigestion: No  Nausea: No  Vomiting: No  Abdominal pain: No  Bloating: No  Constipation: Yes  Diarrhea: Yes  Blood in stool: No  Black stools: No  Rectal or Anal pain: No  Fecal incontinence: No  Yellowing of skin or eyes: No  Vomit with blood: No  Change in stools: No  Scrotal pain or swelling: No  Erectile dysfunction: No  Penile discharge: No  Genital ulcers: No  Reduced libido: Yes  Back pain: Yes  Muscle aches: Yes  Neck pain: Yes  Swollen joints: Yes  Joint pain: Yes  Bone pain: Yes  Muscle cramps: Yes  Muscle weakness: Yes  Joint stiffness: Yes  Bone fracture: No

## 2022-09-20 NOTE — PATIENT INSTRUCTIONS
-[facility]  Dr Babcock, Endocrinology Department    [facility]   [id number]  [place] [address]  Appointment [phone]  Fax: [phone]  [place]: Monday - Thursday      To provide the best diabetic care, please bring your blood glucose meter to each and every visit with your Endocrinologist.  Your blood glucose meter/insulin pump will be downloaded at every appointment.    Please arrive 15 minutes before your scheduled appointment.  This will allow for your blood glucose meter/insulin pump to be downloaded.  If you are wearing DEXCOM please bring  or sharing code so that it can be downloaded.  If you are using freestyle edya personal sensors please bring the reader.  If you are using TANDEM insulin pump please have your username and password to get info from [employer] website.    Plan to stop regular metformin and switch to extended release metformin.  Metformin XR--take 1000 mg in the morning and 500 mg at night.  If tolerated can increase dose to 1000 mg twice a day.  Continue Ozempic 0.5 mg/week for now.  Recommend fasting labs.  Follow-up in 3 to 6 months    If Blood glucose are low more often-> 2-3 times/week- give us a call.  Make better food choices: reduce carbs, Reduce portion size, weight loss and exercise 3-4 times a week.    What is hypoglycemia:  Hypoglycemia is when blood sugar levels become too low - below 70 m/dl.      What causes hypoglycemia?  - using too much insulin  -taking too many diabetes pills  -not eating enough, or skipping meals or snacks  -not eating enough carbohydrate with meals  -changing your exercise routine  -drinking alcohol in excess    It is also possible to have hypoglycemia even when you are carefully managing your blood sugar levels.    What does it feel like when blood sugars get too low?  You may feel:  -  anxious  -confused  -dizzy  -hungry  -light-headed  -nervous  -shaky  -sleepy  -sweaty    You may have  -blurred or cloudy vision  -heart palpitations (heart skips a beat or races)  -tingling or numbness around the mouth and tongue  -tremors    What to do if you have symptoms of hypoglycmemia:  If you think your blood sugar is too low, check it with a glucose meter.  If its below 70 mg/dl, consume one of the following:  Fruit juice (1/2 cup)  Glucose tablets (15 grams)  Hard candy (5 to 7 pieces)  Honey or sugar (2 teaspoons)  Milk (1/2 cup)  Soft drink (non-diet, 1/2 cup)    Wait 15 minutes and check your blood glucose again.  IF it is still below 70 mg/dl, have another food item listed above. Wait another 15 minutes and repeat the blood glucose test.  Have a small meal or snack that contains some carbohydrate after your blood glucose rises above 70 mg/dl.    If you are at risk of hypoglycemia, always carry with you glucose tablets or one of the foods listed above.      To prevent Hypoglycemia:  Avoid situations that may cause hypoglycemia  Before making any change to your diet or exercise routine, discuss them with your healthcare provider  Keep a record of your blood glucose levels.  Include the time of day, diabetes medications, when you had your last meal or snack, and what you were doing at the time (e.g. Watching TV, gardening, jogging, etc).    Talk to your healthcare provider if your blood glucose levels are often low        Patient guide on hypoglycemia    http://[email or web address].org/Resources/[email or web address]-guide-diagnosis-[email or web address]-management-[email or web address]

## 2022-09-20 NOTE — LETTER
"    9/20/2022         RE: Milan Carrillo  [address] [facility] [address]        Dear Colleague,    Thank you for referring your patient, Milan Carrillo, to the [facility]. Please see a copy of my visit note below.    Outcome for 09/14/22 9:41 AM: [employer] message sent  Tamera Penningtonwick, VF  Outcome for 09/16/22 2:27 PM: Per patient, will send BG readings via [employer]  Rebecca Randall, VF  Outcome for 09/19/22 9:47 AM: Left Voicemail   Tamera Blossom, VF  Outcome for 09/19/22 10:13 AM: Glucose Readings sent via [employer]  Tamera Cerrato, VF            Milan is a 47 year old who is being evaluated via a billable video visit.        THIS IS A VIDEO VISIT:    Phone call visit/virtual visit encounter:    Name of patient: Milan Carrillo    Date of encounter: 9/20/2022    Time of start of video visit: 2:30    Video started: 2:40    Video ended: 2:53    Provider location: working from home/ [facility]    Patient location: patients home.    Mode of transmission: [employer]    Verbal consent: obtained before starting visit. Pt is agreeable.      The patient has been notified of following:      \"This VIDEO visit will be conducted via a call between you and your physician/provider. We have found that certain health care needs can be provided without the need for a physical exam.  This service lets us provide the care you need with a short phone conversation.  If a prescription is necessary we can send it directly to your pharmacy.  If lab work is needed we can place an order for that and you can then stop by our lab to have the test done at a later time.     With new updates with corona virus patient might be billed as clinic visit.     If during the course of the call the physician/provider feels a telephone visit is not appropriate, you will not be charged for this service.\"      Past medical history, social history, family history, allergy and medications were reviewed and " updated as appropriate.  Reviewed pertinent labs, notes, imaging studies personally.      ENDOCRINOLOGY CLINIC NOTE:  Name: Milan Carrillo  Seen in consultation with Sandor Sanford for Diabetes.  HPI:  Milan Carrillo is a 47 year old male who presents for the evaluation/management of Diabetes.   has a past medical history of Arthritis, Hyperlipidemia, Hypertension, Kidney stone, and Sleep apnea.      1. Type 2 DM:  Orginally diagnosed at the age of: 46.  Current Regimen:   yes:     Diabetes Medication(s)     Biguanides       metFORMIN (GLUCOPHAGE) 500 MG tablet    Take 1 tablet (500 mg) by mouth 2 times daily (with meals)    Incretin Mimetic Agents (GLP-1 Receptor Agonists)       OZEMPIC, 0.25 OR 0.5 MG/DOSE, 2 MG/1.5ML SOPN pen    INJECT 0.5MG UNDER THE SKIN ONCE WEEKLY      Not able to tolerate higher dose of regular metformin 2/2 to GI s/e.  Takes regular metformin.    BS checks: 1-2 times/day  Average Meter Download: see [employer] message  Exercise: limited. Feels fatigued.  Last A1c:   Symptoms of hypoglycemia (low blood sugar):  Gets symptoms of hypoglycemia.  Episodes of hypoglycemia:    DM Complications:   Complications:   Diabetes Complications  Description / Detail    Diabetic Retinopathy  No   CAD / PAD  No   Neuropathy  + Dm neuropathy   Nephropathy / Microalbuminuria  No  No results found for: UMALCR      Gastroparesis  No   Hypoglycemia [place]  No       2. Hypertension:    Not on medications.  3. Hyperlipidemia: Not on medications. Noted to have high TG.    PMH/PSH:  Past Medical History:   Diagnosis Date     Arthritis      Hyperlipidemia      Hypertension      Kidney stone      Sleep apnea      Past Surgical History:   Procedure Laterality Date     ARTHROSCOPY SHOULDER ROTATOR CUFF REPAIR Right      ENT SURGERY       EYE SURGERY       HERNIA REPAIR       HERNIORRHAPHY UMBILICAL N/A 5/29/2020    Procedure: OPEN UMBILICAL HERNIA REPAIR WITH  MESH;  Surgeon: Curt Ramachandran MD;  Location:  OR      ORTHOPEDIC SURGERY      R shoulder     RECESSION RESECTION (REPAIR STRABISMUS)  10/1/2012    Procedure: RECESSION RESECTION (REPAIR STRABISMUS);  RIGHT STRABISMUS REPAIR;  Surgeon: Joanie Gurrola MD;  Location: [facility]     RHINOPLASTY       STRABISMUS SURGERY       Family Hx:  Family History   Problem Relation Age of Onset     Glaucoma No family hx of      Macular Degeneration No family hx of      Pulmonary fibrosis Mother      No Known Problems Father      Hypertension Maternal Grandmother      Diabetes No family hx of      Cancer No family hx of        Diabetes:    Social Hx:  Social History     Socioeconomic History     Marital status:      Spouse name: Not on file     Number of children: Not on file     Years of education: Not on file     Highest education level: Not on file   Occupational History     Not on file   Tobacco Use     Smoking status: Never Smoker     Smokeless tobacco: Never Used   Substance and Sexual Activity     Alcohol use: Not Currently     Comment: Alcoholic Drinks/day: 2/month     Drug use: Never     Sexual activity: Not on file   Other Topics Concern     Parent/sibling w/ CABG, MI or angioplasty before 65F 55M? Not Asked   Social History Narrative     Not on file     Social Determinants of Health     Financial Resource Strain: Not on file   Food Insecurity: Not on file   Transportation Needs: Not on file   Physical Activity: Not on file   Stress: Not on file   Social Connections: Not on file   Intimate Partner Violence: Not on file   Housing Stability: Not on file          MEDICATIONS:  has a current medication list which includes the following prescription(s): cinnamon, contour next test, metformin, ozempic (0.25 or 0.5 mg/dose), red yeast rice extract, sildenafil, and vitamin d2.    ROS     ROS: 10 point ROS neg other than the symptoms noted above in the HPI.    Physical Exam   VS: There were no vitals taken for this visit.  GENERAL: healthy, alert and no distress  EYES: Eyes  grossly normal to inspection, conjunctivae and sclerae normal  ENT: no nose swelling, nasal discharge.  Thyroid: no apparent thyroid nodules  RESP: no audible wheeze, cough, or visible cyanosis.  No visible retractions or increased work of breathing.  Able to speak fully in complete sentences.  ABDO: not evaluated.  EXTREMITIES: no hand tremors.  NEURO: Cranial nerves grossly intact, mentation intact and speech normal  SKIN: No apparent skin lesions, rash or edema seen   PSYCH: mentation appears normal, affect normal/bright, judgement and insight intact, normal speech and appearance well-groomed    LABS:  A1c:  Lab Results   Component Value Date    A1C 6.0 06/09/2022    A1C 6.5 03/28/2022    A1C 6.3 12/28/2021    A1C 6.1 09/27/2021    A1C 7.3 07/26/2021    A1C 5.4 05/30/2017    A1C 5.7 02/24/2016    A1C 5.2 06/04/2010       Creatinine:  Creatinine   Date Value Ref Range Status   06/09/2022 1.00 0.70 - 1.30 mg/dL Final   04/15/2020 1.08 0.66 - 1.25 mg/dL Final   ]    Urine Micro:  No results found for: MICROL  No results found for: MICROALBUMIN      LFTs/Lipids:  Lab Results   Component Value Date    CHOL 193 06/09/2022    CHOL 206.7 05/30/2017     Lab Results   Component Value Date    HDL 31 06/09/2022    HDL 35.1 05/30/2017     Lab Results   Component Value Date    LDL  06/09/2022      Comment:      Cannot estimate LDL when triglyceride exceeds 400 mg/dL     06/09/2022     05/30/2017     Lab Results   Component Value Date    TRIG 573 06/09/2022    TRIG 237.5 05/30/2017     Lab Results   Component Value Date    CHOLHDLRATIO 5.9 05/30/2017       TFTs:  TSH   Date Value Ref Range Status   06/09/2022 3.04 0.30 - 5.00 uIU/mL Final   09/18/2015 2.52 0.40 - 4.00 mU/L Final       All pertinent notes, labs, and images personally reviewed by me.     Glucometer/ insulin pump (if applicable)/ CGM data (if applicable) downloaded, Personally reviewed and interpreted.  All Blood sugar data reviewed personally and  discussed with pt.  See nursing note from 9/20/2022 for details of BG/CGM log.      A/P  Mr.David JODY Carrillo is a 47 year old here for the evaluation/management of diabetes:    1. DM2 - Under Good control. A1c 6.0%.   No known complications from DM.  In general BG in rage.  He is interested in medication that will help with wt loss as well.  Plan:  Discussed diagnosis, pathophysiology, management and treatment options of condition with pt.  I also discussed importance of strict blood sugar control to prevent complications associated with uncontrolled diabetes.  Recommend to switch to metformin extended release and see if it is better tolerated than regular metformin.  Plan to stop regular metformin and switch to extended release metformin.  Metformin XR--take 1000 mg in the morning and 500 mg at night.  If tolerated can increase dose to 1000 mg twice a day  Continue Ozempic 0.5 mg/week for now. Can consider increasing dose in future--this will also help with weight loss..  Recommend repeat labs  Follow-up in 3 to 6 months     2. Hypertension - Under Good control.  Not on medication.    3. Hyperlipidemia - Under Poor control.   Not on medication.  Noted to have high triglyceride levels.  Plan to repeat fasting lipid panel.  Based on then consider starting fibrate.      4. Prevention:  Opthalmology-recommend annual  ASA-not indicated secondary to age  Smoking-no    Foot exam: 6/2022.    Most Recent Immunizations   Administered Date(s) Administered     COVID-19,PF,Pfizer (12+ Yrs) 01/13/2021     Influenza (IIV3) PF 09/27/2012     Influenza (intradermal) 10/24/2012     Influenza Vaccine IM > 6 months Valent IIV4 (Alfuria,Fluzone) 09/13/2021     TDAP Vaccine (Adacel) 02/07/2019     TDAP Vaccine (Boostrix) 09/27/2012     Tdap (Adacel,Boostrix) 02/07/2019         Recommend checking blood sugars before meals and at bedtime.    If Blood glucose are low more often-> 2-3 times/week- give us a call.  The patient is advised to  Make better food choices: reduce carbs, Reduce portion size, weight loss and exercise 3-4 times a week.  Discussed hypoglycemia signs and symptoms as well as management in detail.    There is some variability among people, most will usually develop symptoms suggestive of hypoglycemia when blood glucose levels are lowered to the mid 60's. The first set of symptoms are called adrenergic. Patients may experience any of the following nervousness, sweating, intense hunger, trembling, weakness, palpitations, and difficulty speaking.   The acute management of hypoglycemia involves the rapid delivery of a source of easily absorbed sugar. Regular soda, juice, lifesavers, table sugar, are good options. 15 grams of glucose is the dose that is given, followed by an assessment of symptoms and a blood glucose check if possible. If after 10 minutes there is no improvement, another 10-15 grams should be given. This can be repeated up to three times. The equivalency of 10-15 grams of glucose (approximate servings) are: Four lifesavers, 4 teaspoons of sugar, or 1/2 can of regular soda or juice.   Noted to have high triglyceride levels.  Plan to repeat fasting lipid panel  Based on that consider starting medication all questions were answered.  The patient indicates understanding of the above issues and agrees with the plan set forth.     Follow-up:  3-6 months    Lola Babcock M.D  Endocrinology  [facility]/[place]  CC: Sandor Sanford    Disclaimer: This note consists of symbols derived from keyboarding, dictation and/or voice recognition software. As a result, there may be errors in the script that have gone undetected. Please consider this when interpreting information found in this chart.    Addendum to above note and clinic visit:    Labs reviewed.    See result note/telephone encounter.    9/19- 101  9/18- 105  9/15- 110  9/13- 88  9/11- 141  9/10- 97  9/9- 113  9/8- 109      Answers for HPI/ROS submitted by the  patient on 9/17/2022  General Symptoms: Yes  Skin Symptoms: No  HENT Symptoms: No  EYE SYMPTOMS: Yes  HEART SYMPTOMS: No  LUNG SYMPTOMS: Yes  INTESTINAL SYMPTOMS: Yes  URINARY SYMPTOMS: No  REPRODUCTIVE SYMPTOMS: Yes  SKELETAL SYMPTOMS: Yes  BLOOD SYMPTOMS: No  NERVOUS SYSTEM SYMPTOMS: No  MENTAL HEALTH SYMPTOMS: No  Fever: No  Loss of appetite: No  Weight loss: No  Weight gain: No  Fatigue: Yes  Night sweats: No  Chills: No  Increased stress: No  Excessive hunger: No  Excessive thirst: No  Feeling hot or cold when others believe the temperature is normal: No  Loss of height: No  Post-operative complications: No  Surgical site pain: No  Hallucinations: No  Change in or Loss of Energy: Yes  Hyperactivity: No  Confusion: No  Eye pain: No  Vision loss: Yes  Dry eyes: No  Watery eyes: No  Eye bulging: No  Double vision: No  Flashing of lights: No  Spots: No  Floaters: No  Redness: No  Crossed eyes: No  Tunnel Vision: No  Yellowing of eyes: No  Eye irritation: No  Cough: No  Sputum or phlegm: No  Coughing up blood: No  Difficulty breating or shortness of breath: No  Snoring: Yes  Wheezing: No  Difficulty breathing on exertion: No  Nighttime Cough: No  Difficulty breathing when lying flat: No  Heart burn or indigestion: No  Nausea: No  Vomiting: No  Abdominal pain: No  Bloating: No  Constipation: Yes  Diarrhea: Yes  Blood in stool: No  Black stools: No  Rectal or Anal pain: No  Fecal incontinence: No  Yellowing of skin or eyes: No  Vomit with blood: No  Change in stools: No  Scrotal pain or swelling: No  Erectile dysfunction: No  Penile discharge: No  Genital ulcers: No  Reduced libido: Yes  Back pain: Yes  Muscle aches: Yes  Neck pain: Yes  Swollen joints: Yes  Joint pain: Yes  Bone pain: Yes  Muscle cramps: Yes  Muscle weakness: Yes  Joint stiffness: Yes  Bone fracture: No          Again, thank you for allowing me to participate in the care of your patient.        Sincerely,        Lola Babcock MD

## 2022-09-21 ENCOUNTER — TELEPHONE (OUTPATIENT)
Dept: ENDOCRINOLOGY | Facility: CLINIC | Age: 47
End: 2022-09-21

## 2022-09-21 ENCOUNTER — LAB (OUTPATIENT)
Dept: LAB | Facility: CLINIC | Age: 47
End: 2022-09-21
Payer: COMMERCIAL

## 2022-09-21 DIAGNOSIS — E11.9 TYPE 2 DIABETES MELLITUS WITHOUT COMPLICATION, WITHOUT LONG-TERM CURRENT USE OF INSULIN (H): ICD-10-CM

## 2022-09-21 LAB
CHOLEST SERPL-MCNC: 197 MG/DL
CREAT UR-MCNC: 296 MG/DL
HBA1C MFR BLD: 6 % (ref 0–5.6)
HDLC SERPL-MCNC: 33 MG/DL
LDLC SERPL CALC-MCNC: 116 MG/DL
MICROALBUMIN UR-MCNC: 14.4 MG/L
MICROALBUMIN/CREAT UR: 4.86 MG/G CR (ref 0–17)
NONHDLC SERPL-MCNC: 164 MG/DL
TRIGL SERPL-MCNC: 240 MG/DL

## 2022-09-21 PROCEDURE — 80061 LIPID PANEL: CPT

## 2022-09-21 PROCEDURE — 36415 COLL VENOUS BLD VENIPUNCTURE: CPT

## 2022-09-21 PROCEDURE — 82043 UR ALBUMIN QUANTITATIVE: CPT

## 2022-09-21 PROCEDURE — 83036 HEMOGLOBIN GLYCOSYLATED A1C: CPT

## 2022-09-27 ENCOUNTER — MYC MEDICAL ADVICE (OUTPATIENT)
Dept: ENDOCRINOLOGY | Facility: CLINIC | Age: 47
End: 2022-09-27

## 2022-09-27 DIAGNOSIS — E78.2 MIXED HYPERLIPIDEMIA: ICD-10-CM

## 2022-09-27 DIAGNOSIS — E11.9 TYPE 2 DIABETES MELLITUS WITHOUT COMPLICATION, WITHOUT LONG-TERM CURRENT USE OF INSULIN (H): Primary | ICD-10-CM

## 2022-09-27 NOTE — TELEPHONE ENCOUNTER
Pt responding to lab comment:    Statins- these are type of drug which are usually highly effective to lower LDL cholesterol and is usually very well tolerated. However, statin-type drugs can potentially injure the liver. Blood tests will be required every 3 months for the first 6 months of therapy, and at 6-12 month intervals thereafter.    Be alert for persistent nausea, abdominal pain, or yellow jaundice, and report such to me directly. Statin drugs may also cause skeletal muscle injury in rare cases. Be alert for pronounced persistent diffuse muscle pain and discontinue the drug immediately should such symptoms develop.

## 2022-09-27 NOTE — RESULT ENCOUNTER NOTE
Milan    Recently done endocrinology lab test/ imaging test showed:  Lab Results       Component                Value               Date                       A1C                      6.0                 09/21/2022                 A1C                      6.0                 06/09/2022               Urine test normal.  Noted to have high Triglyceride ( but appears improved)  Also LDL is slightly high.  If you are agreeable can consider small dose of statin ( cholesterol lowering medication) (eg- pravastatin 20 mg)    Statins- these are type of drug which are usually highly effective to lower LDL cholesterol and is usually very well tolerated. However, statin-type drugs can potentially injure the liver. Blood tests will be required every 3 months for the first 6 months of therapy, and at 6-12 month intervals thereafter.    Be alert for persistent nausea, abdominal pain, or yellow jaundice, and report such to me directly. Statin drugs may also cause skeletal muscle injury in rare cases. Be alert for pronounced persistent diffuse muscle pain and discontinue the drug immediately should such symptoms develop.    Here is a copy for your records.    Follow up as discussed in last clinic visit.    Please call endocrinology clinic ( [phone]) if questions.    Lola Babcock MD  Endocrinology   [facility]/[place]  September 27, 2022

## 2022-09-29 RX ORDER — PRAVASTATIN SODIUM 20 MG
20 TABLET ORAL DAILY
Qty: 90 TABLET | Refills: 1 | Status: SHIPPED | OUTPATIENT
Start: 2022-09-29 | End: 2023-03-28

## 2022-11-09 ENCOUNTER — TELEPHONE (OUTPATIENT)
Dept: ENDOCRINOLOGY | Facility: CLINIC | Age: 47
End: 2022-11-09

## 2022-11-09 DIAGNOSIS — E11.9 TYPE 2 DIABETES MELLITUS WITHOUT COMPLICATION, WITHOUT LONG-TERM CURRENT USE OF INSULIN (H): ICD-10-CM

## 2022-11-09 RX ORDER — SEMAGLUTIDE 1.34 MG/ML
INJECTION, SOLUTION SUBCUTANEOUS
Qty: 9 ML | Refills: 1 | Status: SHIPPED | OUTPATIENT
Start: 2022-11-09 | End: 2023-09-05

## 2022-11-09 RX ORDER — METFORMIN HCL 500 MG
1000 TABLET, EXTENDED RELEASE 24 HR ORAL 2 TIMES DAILY WITH MEALS
Qty: 360 TABLET | Refills: 1 | Status: SHIPPED | OUTPATIENT
Start: 2022-11-09 | End: 2023-08-02

## 2022-11-09 NOTE — TELEPHONE ENCOUNTER
[facility] Call Center    Phone Message    May a detailed message be left on voicemail: yes     Reason for Call: Medication Refill Request    Has the patient contacted the pharmacy for the refill? Yes   Name of medication being requested: OZEMPIC, 0.25 OR 0.5 MG/DOSE, 2 MG/1.5ML SOPN pen  And   metFORMIN (GLUCOPHAGE XR) 500 MG 24 hr tablet    Provider who prescribed the medication:      Pharmacy: [id number] [address]  AT [facility]      Date medication is needed: asap           Action Taken: Other: ENDO    Travel Screening: Not Applicable

## 2022-11-15 ENCOUNTER — APPOINTMENT (OUTPATIENT)
Dept: CT IMAGING | Facility: CLINIC | Age: 47
End: 2022-11-15
Attending: EMERGENCY MEDICINE
Payer: COMMERCIAL

## 2022-11-15 ENCOUNTER — HOSPITAL ENCOUNTER (EMERGENCY)
Facility: CLINIC | Age: 47
Discharge: HOME | End: 2022-11-15
Attending: EMERGENCY MEDICINE | Admitting: EMERGENCY MEDICINE
Payer: COMMERCIAL

## 2022-11-15 VITALS
RESPIRATION RATE: 20 BRPM | TEMPERATURE: 97.4 F | HEART RATE: 84 BPM | DIASTOLIC BLOOD PRESSURE: 102 MMHG | HEIGHT: 71 IN | BODY MASS INDEX: 35.7 KG/M2 | OXYGEN SATURATION: 96 % | SYSTOLIC BLOOD PRESSURE: 173 MMHG | WEIGHT: 255 LBS

## 2022-11-15 DIAGNOSIS — N20.1 URETEROLITHIASIS: ICD-10-CM

## 2022-11-15 LAB
ALBUMIN UR-MCNC: 50 MG/DL
ANION GAP SERPL CALCULATED.3IONS-SCNC: 10 MMOL/L (ref 5–18)
APPEARANCE UR: ABNORMAL
BACTERIA #/AREA URNS HPF: ABNORMAL /HPF
BASOPHILS # BLD AUTO: 0.1 10E3/UL (ref 0–0.2)
BASOPHILS NFR BLD AUTO: 1 %
BILIRUB UR QL STRIP: NEGATIVE
BUN SERPL-MCNC: 15 MG/DL (ref 8–22)
C REACTIVE PROTEIN LHE: 0.4 MG/DL (ref 0–?)
CALCIUM SERPL-MCNC: 9.9 MG/DL (ref 8.5–10.5)
CHLORIDE BLD-SCNC: 107 MMOL/L (ref 98–107)
CO2 SERPL-SCNC: 25 MMOL/L (ref 22–31)
COLOR UR AUTO: ABNORMAL
CREAT SERPL-MCNC: 1.27 MG/DL (ref 0.7–1.3)
EOSINOPHIL # BLD AUTO: 0.2 10E3/UL (ref 0–0.7)
EOSINOPHIL NFR BLD AUTO: 3 %
ERYTHROCYTE [DISTWIDTH] IN BLOOD BY AUTOMATED COUNT: 14 % (ref 10–15)
GFR SERPL CREATININE-BSD FRML MDRD: 71 ML/MIN/1.73M2
GLUCOSE BLD-MCNC: 198 MG/DL (ref 70–125)
GLUCOSE UR STRIP-MCNC: 50 MG/DL
HCT VFR BLD AUTO: 50.3 % (ref 40–53)
HGB BLD-MCNC: 16.7 G/DL (ref 13.3–17.7)
HGB UR QL STRIP: ABNORMAL
IMM GRANULOCYTES # BLD: 0.1 10E3/UL
IMM GRANULOCYTES NFR BLD: 1 %
KETONES UR STRIP-MCNC: NEGATIVE MG/DL
LEUKOCYTE ESTERASE UR QL STRIP: NEGATIVE
LYMPHOCYTES # BLD AUTO: 2.1 10E3/UL (ref 0.8–5.3)
LYMPHOCYTES NFR BLD AUTO: 32 %
MCH RBC QN AUTO: 29.7 PG (ref 26.5–33)
MCHC RBC AUTO-ENTMCNC: 33.2 G/DL (ref 31.5–36.5)
MCV RBC AUTO: 90 FL (ref 78–100)
MONOCYTES # BLD AUTO: 0.5 10E3/UL (ref 0–1.3)
MONOCYTES NFR BLD AUTO: 8 %
MUCOUS THREADS #/AREA URNS LPF: PRESENT /LPF
NEUTROPHILS # BLD AUTO: 3.5 10E3/UL (ref 1.6–8.3)
NEUTROPHILS NFR BLD AUTO: 55 %
NITRATE UR QL: NEGATIVE
NRBC # BLD AUTO: 0 10E3/UL
NRBC BLD AUTO-RTO: 0 /100
PH UR STRIP: 5.5 [PH] (ref 5–7)
PLATELET # BLD AUTO: 261 10E3/UL (ref 150–450)
POTASSIUM BLD-SCNC: 4 MMOL/L (ref 3.5–5)
RBC # BLD AUTO: 5.62 10E6/UL (ref 4.4–5.9)
RBC URINE: 173 /HPF
SODIUM SERPL-SCNC: 142 MMOL/L (ref 136–145)
SP GR UR STRIP: 1.02 (ref 1–1.03)
UROBILINOGEN UR STRIP-MCNC: <2 MG/DL
WBC # BLD AUTO: 6.4 10E3/UL (ref 4–11)
WBC URINE: 4 /HPF

## 2022-11-15 PROCEDURE — 36415 COLL VENOUS BLD VENIPUNCTURE: CPT | Performed by: EMERGENCY MEDICINE

## 2022-11-15 PROCEDURE — 86140 C-REACTIVE PROTEIN: CPT | Performed by: EMERGENCY MEDICINE

## 2022-11-15 PROCEDURE — 96376 TX/PRO/DX INJ SAME DRUG ADON: CPT

## 2022-11-15 PROCEDURE — 96375 TX/PRO/DX INJ NEW DRUG ADDON: CPT

## 2022-11-15 PROCEDURE — 250N000013 HC RX MED GY IP 250 OP 250 PS 637: Performed by: EMERGENCY MEDICINE

## 2022-11-15 PROCEDURE — 82310 ASSAY OF CALCIUM: CPT | Performed by: EMERGENCY MEDICINE

## 2022-11-15 PROCEDURE — 250N000011 HC RX IP 250 OP 636: Performed by: EMERGENCY MEDICINE

## 2022-11-15 PROCEDURE — 96374 THER/PROPH/DIAG INJ IV PUSH: CPT

## 2022-11-15 PROCEDURE — 81001 URINALYSIS AUTO W/SCOPE: CPT | Performed by: EMERGENCY MEDICINE

## 2022-11-15 PROCEDURE — 85014 HEMATOCRIT: CPT | Performed by: EMERGENCY MEDICINE

## 2022-11-15 PROCEDURE — 82374 ASSAY BLOOD CARBON DIOXIDE: CPT | Performed by: EMERGENCY MEDICINE

## 2022-11-15 PROCEDURE — 74176 CT ABD & PELVIS W/O CONTRAST: CPT

## 2022-11-15 PROCEDURE — 96361 HYDRATE IV INFUSION ADD-ON: CPT

## 2022-11-15 PROCEDURE — 99285 EMERGENCY DEPT VISIT HI MDM: CPT | Mod: 25

## 2022-11-15 PROCEDURE — 258N000003 HC RX IP 258 OP 636: Performed by: EMERGENCY MEDICINE

## 2022-11-15 RX ORDER — ONDANSETRON 2 MG/ML
4 INJECTION INTRAMUSCULAR; INTRAVENOUS ONCE
Status: COMPLETED | OUTPATIENT
Start: 2022-11-15 | End: 2022-11-15

## 2022-11-15 RX ORDER — OXYCODONE HYDROCHLORIDE 5 MG/1
5 TABLET ORAL EVERY 4 HOURS PRN
Qty: 12 TABLET | Refills: 0 | Status: SHIPPED | OUTPATIENT
Start: 2022-11-15 | End: 2022-11-19

## 2022-11-15 RX ORDER — KETOROLAC TROMETHAMINE 15 MG/ML
15 INJECTION, SOLUTION INTRAMUSCULAR; INTRAVENOUS ONCE
Status: COMPLETED | OUTPATIENT
Start: 2022-11-15 | End: 2022-11-15

## 2022-11-15 RX ORDER — DIMENHYDRINATE 50 MG
50 TABLET ORAL EVERY 6 HOURS PRN
Qty: 28 TABLET | Refills: 0 | Status: SHIPPED | OUTPATIENT
Start: 2022-11-15 | End: 2022-11-22

## 2022-11-15 RX ORDER — ACETAMINOPHEN 325 MG/1
975 TABLET ORAL ONCE
Status: COMPLETED | OUTPATIENT
Start: 2022-11-15 | End: 2022-11-15

## 2022-11-15 RX ORDER — IBUPROFEN 200 MG
400 TABLET ORAL EVERY 6 HOURS
Qty: 56 TABLET | Refills: 0 | Status: SHIPPED | OUTPATIENT
Start: 2022-11-15 | End: 2022-11-22

## 2022-11-15 RX ORDER — ACETAMINOPHEN 500 MG
1000 TABLET ORAL EVERY 6 HOURS
Qty: 56 TABLET | Refills: 0 | Status: SHIPPED | OUTPATIENT
Start: 2022-11-15 | End: 2022-11-22

## 2022-11-15 RX ADMIN — HYDROMORPHONE HYDROCHLORIDE 1 MG: 1 INJECTION, SOLUTION INTRAMUSCULAR; INTRAVENOUS; SUBCUTANEOUS at 11:28

## 2022-11-15 RX ADMIN — SODIUM CHLORIDE 1000 ML: 9 INJECTION, SOLUTION INTRAVENOUS at 09:07

## 2022-11-15 RX ADMIN — HYDROMORPHONE HYDROCHLORIDE 1 MG: 1 INJECTION, SOLUTION INTRAMUSCULAR; INTRAVENOUS; SUBCUTANEOUS at 10:02

## 2022-11-15 RX ADMIN — ONDANSETRON 4 MG: 2 INJECTION INTRAMUSCULAR; INTRAVENOUS at 09:07

## 2022-11-15 RX ADMIN — ACETAMINOPHEN 975 MG: 325 TABLET, FILM COATED ORAL at 09:10

## 2022-11-15 RX ADMIN — KETOROLAC TROMETHAMINE 15 MG: 15 INJECTION, SOLUTION INTRAMUSCULAR; INTRAVENOUS at 09:08

## 2022-11-15 ASSESSMENT — ACTIVITIES OF DAILY LIVING (ADL): ADLS_ACUITY_SCORE: 35

## 2022-11-15 NOTE — ED TRIAGE NOTES
Pt here with left flank pain left lower abdomen pain that started at 0500 today. History of kidney stones.

## 2022-11-15 NOTE — ED PROVIDER NOTES
EMERGENCY DEPARTMENT ENCOUNTER      NAME: Milan Carrillo  AGE: 47 year old male  YOB: 1975  MRN: [id number]  EVALUATION DATE & TIME: No admission date for patient encounter.    PCP: No primary care provider on file.    ED PROVIDER: Mitch Jaffe D.O.      Chief Complaint   Patient presents with     Flank Pain       FINAL IMPRESSION:  1. Ureterolithiasis        ED COURSE & MEDICAL DECISION MAKIN:43 AM I met with the patient in triage to gather history and to perform my initial exam. I discussed the plan for care while in the Emergency Department.  10:00 AM I rechecked and updated the patient. Patient's pain is worse, so I will order stronger pain medication.  10:49 AM I rechecked and updated the patient on his CT abdomen/pelvis results. He is still in a significant amount of pain, so I will give him more pain medication.  11:32 AM I rechecked and updated the patient   11:54 AM I rechecked and updated the patient. Patient reports he feels good enough to go home.         Pertinent Labs & Imaging studies reviewed. (See chart for details)  47 year old male presents to the Emergency Department for evaluation of left-sided flank pain.  Patient had previous history of kidney stones, but typically on the right.  No dysuria, UA did not show any evidence of infection.  However there was significant red blood cells.  Initial concern was for likely kidney stone, versus other intra-abdominal pathology.  CT imaging does confirm to left-sided ureteral stones.  At this time, I do believe the patient can be safely discharged home as he is tolerant to oral intake and his pain is well controlled.  I do suspect that the stones are the source of the patient's symptoms.  He will follow-up as an outpatient with [facility].  Return precautions discussed.        At the conclusion of the encounter I discussed the results of all of the tests and the disposition. The questions were answered. The patient or family acknowledged  understanding and was agreeable with the care plan.    Medical Decision Making    History:    Supplemental history from: N/A    External Record(s) reviewed: N/A (Details documented in chart).    Work Up:    Chart documentation includes differential considered and any EKGs or imaging interpreted by provider.    In additional to work up documented, I considered the following work up: See chart documentation, if applicable.    External consultation:    Discussion of management with another provider: See chart documentation, if applicable    Discussed with radiology regarding test interpretation: N/A    Complicating factors:    Care impacted by chronic illness: None    Care affected by social determinants of health: N/A    Disposition considerations: Discharge. I prescribed additional medications as charted. I considered admission but ultimately discharged patient with improvement of symptoms, patient can be discharged home.         HPI    Patient information was obtained from: Patient    Use of : N/A        Milan Carrillo is a 47 year old male with a history of kidney stones, HTN, DM2, and hyperlipidemia, who presents with left side flank pain.    Patient reports left-sided flank pain is rated a 9/10 on the pain scale. Pain started this morning mild and progressively got worse. He endorses a history of kidney stones, stating that this feels similar, though the pain is usually to his right side. Patient denies chest pain or shortness of breath. No nausea, vomiting, diarrhea, or fevers. No cough, congestion, or sore throat. He endorses pain sometimes radiates to his groin/testicles. He denies dysuria or hematuria. Patient reports prior surgical history of a hernia repair 2 years ago. No tobacco or alcohol use. No other current complaints.      REVIEW OF SYSTEMS  Constitutional:  Denies fever, chills, weight loss or weakness  Eyes:  No pain, discharge, redness  HENT:  Denies sore throat, ear pain,  congestion  Respiratory: No SOB, wheeze or cough  Cardiovascular:  No CP, palpitations  GI:  Denies abdominal pain, nausea, vomiting, diarrhea  : Denies dysuria, hematuria. Positive for left flank pain.  Musculoskeletal:  Denies any new muscle/joint pain, swelling or loss of function.  Skin:  Denies rash, pallor  Neurologic:  Denies headache, focal weakness or sensory changes  Lymph: Denies swollen nodes    All other systems negative unless noted in HPI.    PAST MEDICAL HISTORY:  Past Medical History:   Diagnosis Date     Arthritis      Hyperlipidemia      Hypertension      Kidney stone      Sleep apnea        PAST SURGICAL HISTORY:  Past Surgical History:   Procedure Laterality Date     ARTHROSCOPY SHOULDER ROTATOR CUFF REPAIR Right      ENT SURGERY       EYE SURGERY       HERNIA REPAIR       HERNIORRHAPHY UMBILICAL N/A 5/29/2020    Procedure: OPEN UMBILICAL HERNIA REPAIR WITH  MESH;  Surgeon: Curt Ramachandran MD;  Location:  OR     ORTHOPEDIC SURGERY      R shoulder     RECESSION RESECTION (REPAIR STRABISMUS)  10/1/2012    Procedure: RECESSION RESECTION (REPAIR STRABISMUS);  RIGHT STRABISMUS REPAIR;  Surgeon: Joanie Gurrola MD;  Location:  [facility]     RHINOPLASTY       STRABISMUS SURGERY           CURRENT MEDICATIONS:    No current facility-administered medications for this encounter.     Current Outpatient Medications   Medication     acetaminophen (TYLENOL) 500 MG tablet     dimenhyDRINATE (DRAMAMINE) 50 MG tablet     ibuprofen (ADVIL/MOTRIN) 200 MG tablet     oxyCODONE (ROXICODONE) 5 MG tablet     cinnamon 500 MG CAPS     CONTOUR NEXT TEST test strip     metFORMIN (GLUCOPHAGE XR) 500 MG 24 hr tablet     pravastatin (PRAVACHOL) 20 MG tablet     Red Yeast Rice Extract 600 MG CAPS     semaglutide (OZEMPIC, 0.25 OR 0.5 MG/DOSE,) 2 MG/1.5ML SOPN pen     sildenafil (REVATIO) 20 MG tablet     vitamin D2 (ERGOCALCIFEROL) 83743 units (1250 mcg) capsule         ALLERGIES:  No Known Allergies    FAMILY  "HISTORY:  Family History   Problem Relation Age of Onset     Glaucoma No family hx of      Macular Degeneration No family hx of      Pulmonary fibrosis Mother      No Known Problems Father      Hypertension Maternal Grandmother      Diabetes No family hx of      Cancer No family hx of        SOCIAL HISTORY:  Social History     Socioeconomic History     Marital status:    Tobacco Use     Smoking status: Never     Smokeless tobacco: Never   Substance and Sexual Activity     Alcohol use: Not Currently     Comment: Alcoholic Drinks/day: 2/month     Drug use: Never       VITALS:  Patient Vitals for the past 24 hrs:   BP Temp Pulse Resp SpO2 Height Weight   11/15/22 0843 (!) 173/102 97.4  F (36.3  C) 84 20 96 % 1.803 m (5' 11\") 115.7 kg (255 lb)       PHYSICAL EXAM    VITAL SIGNS: BP (!) 173/102   Pulse 84   Temp 97.4  F (36.3  C)   Resp 20   Ht 1.803 m (5' 11\")   Wt 115.7 kg (255 lb)   SpO2 96%   BMI 35.57 kg/m      General Appearance: Well-appearing, well-nourished, moderate distress secondary to pain.  Head:  Normocephalic, without obvious abnormality, atraumatic  Eyes:  PERRL, conjunctiva/corneas clear, EOM's intact,  ENT:  Lips, mucosa, and tongue normal, membranes are moist without pallor  Neck:  Normal ROM, symmetrical, trachea midline    Cardio:  Regular rate and rhythm, no murmur, rub or gallop, 2+ pulses symmetric in all extremities  Pulm:  Clear to auscultation bilaterally, respirations unlabored,  Back:  ROM normal, no spinal tenderness, no paraspinal tenderness. Mild left CVA tenderness to palpation.  Abdomen:  Soft, non-tender, no rebound or guarding.  Musculoskeletal: Full ROM, no edema, no cyanosis, good ROM of major joints  Integument:  Warm, Dry, No erythema, No rash.    Neurologic:  Alert & oriented.  No focal deficits appreciated.  Ambulatory.  Psychiatric:  Affect normal, Judgment normal, Mood normal.      LABS  Results for orders placed or performed during the hospital encounter of " 11/15/22 (from the past 24 hour(s))   CBC with platelets + differential    Narrative    The following orders were created for panel order CBC with platelets + differential.  Procedure                               Abnormality         Status                     ---------                               -----------         ------                     CBC with platelets and d...[[id number]]                      Final result                 Please view results for these tests on the individual orders.   Basic metabolic panel   Result Value Ref Range    Sodium 142 136 - 145 mmol/L    Potassium 4.0 3.5 - 5.0 mmol/L    Chloride 107 98 - 107 mmol/L    Carbon Dioxide (CO2) 25 22 - 31 mmol/L    Anion Gap 10 5 - 18 mmol/L    Urea Nitrogen 15 8 - 22 mg/dL    Creatinine 1.27 0.70 - 1.30 mg/dL    Calcium 9.9 8.5 - 10.5 mg/dL    Glucose 198 (H) 70 - 125 mg/dL    GFR Estimate 71 >60 mL/min/1.73m2   CRP inflammation   Result Value Ref Range    CRP 0.4 0.0 - <0.8 mg/dL   CBC with platelets and differential   Result Value Ref Range    WBC Count 6.4 4.0 - 11.0 10e3/uL    RBC Count 5.62 4.40 - 5.90 10e6/uL    Hemoglobin 16.7 13.3 - 17.7 g/dL    Hematocrit 50.3 40.0 - 53.0 %    MCV 90 78 - 100 fL    MCH 29.7 26.5 - 33.0 pg    MCHC 33.2 31.5 - 36.5 g/dL    RDW 14.0 10.0 - 15.0 %    Platelet Count 261 150 - 450 10e3/uL    % Neutrophils 55 %    % Lymphocytes 32 %    % Monocytes 8 %    % Eosinophils 3 %    % Basophils 1 %    % Immature Granulocytes 1 %    NRBCs per 100 WBC 0 <1 /100    Absolute Neutrophils 3.5 1.6 - 8.3 10e3/uL    Absolute Lymphocytes 2.1 0.8 - 5.3 10e3/uL    Absolute Monocytes 0.5 0.0 - 1.3 10e3/uL    Absolute Eosinophils 0.2 0.0 - 0.7 10e3/uL    Absolute Basophils 0.1 0.0 - 0.2 10e3/uL    Absolute Immature Granulocytes 0.1 <=0.4 10e3/uL    Absolute NRBCs 0.0 10e3/uL   UA with Microscopic reflex to Culture    Specimen: Urine, Clean Catch   Result Value Ref Range    Color Urine Dark Yellow (A) Colorless, Straw, Light Yellow,  Yellow    Appearance Urine Turbid (A) Clear    Glucose Urine 50 (A) Negative mg/dL    Bilirubin Urine Negative Negative    Ketones Urine Negative Negative mg/dL    Specific Gravity Urine 1.024 1.001 - 1.030    Blood Urine >1.0 mg/dL (A) Negative    pH Urine 5.5 5.0 - 7.0    Protein Albumin Urine 50 (A) Negative mg/dL    Urobilinogen Urine <2.0 <2.0 mg/dL    Nitrite Urine Negative Negative    Leukocyte Esterase Urine Negative Negative    Bacteria Urine Few (A) None Seen /HPF    Mucus Urine Present (A) None Seen /LPF    RBC Urine 173 (H) <=2 /HPF    WBC Urine 4 <=5 /HPF    Narrative    Urine Culture not indicated   Abd/pelvis CT no contrast - Stone Protocol    Narrative    EXAM: CT ABDOMEN PELVIS W/O CONTRAST  LOCATION: [facility]  DATE/TIME: 11/15/2022 10:31 AM    INDICATION: Left flank pain  COMPARISON: 04/15/2020  TECHNIQUE: CT scan of the abdomen and pelvis was performed without IV contrast. Multiplanar reformats were obtained. Dose reduction techniques were used.  CONTRAST: None.    FINDINGS:   LOWER CHEST: Normal.    HEPATOBILIARY: Normal.    PANCREAS: Normal.    SPLEEN: Normal.    ADRENAL GLANDS: Normal.    KIDNEYS/BLADDER: There is a 0.6 x 0.4 x 0.4 cm calculus in the proximal left ureter with mild upstream collecting system dilation and perinephric fat stranding. Another smaller 0.2 cm calculus slightly inferiorly within the left ureter. Remainder of the   left ureter is normal. Punctate nonobstructing calculus in the lower pole right kidney. Bladder is only minimally distended, limiting evaluation.    BOWEL: No obstruction or inflammatory change. Normal appendix    LYMPH NODES: Normal.    VASCULATURE: Unremarkable.    PELVIC ORGANS: Normal.    MUSCULOSKELETAL: Fat-containing right inguinal hernia.      Impression    IMPRESSION:   1.  Obstructing 0.5 cm calculus in the proximal left ureter. Another smaller 0.2 cm calculus in the left ureter inferior to the obstructing calculus.    2.   Punctate nonobstructing calculus in the right kidney.           RADIOLOGY  Abd/pelvis CT no contrast - Stone Protocol   Final Result   IMPRESSION:    1.  Obstructing 0.5 cm calculus in the proximal left ureter. Another smaller 0.2 cm calculus in the left ureter inferior to the obstructing calculus.      2.  Punctate nonobstructing calculus in the right kidney.                MEDICATIONS GIVEN IN THE EMERGENCY:  Medications   0.9% sodium chloride BOLUS (0 mLs Intravenous Stopped 11/15/22 1216)   ondansetron (ZOFRAN) injection 4 mg (4 mg Intravenous Given 11/15/22 0907)   ketorolac (TORADOL) injection 15 mg (15 mg Intravenous Given 11/15/22 0908)   acetaminophen (TYLENOL) tablet 975 mg (975 mg Oral Given 11/15/22 0910)   HYDROmorphone (DILAUDID) injection 1 mg (1 mg Intravenous Given 11/15/22 1002)   HYDROmorphone (DILAUDID) injection 1 mg (1 mg Intravenous Given 11/15/22 1128)       NEW PRESCRIPTIONS STARTED AT TODAY'S ER VISIT  Discharge Medication List as of 11/15/2022 12:16 PM      START taking these medications    Details   acetaminophen (TYLENOL) 500 MG tablet Take 2 tablets (1,000 mg) by mouth every 6 hours for 7 days, Disp-56 tablet, R-0, Local Print      dimenhyDRINATE (DRAMAMINE) 50 MG tablet Take 1 tablet (50 mg) by mouth every 6 hours as needed for other (kidney stone pain management), Disp-28 tablet, R-0, Local Print      ibuprofen (ADVIL/MOTRIN) 200 MG tablet Take 2 tablets (400 mg) by mouth every 6 hours for 7 days, Disp-56 tablet, R-0, Local Print      oxyCODONE (ROXICODONE) 5 MG tablet Take 1 tablet (5 mg) by mouth every 4 hours as needed for severe pain If pain is not improved with acetaminophen and ibuprofen., Disp-12 tablet, R-0, Local Print            I, Joann Rao, am serving as a scribe to document services personally performed by Dr. Jaffe based on my observation and the provider's statements to me. I, Mitch Jaffe, DO, attest that Joann Rao is acting in a scribe capacity, has observed my  performance of the services and has documented them in accordance with my direction.       Mitch Jaffe D.O.  Emergency Medicine  [facility] EMERGENCY ROOM  [facility] [facility] [phone]  Dept: [phone]     Mitch Jaffe,   11/15/22 0701

## 2022-11-27 ENCOUNTER — APPOINTMENT (OUTPATIENT)
Dept: CT IMAGING | Facility: CLINIC | Age: 47
End: 2022-11-27
Attending: STUDENT IN AN ORGANIZED HEALTH CARE EDUCATION/TRAINING PROGRAM
Payer: COMMERCIAL

## 2022-11-27 ENCOUNTER — HOSPITAL ENCOUNTER (EMERGENCY)
Facility: CLINIC | Age: 47
Discharge: HOME | End: 2022-11-27
Attending: EMERGENCY MEDICINE | Admitting: EMERGENCY MEDICINE
Payer: COMMERCIAL

## 2022-11-27 VITALS
BODY MASS INDEX: 35.7 KG/M2 | RESPIRATION RATE: 18 BRPM | HEART RATE: 84 BPM | HEIGHT: 71 IN | DIASTOLIC BLOOD PRESSURE: 100 MMHG | TEMPERATURE: 98.8 F | SYSTOLIC BLOOD PRESSURE: 188 MMHG | OXYGEN SATURATION: 99 % | WEIGHT: 255 LBS

## 2022-11-27 DIAGNOSIS — N20.1 URETERAL STONE: ICD-10-CM

## 2022-11-27 LAB
ALBUMIN UR-MCNC: 20 MG/DL
ANION GAP SERPL CALCULATED.3IONS-SCNC: 11 MMOL/L (ref 5–18)
APPEARANCE UR: CLEAR
BACTERIA #/AREA URNS HPF: ABNORMAL /HPF
BASOPHILS # BLD AUTO: 0.1 10E3/UL (ref 0–0.2)
BASOPHILS NFR BLD AUTO: 1 %
BILIRUB UR QL STRIP: NEGATIVE
BUN SERPL-MCNC: 14 MG/DL (ref 8–22)
C REACTIVE PROTEIN LHE: 0.2 MG/DL (ref 0–?)
CALCIUM SERPL-MCNC: 10.1 MG/DL (ref 8.5–10.5)
CHLORIDE BLD-SCNC: 106 MMOL/L (ref 98–107)
CO2 SERPL-SCNC: 23 MMOL/L (ref 22–31)
COLOR UR AUTO: YELLOW
CREAT SERPL-MCNC: 1.14 MG/DL (ref 0.7–1.3)
EOSINOPHIL # BLD AUTO: 0.2 10E3/UL (ref 0–0.7)
EOSINOPHIL NFR BLD AUTO: 3 %
ERYTHROCYTE [DISTWIDTH] IN BLOOD BY AUTOMATED COUNT: 13.7 % (ref 10–15)
GFR SERPL CREATININE-BSD FRML MDRD: 81 ML/MIN/1.73M2
GLUCOSE BLD-MCNC: 137 MG/DL (ref 70–125)
GLUCOSE UR STRIP-MCNC: NEGATIVE MG/DL
HCT VFR BLD AUTO: 48.4 % (ref 40–53)
HGB BLD-MCNC: 16.7 G/DL (ref 13.3–17.7)
HGB UR QL STRIP: ABNORMAL
IMM GRANULOCYTES # BLD: 0.1 10E3/UL
IMM GRANULOCYTES NFR BLD: 1 %
KETONES UR STRIP-MCNC: NEGATIVE MG/DL
LEUKOCYTE ESTERASE UR QL STRIP: NEGATIVE
LYMPHOCYTES # BLD AUTO: 2.6 10E3/UL (ref 0.8–5.3)
LYMPHOCYTES NFR BLD AUTO: 29 %
MCH RBC QN AUTO: 29.9 PG (ref 26.5–33)
MCHC RBC AUTO-ENTMCNC: 34.5 G/DL (ref 31.5–36.5)
MCV RBC AUTO: 87 FL (ref 78–100)
MONOCYTES # BLD AUTO: 0.7 10E3/UL (ref 0–1.3)
MONOCYTES NFR BLD AUTO: 8 %
MUCOUS THREADS #/AREA URNS LPF: PRESENT /LPF
NEUTROPHILS # BLD AUTO: 5.4 10E3/UL (ref 1.6–8.3)
NEUTROPHILS NFR BLD AUTO: 58 %
NITRATE UR QL: NEGATIVE
NRBC # BLD AUTO: 0 10E3/UL
NRBC BLD AUTO-RTO: 0 /100
PH UR STRIP: 6 [PH] (ref 5–7)
PLATELET # BLD AUTO: 340 10E3/UL (ref 150–450)
POTASSIUM BLD-SCNC: 4 MMOL/L (ref 3.5–5)
RBC # BLD AUTO: 5.59 10E6/UL (ref 4.4–5.9)
RBC URINE: >182 /HPF
SODIUM SERPL-SCNC: 140 MMOL/L (ref 136–145)
SP GR UR STRIP: 1.03 (ref 1–1.03)
UROBILINOGEN UR STRIP-MCNC: <2 MG/DL
WBC # BLD AUTO: 9.1 10E3/UL (ref 4–11)
WBC URINE: 0 /HPF

## 2022-11-27 PROCEDURE — 86140 C-REACTIVE PROTEIN: CPT | Performed by: NURSE PRACTITIONER

## 2022-11-27 PROCEDURE — 85025 COMPLETE CBC W/AUTO DIFF WBC: CPT | Performed by: NURSE PRACTITIONER

## 2022-11-27 PROCEDURE — 80048 BASIC METABOLIC PNL TOTAL CA: CPT | Performed by: NURSE PRACTITIONER

## 2022-11-27 PROCEDURE — 74177 CT ABD & PELVIS W/CONTRAST: CPT

## 2022-11-27 PROCEDURE — 81001 URINALYSIS AUTO W/SCOPE: CPT | Performed by: NURSE PRACTITIONER

## 2022-11-27 PROCEDURE — 96375 TX/PRO/DX INJ NEW DRUG ADDON: CPT

## 2022-11-27 PROCEDURE — 96374 THER/PROPH/DIAG INJ IV PUSH: CPT | Mod: 59

## 2022-11-27 PROCEDURE — 36415 COLL VENOUS BLD VENIPUNCTURE: CPT | Performed by: NURSE PRACTITIONER

## 2022-11-27 PROCEDURE — 250N000011 HC RX IP 250 OP 636: Performed by: NURSE PRACTITIONER

## 2022-11-27 PROCEDURE — 99285 EMERGENCY DEPT VISIT HI MDM: CPT | Mod: 25

## 2022-11-27 RX ORDER — KETOROLAC TROMETHAMINE 15 MG/ML
15 INJECTION, SOLUTION INTRAMUSCULAR; INTRAVENOUS ONCE
Status: COMPLETED | OUTPATIENT
Start: 2022-11-27 | End: 2022-11-27

## 2022-11-27 RX ORDER — OXYCODONE HYDROCHLORIDE 5 MG/1
5 TABLET ORAL ONCE
Status: DISCONTINUED | OUTPATIENT
Start: 2022-11-27 | End: 2022-11-27 | Stop reason: HOSPADM

## 2022-11-27 RX ORDER — IBUPROFEN 200 MG
400 TABLET ORAL EVERY 6 HOURS
Qty: 56 TABLET | Refills: 0 | Status: SHIPPED | OUTPATIENT
Start: 2022-11-27 | End: 2022-12-04

## 2022-11-27 RX ORDER — OXYCODONE HYDROCHLORIDE 5 MG/1
5 TABLET ORAL EVERY 4 HOURS PRN
Qty: 12 TABLET | Refills: 0 | Status: SHIPPED | OUTPATIENT
Start: 2022-11-27 | End: 2022-12-01

## 2022-11-27 RX ORDER — ONDANSETRON 4 MG/1
4 TABLET, ORALLY DISINTEGRATING ORAL EVERY 8 HOURS PRN
Qty: 12 TABLET | Refills: 0 | Status: SHIPPED | OUTPATIENT
Start: 2022-11-27 | End: 2022-11-30

## 2022-11-27 RX ORDER — ONDANSETRON 2 MG/ML
4 INJECTION INTRAMUSCULAR; INTRAVENOUS ONCE
Status: COMPLETED | OUTPATIENT
Start: 2022-11-27 | End: 2022-11-27

## 2022-11-27 RX ORDER — ACETAMINOPHEN 325 MG/1
975 TABLET ORAL ONCE
Status: DISCONTINUED | OUTPATIENT
Start: 2022-11-27 | End: 2022-11-27 | Stop reason: HOSPADM

## 2022-11-27 RX ORDER — IOPAMIDOL 755 MG/ML
90 INJECTION, SOLUTION INTRAVASCULAR ONCE
Status: COMPLETED | OUTPATIENT
Start: 2022-11-27 | End: 2022-11-27

## 2022-11-27 RX ORDER — DIMENHYDRINATE 50 MG
50 TABLET ORAL EVERY 6 HOURS PRN
Qty: 28 TABLET | Refills: 0 | Status: SHIPPED | OUTPATIENT
Start: 2022-11-27 | End: 2022-12-04

## 2022-11-27 RX ORDER — ACETAMINOPHEN 500 MG
1000 TABLET ORAL EVERY 6 HOURS
Qty: 56 TABLET | Refills: 0 | Status: SHIPPED | OUTPATIENT
Start: 2022-11-27 | End: 2022-12-04

## 2022-11-27 RX ADMIN — KETOROLAC TROMETHAMINE 15 MG: 15 INJECTION, SOLUTION INTRAMUSCULAR; INTRAVENOUS at 16:23

## 2022-11-27 RX ADMIN — IOPAMIDOL 90 ML: 755 INJECTION, SOLUTION INTRAVENOUS at 16:50

## 2022-11-27 RX ADMIN — ONDANSETRON 4 MG: 2 INJECTION INTRAMUSCULAR; INTRAVENOUS at 16:23

## 2022-11-27 ASSESSMENT — ENCOUNTER SYMPTOMS
COUGH: 0
DIAPHORESIS: 0
VOMITING: 0
NAUSEA: 0
CHILLS: 0
FLANK PAIN: 1
FEVER: 0
FREQUENCY: 0
DIFFICULTY URINATING: 1
HEMATURIA: 0
ABDOMINAL PAIN: 1

## 2022-11-27 NOTE — ED TRIAGE NOTES
Pt presents to the ED with c/o of left flank pain that started today. Pt reports having a kidney stone a few weeks ago.      Triage Assessment     Row Name 11/27/22 1613       Triage Assessment (Adult)    Airway WDL WDL       Respiratory WDL    Respiratory WDL WDL       Skin Circulation/Temperature WDL    Skin Circulation/Temperature WDL WDL       Cardiac WDL    Cardiac WDL WDL       Peripheral/Neurovascular WDL    Peripheral Neurovascular WDL WDL       Cognitive/Neuro/Behavioral WDL    Cognitive/Neuro/Behavioral WDL WDL

## 2022-11-27 NOTE — ED PROVIDER NOTES
EMERGENCY DEPARTMENT ENCOUNTER      NAME: Milan Carrillo  AGE: 47 year old male  YOB: 1975  MRN: [id number]  EVALUATION DATE & TIME: No admission date for patient encounter.    PCP: Sandor Sanford    ED PROVIDER: Jayde Winkler MD      Chief Complaint   Patient presents with     Flank Pain         FINAL IMPRESSION:  1. Ureteral stone          ED COURSE & MEDICAL DECISION MAKING:    Pertinent Labs & Imaging studies reviewed. (See chart for details)  47 year old male presents to the Emergency Department for evaluation of left flank pain that started suddenly today.  He had similar pain about 2 weeks ago, and had resolve 4 to 5 days ago.  He had been diagnosed with a ureteral stone that he assumed had passed.  However, given the sudden onset of return of pain today he presents the emergency for for further evaluation.  He is out of his prescription medications.  CT scan of his abdomen pelvis is consistent with persistent ureteral stone that has traveled slightly distally.  Patient otherwise with no urinary symptoms, no constitutional symptoms.  Patient given pain new pain medications and recommend repeat follow-up with [facility].    At the conclusion of the encounter I discussed the results of all of the tests and the disposition. The questions were answered. The patient or family acknowledged understanding and was agreeable with the care plan.     5:48 PM I met with the patient in triage, obtained history, performed an initial exam, and updated him on test results. We discussed the plan for discharge with analgesics and [facility] follow up and the patient is agreeable. Reviewed supportive cares, symptomatic treatment, outpatient follow up, and reasons to return to the Emergency Department.       Medical Decision Making    History:    Supplemental history from: N/A    External Record(s) reviewed: Documented in HPI, if applicable.    Work Up:    Chart documentation includes differential considered and any EKGs or  imaging interpreted by provider.    In additional to work up documented, I considered the following work up: See chart documentation, if applicable.    External consultation:    Discussion of management with another provider: N/A    Complicating factors:    Care impacted by chronic illness: None    Care affected by social determinants of health: N/A    Disposition considerations: Discharge. I prescribed additional prescription strength medication(s) as charted. I did not consider admission.      MEDICATIONS GIVEN IN THE EMERGENCY:  Medications   ondansetron (ZOFRAN) injection 4 mg (4 mg Intravenous Given 11/27/22 1623)   ketorolac (TORADOL) injection 15 mg (15 mg Intravenous Given 11/27/22 1623)   iopamidol (ISOVUE-370) solution 90 mL (90 mLs Intravenous Given 11/27/22 1650)       NEW PRESCRIPTIONS STARTED AT TODAY'S ER VISIT  Discharge Medication List as of 11/27/2022  6:17 PM      START taking these medications    Details   acetaminophen (TYLENOL) 500 MG tablet Take 2 tablets (1,000 mg) by mouth every 6 hours for 7 days, Disp-56 tablet, R-0, Local Print      dimenhyDRINATE (DRAMAMINE) 50 MG tablet Take 1 tablet (50 mg) by mouth every 6 hours as needed for other (kidney stone pain management), Disp-28 tablet, R-0, Local Print      ibuprofen (ADVIL/MOTRIN) 200 MG tablet Take 2 tablets (400 mg) by mouth every 6 hours for 7 days, Disp-56 tablet, R-0, Local Print      ondansetron (ZOFRAN ODT) 4 MG ODT tab Take 1 tablet (4 mg) by mouth every 8 hours as needed for nausea, Disp-12 tablet, R-0, Local Print      oxyCODONE (ROXICODONE) 5 MG tablet Take 1 tablet (5 mg) by mouth every 4 hours as needed for severe pain (7-10) If pain is not improved with acetaminophen and ibuprofen., Disp-12 tablet, R-0, Local Print                =================================================================    HPI    Patient information was obtained from: patient    Use of : N/A         Milan VERA Gerardo is a 47 year old male with  a pertinent history of pyelonephritis, HTN, and morbid obesity, who presents to this ED via private vehicle for evaluation of flank pain.     Per chart review, patient initially presented to [facility] ED on 11/15/2022 (12 days ago) with acute onset of left sided flank pain. UA without evidence of infection though did show significant RBCs. CT A/P demonstrated an obstructing 0.5 cm calculus in the proximal left ureter as well as a smaller 0.2 cm calculus in the left ureter inferior to the obstructing calculus. Additionally noted a punctate nonobstructing calculus in the right kidney. Pain was well controlled and he was discharged with Oxycodone and plan for outpatient [facility] follow up.     Patient reports his pain resolved 4-5 days ago and he was in his usual state of health up until this morning, when he had acute onset of sharp left sided flank pain that gradually radiated around to his LLQ with associated difficulty voiding. He notes his pain is currently located in his left groin and rates it 7/10. Patient took Ibuprofen and Tylenol PTA without relief. He also endorses darker tinged urine though denies any anabelle blood. Given similarities to his previous episodes of pyelonephritis, patient presented to the ED for evaluation. Otherwise denies fever, chills, diaphoresis, hematuria, urinary urgency or frequency, nausea, vomiting, cough, congestion, or any other symptoms or concerns at this time.      REVIEW OF SYSTEMS   Review of Systems   Constitutional: Negative for chills, diaphoresis and fever.   HENT: Negative for congestion.    Respiratory: Negative for cough.    Gastrointestinal: Positive for abdominal pain (LLQ). Negative for nausea and vomiting.   Genitourinary: Positive for difficulty urinating and flank pain (left sided). Negative for frequency, hematuria and urgency.        Positive for left sided groin pain.   All other systems reviewed and are negative.       PAST MEDICAL HISTORY:  Past Medical History:    Diagnosis Date     Arthritis      Hyperlipidemia      Hypertension      Kidney stone      Sleep apnea        PAST SURGICAL HISTORY:  Past Surgical History:   Procedure Laterality Date     ARTHROSCOPY SHOULDER ROTATOR CUFF REPAIR Right      ENT SURGERY       EYE SURGERY       HERNIA REPAIR       HERNIORRHAPHY UMBILICAL N/A 5/29/2020    Procedure: OPEN UMBILICAL HERNIA REPAIR WITH  MESH;  Surgeon: Curt Ramachandran MD;  Location:  OR     ORTHOPEDIC SURGERY      R shoulder     RECESSION RESECTION (REPAIR STRABISMUS)  10/1/2012    Procedure: RECESSION RESECTION (REPAIR STRABISMUS);  RIGHT STRABISMUS REPAIR;  Surgeon: Joanie Gurrola MD;  Location:  EC     RHINOPLASTY       STRABISMUS SURGERY             CURRENT MEDICATIONS:    acetaminophen (TYLENOL) 500 MG tablet  dimenhyDRINATE (DRAMAMINE) 50 MG tablet  ibuprofen (ADVIL/MOTRIN) 200 MG tablet  ondansetron (ZOFRAN ODT) 4 MG ODT tab  oxyCODONE (ROXICODONE) 5 MG tablet  cinnamon 500 MG CAPS  CONTOUR NEXT TEST test strip  metFORMIN (GLUCOPHAGE XR) 500 MG 24 hr tablet  pravastatin (PRAVACHOL) 20 MG tablet  Red Yeast Rice Extract 600 MG CAPS  semaglutide (OZEMPIC, 0.25 OR 0.5 MG/DOSE,) 2 MG/1.5ML SOPN pen  sildenafil (REVATIO) 20 MG tablet  vitamin D2 (ERGOCALCIFEROL) 37149 units (1250 mcg) capsule        ALLERGIES:  No Known Allergies    FAMILY HISTORY:  Family History   Problem Relation Age of Onset     Glaucoma No family hx of      Macular Degeneration No family hx of      Pulmonary fibrosis Mother      No Known Problems Father      Hypertension Maternal Grandmother      Diabetes No family hx of      Cancer No family hx of        SOCIAL HISTORY:   Social History     Socioeconomic History     Marital status:      Spouse name: None     Number of children: None     Years of education: None     Highest education level: None   Tobacco Use     Smoking status: Never     Smokeless tobacco: Never   Substance and Sexual Activity     Alcohol use: Not Currently  "    Comment: Alcoholic Drinks/day: 2/month     Drug use: Never       VITALS:  BP (!) 188/100   Pulse 84   Temp 98.8  F (37.1  C) (Oral)   Resp 18   Ht 1.803 m (5' 11\")   Wt 115.7 kg (255 lb)   SpO2 99%   BMI 35.57 kg/m      PHYSICAL EXAM    Constitutional: Well developed, Well nourished, NAD  HENT: Normocephalic, Atraumatic, Bilateral external ears normal, Oropharynx normal, mucous membranes moist, Nose normal.   Neck- Normal range of motion, No tenderness, Supple, No stridor.  Eyes: PERRL, EOMI, Conjunctiva normal, No discharge.   Respiratory: Normal breath sounds, No respiratory distress  Cardiovascular: Normal heart rate, Regular rhythm  GI: Bowel sounds normal, Soft, No tenderness,   : No CVA tenderness.   Musculoskeletal: No edema. Good range of motion in all major joints. No tenderness to palpation or major deformities noted.   Integument: Warm, Dry, No erythema, No rash  Neurologic: Alert & oriented x 3, Normal motor function, Normal sensory function, No focal deficits noted. Normal gait.   Psychiatric: Affect normal, Judgment normal, Mood normal.      LAB:  All pertinent labs reviewed and interpreted.  Results for orders placed or performed during the hospital encounter of 11/27/22   CT Abdomen Pelvis w Contrast    Impression    IMPRESSION:     1.  Interval distal migration of the dominant 4-5 mm left ureteral stone, now roughly 2 cm from the left ureterovesicular junction. Similar mild left hydronephrosis.     2.  The prior smaller left ureteral stone is no longer seen.     3.  No new stones or bladder stones.        UA with Microscopic reflex to Culture    Specimen: Urine, Midstream   Result Value Ref Range    Color Urine Yellow Colorless, Straw, Light Yellow, Yellow    Appearance Urine Clear Clear    Glucose Urine Negative Negative mg/dL    Bilirubin Urine Negative Negative    Ketones Urine Negative Negative mg/dL    Specific Gravity Urine 1.027 1.001 - 1.030    Blood Urine >1.0 mg/dL (A) " Negative    pH Urine 6.0 5.0 - 7.0    Protein Albumin Urine 20 (A) Negative mg/dL    Urobilinogen Urine <2.0 <2.0 mg/dL    Nitrite Urine Negative Negative    Leukocyte Esterase Urine Negative Negative    Bacteria Urine Few (A) None Seen /HPF    Mucus Urine Present (A) None Seen /LPF    RBC Urine >182 (H) <=2 /HPF    WBC Urine 0 <=5 /HPF   CRP inflammation   Result Value Ref Range    CRP 0.2 0.0 - <0.8 mg/dL   Basic metabolic panel   Result Value Ref Range    Sodium 140 136 - 145 mmol/L    Potassium 4.0 3.5 - 5.0 mmol/L    Chloride 106 98 - 107 mmol/L    Carbon Dioxide (CO2) 23 22 - 31 mmol/L    Anion Gap 11 5 - 18 mmol/L    Urea Nitrogen 14 8 - 22 mg/dL    Creatinine 1.14 0.70 - 1.30 mg/dL    Calcium 10.1 8.5 - 10.5 mg/dL    Glucose 137 (H) 70 - 125 mg/dL    GFR Estimate 81 >60 mL/min/1.73m2   CBC with platelets and differential   Result Value Ref Range    WBC Count 9.1 4.0 - 11.0 10e3/uL    RBC Count 5.59 4.40 - 5.90 10e6/uL    Hemoglobin 16.7 13.3 - 17.7 g/dL    Hematocrit 48.4 40.0 - 53.0 %    MCV 87 78 - 100 fL    MCH 29.9 26.5 - 33.0 pg    MCHC 34.5 31.5 - 36.5 g/dL    RDW 13.7 10.0 - 15.0 %    Platelet Count 340 150 - 450 10e3/uL    % Neutrophils 58 %    % Lymphocytes 29 %    % Monocytes 8 %    % Eosinophils 3 %    % Basophils 1 %    % Immature Granulocytes 1 %    NRBCs per 100 WBC 0 <1 /100    Absolute Neutrophils 5.4 1.6 - 8.3 10e3/uL    Absolute Lymphocytes 2.6 0.8 - 5.3 10e3/uL    Absolute Monocytes 0.7 0.0 - 1.3 10e3/uL    Absolute Eosinophils 0.2 0.0 - 0.7 10e3/uL    Absolute Basophils 0.1 0.0 - 0.2 10e3/uL    Absolute Immature Granulocytes 0.1 <=0.4 10e3/uL    Absolute NRBCs 0.0 10e3/uL       RADIOLOGY:  Reviewed all pertinent imaging. Please see official radiology report.  CT Abdomen Pelvis w Contrast   Final Result   IMPRESSION:       1.  Interval distal migration of the dominant 4-5 mm left ureteral stone, now roughly 2 cm from the left ureterovesicular junction. Similar mild left hydronephrosis.        2.  The prior smaller left ureteral stone is no longer seen.       3.  No new stones or bladder stones.                 I, Ashley Richardson, am serving as a scribe to document services personally performed by Jayde Winkler, based on my observation and the provider's statements to me. I, Jayde Winkler MD, attest that Ashley Richardson is acting in a scribe capacity, has observed my performance of the services and has documented them in accordance with my direction.    Jayde Winkler MD  Emergency Medicine  [facility] EMERGENCY ROOM  [id number] [facility] [id number] [phone]      Jayde Winkler MD  11/27/22 1561

## 2022-11-28 NOTE — ED NOTES
Refer to provider's note for full assessment. RN discharge pt from [facility]. Pt understanding of instructions and will  Rx on way home.

## 2022-11-29 ENCOUNTER — VIRTUAL VISIT (OUTPATIENT)
Dept: UROLOGY | Facility: CLINIC | Age: 47
End: 2022-11-29
Attending: EMERGENCY MEDICINE
Payer: COMMERCIAL

## 2022-11-29 DIAGNOSIS — N20.1 CALCULUS OF URETER: Primary | ICD-10-CM

## 2022-11-29 DIAGNOSIS — N13.2 HYDRONEPHROSIS WITH URINARY OBSTRUCTION DUE TO URETERAL CALCULUS: ICD-10-CM

## 2022-11-29 DIAGNOSIS — N20.0 CALCULUS OF KIDNEY: ICD-10-CM

## 2022-11-29 DIAGNOSIS — R10.9 ACUTE LEFT FLANK PAIN: ICD-10-CM

## 2022-11-29 DIAGNOSIS — N20.1 URETERAL STONE: ICD-10-CM

## 2022-11-29 PROCEDURE — 99203 OFFICE O/P NEW LOW 30 MIN: CPT | Mod: 95 | Performed by: PHYSICIAN ASSISTANT

## 2022-11-29 ASSESSMENT — PAIN SCALES - GENERAL: PAINLEVEL: MILD PAIN (3)

## 2022-11-29 NOTE — PROGRESS NOTES
Patient is roomed via telephone for a virtual visit.  Patient confirmed he is in the [facility] at the time of this appointment.  Patient understands that this visit is billable and agree to proceed with appointment.

## 2022-11-29 NOTE — PATIENT INSTRUCTIONS
Patient Stated Goal: Pass my stone  Symptom Control While Passing A Stone    The goal of [facility] is to let a smaller kidney stone (less than 4 to 5 mm) pass without intervention if possible. Giving your body a chance to clear the stone may take a few hours up to a few weeks.  Keeping you well-informed, safe and fairly comfortable is important.    Drink to thirst  Do not attempt to  flush out  your stone by drinking too much fluid. This does not work and may increase nausea. Drink enough to satisfy your body s thirst. Eating your normal diet is fine.   Medications (that may be suggested or prescribed)  Ibuprofen (Advil or Motrin) Available over the counter  Take two (200mg) tablets every six hours as needed.  Prevents spasm of the ureter.    Decreases pain.    Acetaminophen (Tylenol) Available over the counter  Take two (500mg) tablets every six hours as needed.  Prevents spasm of the ureter.    Decreases pain.    Dramamine* (drowsy version, non-generic formulation) Available over the counter  Take 50mg at bedtime  Decreases spasms of the ureter  Decreases nausea  Decreases acute pain  Decreases recurrence of pain for 24 hours  Will help you sleep  *This medication will cause increase drowsiness, do not drive or operate machinery for 6 hours.    Narcotics (Percocet, Vicodin, Dilaudid) Take as prescribed for severe pain unrelieved by ibuprofen and Dramamine  Narcotics have significant side effects and only  cover-up  pain. They have no effect on the cause of pain.  Common side effects  Confusion, disorientation and sedation - DO NOT DRIVE OR OPERATE MACHINERY WITHIN 24 HOURS  Nausea - take Dramamine or Zofran or Haldol to help control  Constipation  Sleep disturbances    Ondansetron (Zofran) Take as prescribed  Reserve for severe nausea  May cause constipation, start over the counter Miralax if needed    Second Line Anti-Nausea Medication: Adding a different anti-nausea medication maybe helpful for  persistent nausea.  The combined effect of different types of anti-nausea medications maybe more effective than either medication by itself, even in higher doses.  Compazine: Take as prescribed      Information about kidney stones  Crystals can form if chemicals are too concentrated in your urine. If the crystal grows over time, a stone may form. A stone usually isn t painful while it is still in the kidney.  As the stone begins to leave the kidney, you may experience episodes of flank pain as the kidney stone approaches the entrance to the ureter. Some people feel a vague ache in the side.  Kidney stones may fall into the ureter. Some stones are tiny and pass through without causing symptoms. The ureter is a small tube (approximately 1/8 of an inch wide). A kidney stone can get stuck and block the ureter. If this happens, urine backs up and flows back to the kidney. Back pressure on the kidney can cause:  Severe flank pain radiating to the groin.  Severe nausea and vomiting.  The pain can occur in the lower back, side, groin or all three.    When the stone reaches the lower ureter, this can irritate the bladder and sensations of feeling the urge to urinate frequently and urgently may occur.    Once the stone passes out of your ureter and into your bladder, the symptoms of urgency and frequency will often disappear. Sometimes pain will come back for a short period and will not be as severe as before. The passage of the stone from your bladder and out of your body is usually not a problem. The urethra is at least twice as wide as the normal ureter, so the stone doesn t usually block it.    Strain all urine  If you pass the stone, save it. Place it in the container we have provided and bring it to the [facility] within a week of passing it. Your stone will then be sent for analysis which takes about a month.     Signs and symptoms you might experience  Nausea  Decreased appetite  Urinary frequency  Bloody  urine   Chills  Fatigue    When to call [facility] or go to the Emergency Room  Fever with a temperature greater than 100.1  Severe pain  Persistent nausea/vomiting    If the pain worsens or nausea/vomiting is uncontrolled with medications, STOP eating & drinking. You need to have an empty stomach for 8 hours prior to surgery. Call the [facility] immediately at [phone].           Follow-up  Low dose CT scan with doctor visit 1-2 weeks after initial clinic visit per doctor s instructions    Please cancel the CT scan visit if you pass a stone. Reschedule for a one month follow-up with doctor to discuss stone composition and future prevention.    Preventing future stones    Approximately a month after your stone is sent out for analysis, a prevention visit will occur with your provider, to discuss an individualized plan for prevention of new stones and to discuss managing stones that you may still have. Along with the analysis of the kidney stone, blood and urine tests may be indicated to develop this plan. Knowing the type of kidney stones you make, and why, allows the providers at the [facility] to recommend specific ways to prevent them.    Follow-up visits are an important part of monitoring and preventing future re-occurrences.    The [facility] is available for questions or concerns 24 hours a day at [phone]

## 2022-11-30 ENCOUNTER — TELEPHONE (OUTPATIENT)
Dept: UROLOGY | Facility: CLINIC | Age: 47
End: 2022-11-30

## 2022-12-25 ENCOUNTER — HEALTH MAINTENANCE LETTER (OUTPATIENT)
Age: 47
End: 2022-12-25

## 2022-12-28 ENCOUNTER — TELEPHONE (OUTPATIENT)
Dept: UROLOGY | Facility: CLINIC | Age: 47
End: 2022-12-28

## 2022-12-29 ENCOUNTER — TELEPHONE (OUTPATIENT)
Dept: UROLOGY | Facility: CLINIC | Age: 47
End: 2022-12-29

## 2023-03-21 ENCOUNTER — LAB (OUTPATIENT)
Dept: LAB | Facility: CLINIC | Age: 48
End: 2023-03-21
Payer: COMMERCIAL

## 2023-03-21 DIAGNOSIS — E11.9 TYPE 2 DIABETES MELLITUS WITHOUT COMPLICATION, WITHOUT LONG-TERM CURRENT USE OF INSULIN (H): ICD-10-CM

## 2023-03-21 LAB
CHOLEST SERPL-MCNC: 181 MG/DL
HBA1C MFR BLD: 5.7 % (ref 0–5.6)
HDLC SERPL-MCNC: 31 MG/DL
LDLC SERPL CALC-MCNC: 73 MG/DL
NONHDLC SERPL-MCNC: 150 MG/DL
TRIGL SERPL-MCNC: 386 MG/DL

## 2023-03-21 PROCEDURE — 36415 COLL VENOUS BLD VENIPUNCTURE: CPT

## 2023-03-21 PROCEDURE — 80061 LIPID PANEL: CPT

## 2023-03-21 PROCEDURE — 83036 HEMOGLOBIN GLYCOSYLATED A1C: CPT

## 2023-03-21 NOTE — PROGRESS NOTES
Outcome for 03/21/23 9:09 AM: [employer] message sent  Taylor Myhre, RMA  Outcome for 03/24/23 3:12 PM: Left Voicemail   Tamera Cerrato MA  Outcome for 03/27/23 9:59 AM: Data obtained via phone and located below  Tamera Cerrato MA    3/11/23  9:30am: 96  6:00pm: 153    3/13/23  7:30pm: 90    3/14/23  6:22am: 149  1:15pm: 109    3/15/23  5:15am: 132  7:45am: 132    3/18/23  11:30am: 94  7:30pm: 130    3/20/23  5:40am: 108    3/21/23  5:45am: 118    3/23/23  5:50am: 118    3/24/23  6:00am: 114  10:30am: 110    3/26/23  6:22am: 131    3/27/23  8:00am: 109

## 2023-03-24 ENCOUNTER — TELEPHONE (OUTPATIENT)
Dept: ENDOCRINOLOGY | Facility: CLINIC | Age: 48
End: 2023-03-24
Payer: COMMERCIAL

## 2023-03-24 NOTE — TELEPHONE ENCOUNTER
Called patient and left voicemail. Patient has an appointment on 3/28/23. Need to collect the last 14 days worth of blood sugar readings to prepare for patient's visit.   Tamera Cerrato MA

## 2023-03-27 NOTE — TELEPHONE ENCOUNTER
3/11/23  9:30am: 96  6:00pm: 153    3/13/23  7:30pm: 90    3/14/23  6:22am: 149  1:15pm: 109    3/15/23  5:15am: 132  7:45am: 132    3/18/23  11:30am: 94  7:30pm: 130    3/20/23  5:40am: 108    3/21/23  5:45am: 118    3/23/23  5:50am: 118    3/24/23  6:00am: 114  10:30am: 110    3/26/23  6:22am: 131    3/27/23  8:00am: 109

## 2023-03-28 ENCOUNTER — VIRTUAL VISIT (OUTPATIENT)
Dept: ENDOCRINOLOGY | Facility: CLINIC | Age: 48
End: 2023-03-28
Payer: COMMERCIAL

## 2023-03-28 DIAGNOSIS — E78.2 MIXED HYPERLIPIDEMIA: ICD-10-CM

## 2023-03-28 DIAGNOSIS — E11.9 TYPE 2 DIABETES MELLITUS WITHOUT COMPLICATION, WITHOUT LONG-TERM CURRENT USE OF INSULIN (H): Primary | ICD-10-CM

## 2023-03-28 PROCEDURE — 99214 OFFICE O/P EST MOD 30 MIN: CPT | Mod: VID | Performed by: INTERNAL MEDICINE

## 2023-03-28 RX ORDER — FENOFIBRATE 48 MG/1
48 TABLET, COATED ORAL DAILY
Qty: 90 TABLET | Refills: 2 | Status: SHIPPED | OUTPATIENT
Start: 2023-03-28 | End: 2023-09-05

## 2023-03-28 ASSESSMENT — ENCOUNTER SYMPTOMS
CHILLS: 0
FATIGUE: 1
INCREASED ENERGY: 0
DECREASED APPETITE: 0
ALTERED TEMPERATURE REGULATION: 0
POLYPHAGIA: 0
HALLUCINATIONS: 0
NIGHT SWEATS: 0
POLYDIPSIA: 0
FEVER: 0
WEIGHT GAIN: 0
WEIGHT LOSS: 0

## 2023-03-28 NOTE — LETTER
"    3/28/2023         RE: Milan Carrillo  [address] [facility] [address]        Dear Colleague,    Thank you for referring your patient, Milan Carrillo, to the [facility]. Please see a copy of my visit note below.    Outcome for 03/21/23 9:09 AM: [address] message sent  Taylor Myhre, RMA  Outcome for 03/24/23 3:12 PM: Left Voicemail   Tamera Cerrato MA  Outcome for 03/27/23 9:59 AM: Data obtained via phone and located below  Tamera Cerrato MA    3/11/23  9:30am: 96  6:00pm: 153    3/13/23  7:30pm: 90    3/14/23  6:22am: 149  1:15pm: 109    3/15/23  5:15am: 132  7:45am: 132    3/18/23  11:30am: 94  7:30pm: 130    3/20/23  5:40am: 108    3/21/23  5:45am: 118    3/23/23  5:50am: 118    3/24/23  6:00am: 114  10:30am: 110    3/26/23  6:22am: 131    3/27/23  8:00am: 109        THIS IS A VIDEO VISIT:    Phone call visit/virtual visit encounter:    Name of patient: Milan Carrillo    Date of encounter: 3/28/2023    Time of start of video visit: 9:01    Video started: 9:07    Video ended: 9:16    Provider location: working from home/ [facility]    Patient location: patients home.    Mode of transmission: [employer] video/ [employer]    Verbal consent: obtained before starting visit. Pt is agreeable.      The patient has been notified of following:      \"This VIDEO visit will be conducted via a call between you and your physician/provider. We have found that certain health care needs can be provided without the need for a physical exam.  This service lets us provide the care you need with a short phone conversation.  If a prescription is necessary we can send it directly to your pharmacy.  If lab work is needed we can place an order for that and you can then stop by our lab to have the test done at a later time.     With new updates with corona virus patient might be billed as clinic visit.     If during the course of the call the physician/provider feels a telephone visit is not appropriate, " "you will not be charged for this service.\"      Past medical history, social history, family history, allergy and medications were reviewed and updated as appropriate.  Reviewed pertinent labs, notes, imaging studies personally.      ENDOCRINOLOGY CLINIC NOTE:  Name: Milan Carrillo  Seen for f/u of type 2 Diabetes.  HPI:  Milan Carrillo is a 48 year old male who presents for the evaluation/management of Diabetes.   has a past medical history of Arthritis, Hyperlipidemia, Hypertension, Kidney stone, and Sleep apnea.    Wt is stable.  Appetite is OK.  He never started pravastatin which was started in last visit.    1. Type 2 DM:  Orginally diagnosed at the age of: 46.  Current Regimen:   Metformin XR 1000 mg AM and 500 mg PM.  Ozempic 0.5 mg/week.    yes:     Diabetes Medication(s)     Biguanides       metFORMIN (GLUCOPHAGE XR) 500 MG 24 hr tablet    Take 2 tablets (1,000 mg) by mouth 2 times daily (with meals) Take 1000 mg in AM and 500 mg PM with food.    Incretin Mimetic Agents       semaglutide (OZEMPIC, 0.25 OR 0.5 MG/DOSE,) 2 MG/1.5ML SOPN pen    INJECT 0.5MG UNDER THE SKIN ONCE WEEKLY Strength: 2 MG/1.5ML      Not able to tolerate higher dose of regular metformin 2/2 to GI s/e.  Takes regular metformin.    BS checks: 1-2 times/day  Average Meter Download: see [email or web address] message  Exercise: limited. Feels fatigued.  Last A1c:   Symptoms of hypoglycemia (low blood sugar):  Gets symptoms of hypoglycemia.  Episodes of hypoglycemia:    DM Complications:   Complications:   Diabetes Complications  Description / Detail    Diabetic Retinopathy  No   CAD / PAD  No   Neuropathy  + Dm neuropathy   Nephropathy / Microalbuminuria  No  No results found for: UMALCR      Gastroparesis  No   Hypoglycemia [place]  No       2. Hypertension:    Not on medications.  3. Hyperlipidemia: Not on medications. Noted to have high TG.    PMH/PSH:  Past Medical History:   Diagnosis Date     Arthritis      Hyperlipidemia      Hypertension      " Kidney stone      Sleep apnea      Past Surgical History:   Procedure Laterality Date     ARTHROSCOPY SHOULDER ROTATOR CUFF REPAIR Right      ENT SURGERY       EYE SURGERY       HERNIA REPAIR       HERNIORRHAPHY UMBILICAL N/A 5/29/2020    Procedure: OPEN UMBILICAL HERNIA REPAIR WITH  MESH;  Surgeon: Curt Ramachandran MD;  Location:  OR     ORTHOPEDIC SURGERY      R shoulder     RECESSION RESECTION (REPAIR STRABISMUS)  10/1/2012    Procedure: RECESSION RESECTION (REPAIR STRABISMUS);  RIGHT STRABISMUS REPAIR;  Surgeon: Joanie Gurrola MD;  Location:  [facility]     RHINOPLASTY       STRABISMUS SURGERY       Family Hx:  Family History   Problem Relation Age of Onset     Glaucoma No family hx of      Macular Degeneration No family hx of      Pulmonary fibrosis Mother      No Known Problems Father      Hypertension Maternal Grandmother      Diabetes No family hx of      Cancer No family hx of        Diabetes:    Social Hx:  Social History     Socioeconomic History     Marital status:      Spouse name: Not on file     Number of children: Not on file     Years of education: Not on file     Highest education level: Not on file   Occupational History     Not on file   Tobacco Use     Smoking status: Never     Smokeless tobacco: Never   Substance and Sexual Activity     Alcohol use: Not Currently     Comment: Alcoholic Drinks/day: 2/month     Drug use: Never     Sexual activity: Not on file   Other Topics Concern     Parent/sibling w/ CABG, MI or angioplasty before 65F 55M? Not Asked   Social History Narrative     Not on file     Social Determinants of Health     Financial Resource Strain: Not on file   Food Insecurity: Not on file   Transportation Needs: Not on file   Physical Activity: Not on file   Stress: Not on file   Social Connections: Not on file   Intimate Partner Violence: Not on file   Housing Stability: Not on file          MEDICATIONS:  has a current medication list which includes the following  prescription(s): cinnamon, contour next test, metformin, pravastatin, red yeast rice extract, ozempic (0.25 or 0.5 mg/dose), sildenafil, and vitamin d2.    ROS     ROS: 10 point ROS neg other than the symptoms noted above in the HPI.    Physical Exam   VS: There were no vitals taken for this visit.  GENERAL: healthy, alert and no distress  EYES: Eyes grossly normal to inspection, conjunctivae and sclerae normal  ENT: no nose swelling, nasal discharge.  Thyroid: no apparent thyroid nodules  RESP: no audible wheeze, cough, or visible cyanosis.  No visible retractions or increased work of breathing.  Able to speak fully in complete sentences.  ABDO: not evaluated.  EXTREMITIES: no hand tremors.  NEURO: Cranial nerves grossly intact, mentation intact and speech normal  SKIN: No apparent skin lesions, rash or edema seen   PSYCH: mentation appears normal, affect normal/bright, judgement and insight intact, normal speech and appearance well-groomed    LABS:  A1c:  Lab Results   Component Value Date    A1C 5.7 03/21/2023    A1C 6.0 09/21/2022    A1C 6.0 06/09/2022    A1C 6.5 03/28/2022    A1C 6.3 12/28/2021    A1C 5.4 05/30/2017    A1C 5.7 02/24/2016    A1C 5.2 06/04/2010       Creatinine:  Creatinine   Date Value Ref Range Status   11/27/2022 1.14 0.70 - 1.30 mg/dL Final   04/15/2020 1.08 0.66 - 1.25 mg/dL Final   ]    Urine Micro:  Lab Results   Component Value Date    UMALCR 4.86 09/21/2022        LFTs/Lipids:  Recent Labs   Lab Test 03/21/23  0912 09/21/22  0810 06/07/21  1001 05/30/17  1703 02/24/16  1053   CHOL 181 197   < > 206.7* 230.2*   HDL 31* 33*   < > 35.1* 33.1*   LDL 73 116*   < > 124 158*   TRIG 386* 240*   < > 237.5* 197.9*   CHOLHDLRATIO  --   --   --  5.9* 7.0*    < > = values in this interval not displayed.     TFTs:  TSH   Date Value Ref Range Status   06/09/2022 3.04 0.30 - 5.00 uIU/mL Final   09/18/2015 2.52 0.40 - 4.00 mU/L Final       All pertinent notes, labs, and images personally reviewed by me.      Glucometer/ insulin pump (if applicable)/ CGM data (if applicable) downloaded, Personally reviewed and interpreted.  All Blood sugar data reviewed personally and discussed with pt.      A/P  Mr.David JODY Carrillo is a 48 year old here for the evaluation/management of diabetes:    1. DM2 - Under Good control. A1c 5.7%.   No known complications from DM.  In general BG in rage.  He was  interested in medication that will help with wt loss as well.  Plan:  Discussed diagnosis, pathophysiology, management and treatment options of condition with pt.  Continue metformin XR at current dose--take 1000 mg in the morning and take 500 mg at night  Increase Ozempic to 1.0 mg once a week-- it will be a new prescription.    Labs and follow-up in 6 months.    2. Hypertension - Under Good control.  Not on medication.    3. Hyperlipidemia - Under fair control.   LDL 73. Noted to have high triglyceride levels.  He was started on pravastatin but he never picked up the prescription.  For high triglyceride levels recommend to start fibrate--fibrate 48 mg/day.  Fasting labs in 6 months.  We will also check LFTs at that time.    4. Prevention:  Opthalmology-recommend annual  ASA-not indicated secondary to age  Smoking-no    Foot exam: 6/2022.    Most Recent Immunizations   Administered Date(s) Administered     COVID-19 Vaccine 12+ (Pfizer) 01/13/2021     Influenza (IIV3) PF 09/27/2012     Influenza (intradermal) 10/24/2012     Influenza Vaccine >6 months (Alfuria,Fluzone) 09/13/2021     TDAP (Adacel,Boostrix) 02/07/2019     TDAP Vaccine (Adacel) 02/07/2019     TDAP Vaccine (Boostrix) 09/27/2012         Recommend checking blood sugars before meals and at bedtime.    If Blood glucose are low more often-> 2-3 times/week- give us a call.  The patient is advised to Make better food choices: reduce carbs, Reduce portion size, weight loss and exercise 3-4 times a week.  Discussed hypoglycemia signs and symptoms as well as management in  detail.    There is some variability among people, most will usually develop symptoms suggestive of hypoglycemia when blood glucose levels are lowered to the mid 60's. The first set of symptoms are called adrenergic. Patients may experience any of the following nervousness, sweating, intense hunger, trembling, weakness, palpitations, and difficulty speaking.   The acute management of hypoglycemia involves the rapid delivery of a source of easily absorbed sugar. Regular soda, juice, lifesavers, table sugar, are good options. 15 grams of glucose is the dose that is given, followed by an assessment of symptoms and a blood glucose check if possible. If after 10 minutes there is no improvement, another 10-15 grams should be given. This can be repeated up to three times. The equivalency of 10-15 grams of glucose (approximate servings) are: Four lifesavers, 4 teaspoons of sugar, or 1/2 can of regular soda or juice.   Ozempic -- possible side effect profile of these class of medication includes: Nausea, diarrhea, gastrointestinal intolerance, abdominal pain, upper respiratory tract infection, headache, increased heart rate, drop in blood sugar, injection site reaction.  Rare side effects include pancreatitis, kidney problems.  It has been associated with thyroid cancer in animal models.    Possible side effects related to fibrates (like fenofibrate which are used to lower triglyceride levels) include increase in liver function test, skin reaction, abdominal pain, dizziness, muscle pain etc.    All questions were answered.  The patient indicates understanding of the above issues and agrees with the plan set forth.     Follow-up:  6 months    Lola Babcock M.D  Endocrinology  [facility]  CC: Sandor Sanford    Disclaimer: This note consists of symbols derived from keyboarding, dictation and/or voice recognition software. As a result, there may be errors in the script that have gone undetected. Please  consider this when interpreting information found in this chart.    Addendum to above note and clinic visit:    Labs reviewed.    See result note/telephone encounter.    Answers for HPI/ROS submitted by the patient on 3/28/2023  General Symptoms: Yes  Skin Symptoms: No  HENT Symptoms: No  EYE SYMPTOMS: No  HEART SYMPTOMS: No  LUNG SYMPTOMS: No  INTESTINAL SYMPTOMS: No  URINARY SYMPTOMS: No  REPRODUCTIVE SYMPTOMS: No  SKELETAL SYMPTOMS: No  BLOOD SYMPTOMS: No  NERVOUS SYSTEM SYMPTOMS: No  MENTAL HEALTH SYMPTOMS: No  Fever: No  Loss of appetite: No  Weight loss: No  Weight gain: No  Fatigue: Yes  Night sweats: No  Chills: No  Increased stress: No  Excessive hunger: No  Excessive thirst: No  Feeling hot or cold when others believe the temperature is normal: No  Loss of height: No  Post-operative complications: No  Surgical site pain: No  Hallucinations: No  Change in or Loss of Energy: No  Hyperactivity: No  Confusion: No          Again, thank you for allowing me to participate in the care of your patient.        Sincerely,        Lola Babcock MD

## 2023-03-28 NOTE — PATIENT INSTRUCTIONS
-[facility]  Dr Babcock, Endocrinology Department    [facility]   [address] [id number]  [place] [address]  Appointment [phone]  Fax: [phone]  [place]: Monday - Thursday      Continue metformin XR at current dose--take 1000 mg in the morning and take 500 mg at night  Increase Ozempic to 1.0 mg once a week-- it will be a new prescription. (You cannot use the pain that you already have at home as it will not titrate to 1.0 mg)  Start fenofibrate 48 mg/day. (1 tab/day)  Fasting labs in 6 months  Please make a lab appointment for blood work and follow up clinic appointment in 1 week after that to discuss results.    Ozempic-- possible side effect profile of these class of medication includes: Nausea, diarrhea, gastrointestinal intolerance, abdominal pain, upper respiratory tract infection, headache, increased heart rate, drop in blood sugar, injection site reaction.  Rare side effects include pancreatitis, kidney problems.  It has been associated with thyroid cancer in animal models.    Possible side effects related to fibrates (like fenofibrate which are used to lower triglyceride levels) include increase in liver function test, skin reaction, abdominal pain, dizziness, muscle pain etc.    Check Blood glucose fasting every day and before lunch/dinner/bedtime on alternate days.  If Blood glucose are low more often-> 2-3 times/week- give us a call.  The patient is advised to Make better food choices: reduce carbs, Reduce portion size, weight loss and exercise 3-4 times a week.  Discussed hypoglycemia signs and symptoms as well as management in detail.    What is hypoglycemia:  Hypoglycemia is when blood sugar levels become too low - below 70 m/dl.      What causes hypoglycemia?  - using too much insulin  -taking too many diabetes pills  -not eating enough, or skipping meals or snacks  -not eating enough carbohydrate with meals  -changing your exercise  routine  -drinking alcohol in excess    It is also possible to have hypoglycemia even when you are carefully managing your blood sugar levels.    What does it feel like when blood sugars get too low?  You may feel:  - anxious  -confused  -dizzy  -hungry  -light-headed  -nervous  -shaky  -sleepy  -sweaty    You may have  -blurred or cloudy vision  -heart palpitations (heart skips a beat or races)  -tingling or numbness around the mouth and tongue  -tremors    What to do if you have symptoms of hypoglycmemia:  If you think your blood sugar is too low, check it with a glucose meter.  If its below 70 mg/dl, consume one of the following:  Fruit juice (1/2 cup)  Glucose tablets (15 grams)  Hard candy (5 to 7 pieces)  Honey or sugar (2 teaspoons)  Milk (1/2 cup)  Soft drink (non-diet, 1/2 cup)    Wait 15 minutes and check your blood glucose again.  IF it is still below 70 mg/dl, have another food item listed above. Wait another 15 minutes and repeat the blood glucose test.  Have a small meal or snack that contains some carbohydrate after your blood glucose rises above 70 mg/dl.    If you are at risk of hypoglycemia, always carry with you glucose tablets or one of the foods listed above.      To prevent Hypoglycemia:  Avoid situations that may cause hypoglycemia  Before making any change to your diet or exercise routine, discuss them with your healthcare provider  Keep a record of your blood glucose levels.  Include the time of day, diabetes medications, when you had your last meal or snack, and what you were doing at the time (e.g. Watching TV, gardening, jogging, etc).    Talk to your healthcare provider if your blood glucose levels are often low        Patient guide on hypoglycemia    http://www.h[employer]one.org/Resources/upload/patient-guide-diagnosis-and-management-hypoglycemia-[email or web address]

## 2023-03-28 NOTE — PROGRESS NOTES
"THIS IS A VIDEO VISIT:    Phone call visit/virtual visit encounter:    Name of patient: Milan Carrillo    Date of encounter: 3/28/2023    Time of start of video visit: 9:01    Video started: 9:07    Video ended: 9:16    Provider location: working from home/ [facility]    Patient location: patients home.    Mode of transmission: MUBI video/ [employer]    Verbal consent: obtained before starting visit. Pt is agreeable.      The patient has been notified of following:      \"This VIDEO visit will be conducted via a call between you and your physician/provider. We have found that certain health care needs can be provided without the need for a physical exam.  This service lets us provide the care you need with a short phone conversation.  If a prescription is necessary we can send it directly to your pharmacy.  If lab work is needed we can place an order for that and you can then stop by our lab to have the test done at a later time.     With new updates with corona virus patient might be billed as clinic visit.     If during the course of the call the physician/provider feels a telephone visit is not appropriate, you will not be charged for this service.\"      Past medical history, social history, family history, allergy and medications were reviewed and updated as appropriate.  Reviewed pertinent labs, notes, imaging studies personally.      ENDOCRINOLOGY CLINIC NOTE:  Name: Milan Carrillo  Seen for f/u of type 2 Diabetes.  HPI:  Milan Carrillo is a 48 year old male who presents for the evaluation/management of Diabetes.   has a past medical history of Arthritis, Hyperlipidemia, Hypertension, Kidney stone, and Sleep apnea.    Wt is stable.  Appetite is OK.  He never started pravastatin which was started in last visit.    1. Type 2 DM:  Orginally diagnosed at the age of: 46.  Current Regimen:   Metformin XR 1000 mg AM and 500 mg PM.  Ozempic 0.5 mg/week.    yes:     Diabetes Medication(s)     Biguanides       " metFORMIN (GLUCOPHAGE XR) 500 MG 24 hr tablet    Take 2 tablets (1,000 mg) by mouth 2 times daily (with meals) Take 1000 mg in AM and 500 mg PM with food.    Incretin Mimetic Agents       semaglutide (OZEMPIC, 0.25 OR 0.5 MG/DOSE,) 2 MG/1.5ML SOPN pen    INJECT 0.5MG UNDER THE SKIN ONCE WEEKLY Strength: 2 MG/1.5ML      Not able to tolerate higher dose of regular metformin 2/2 to GI s/e.  Takes regular metformin.    BS checks: 1-2 times/day  Average Meter Download: see [employer] message  Exercise: limited. Feels fatigued.  Last A1c:   Symptoms of hypoglycemia (low blood sugar):  Gets symptoms of hypoglycemia.  Episodes of hypoglycemia:    DM Complications:   Complications:   Diabetes Complications  Description / Detail    Diabetic Retinopathy  No   CAD / PAD  No   Neuropathy  + Dm neuropathy   Nephropathy / Microalbuminuria  No  No results found for: UMALCR      Gastroparesis  No   Hypoglycemia [place]  No       2. Hypertension:    Not on medications.  3. Hyperlipidemia: Not on medications. Noted to have high TG.    PMH/PSH:  Past Medical History:   Diagnosis Date     Arthritis      Hyperlipidemia      Hypertension      Kidney stone      Sleep apnea      Past Surgical History:   Procedure Laterality Date     ARTHROSCOPY SHOULDER ROTATOR CUFF REPAIR Right      ENT SURGERY       EYE SURGERY       HERNIA REPAIR       HERNIORRHAPHY UMBILICAL N/A 5/29/2020    Procedure: OPEN UMBILICAL HERNIA REPAIR WITH  MESH;  Surgeon: Curt Ramachandran MD;  Location:  OR     ORTHOPEDIC SURGERY      R shoulder     RECESSION RESECTION (REPAIR STRABISMUS)  10/1/2012    Procedure: RECESSION RESECTION (REPAIR STRABISMUS);  RIGHT STRABISMUS REPAIR;  Surgeon: Joanie Gurrola MD;  Location:  [facility]     RHINOPLASTY       STRABISMUS SURGERY       Family Hx:  Family History   Problem Relation Age of Onset     Glaucoma No family hx of      Macular Degeneration No family hx of      Pulmonary fibrosis Mother      No Known Problems Father       Hypertension Maternal Grandmother      Diabetes No family hx of      Cancer No family hx of        Diabetes:    Social Hx:  Social History     Socioeconomic History     Marital status:      Spouse name: Not on file     Number of children: Not on file     Years of education: Not on file     Highest education level: Not on file   Occupational History     Not on file   Tobacco Use     Smoking status: Never     Smokeless tobacco: Never   Substance and Sexual Activity     Alcohol use: Not Currently     Comment: Alcoholic Drinks/day: 2/month     Drug use: Never     Sexual activity: Not on file   Other Topics Concern     Parent/sibling w/ CABG, MI or angioplasty before 65F 55M? Not Asked   Social History Narrative     Not on file     Social Determinants of Health     Financial Resource Strain: Not on file   Food Insecurity: Not on file   Transportation Needs: Not on file   Physical Activity: Not on file   Stress: Not on file   Social Connections: Not on file   Intimate Partner Violence: Not on file   Housing Stability: Not on file          MEDICATIONS:  has a current medication list which includes the following prescription(s): cinnamon, contour next test, metformin, pravastatin, red yeast rice extract, ozempic (0.25 or 0.5 mg/dose), sildenafil, and vitamin d2.    ROS     ROS: 10 point ROS neg other than the symptoms noted above in the HPI.    Physical Exam   VS: There were no vitals taken for this visit.  GENERAL: healthy, alert and no distress  EYES: Eyes grossly normal to inspection, conjunctivae and sclerae normal  ENT: no nose swelling, nasal discharge.  Thyroid: no apparent thyroid nodules  RESP: no audible wheeze, cough, or visible cyanosis.  No visible retractions or increased work of breathing.  Able to speak fully in complete sentences.  ABDO: not evaluated.  EXTREMITIES: no hand tremors.  NEURO: Cranial nerves grossly intact, mentation intact and speech normal  SKIN: No apparent skin lesions, rash or  edema seen   PSYCH: mentation appears normal, affect normal/bright, judgement and insight intact, normal speech and appearance well-groomed    LABS:  A1c:  Lab Results   Component Value Date    A1C 5.7 03/21/2023    A1C 6.0 09/21/2022    A1C 6.0 06/09/2022    A1C 6.5 03/28/2022    A1C 6.3 12/28/2021    A1C 5.4 05/30/2017    A1C 5.7 02/24/2016    A1C 5.2 06/04/2010       Creatinine:  Creatinine   Date Value Ref Range Status   11/27/2022 1.14 0.70 - 1.30 mg/dL Final   04/15/2020 1.08 0.66 - 1.25 mg/dL Final   ]    Urine Micro:  Lab Results   Component Value Date    UMALCR 4.86 09/21/2022        LFTs/Lipids:  Recent Labs   Lab Test 03/21/23  0912 09/21/22  0810 06/07/21  1001 05/30/17  1703 02/24/16  1053   CHOL 181 197   < > 206.7* 230.2*   HDL 31* 33*   < > 35.1* 33.1*   LDL 73 116*   < > 124 158*   TRIG 386* 240*   < > 237.5* 197.9*   CHOLHDLRATIO  --   --   --  5.9* 7.0*    < > = values in this interval not displayed.     TFTs:  TSH   Date Value Ref Range Status   06/09/2022 3.04 0.30 - 5.00 uIU/mL Final   09/18/2015 2.52 0.40 - 4.00 mU/L Final       All pertinent notes, labs, and images personally reviewed by me.     Glucometer/ insulin pump (if applicable)/ CGM data (if applicable) downloaded, Personally reviewed and interpreted.  All Blood sugar data reviewed personally and discussed with pt.      A/P  .Milan Carrillo is a 48 year old here for the evaluation/management of diabetes:    1. DM2 - Under Good control. A1c 5.7%.   No known complications from DM.  In general BG in rage.  He was  interested in medication that will help with wt loss as well.  Plan:  Discussed diagnosis, pathophysiology, management and treatment options of condition with pt.  Continue metformin XR at current dose--take 1000 mg in the morning and take 500 mg at night  Increase Ozempic to 1.0 mg once a week-- it will be a new prescription.    Labs and follow-up in 6 months.    2. Hypertension - Under Good control.  Not on  medication.    3. Hyperlipidemia - Under fair control.   LDL 73. Noted to have high triglyceride levels.  He was started on pravastatin but he never picked up the prescription.  For high triglyceride levels recommend to start fibrate--fibrate 48 mg/day.  Fasting labs in 6 months.  We will also check LFTs at that time.    4. Prevention:  Opthalmology-recommend annual  ASA-not indicated secondary to age  Smoking-no    Foot exam: 6/2022.    Most Recent Immunizations   Administered Date(s) Administered     COVID-19 Vaccine 12+ (Pfizer) 01/13/2021     Influenza (IIV3) PF 09/27/2012     Influenza (intradermal) 10/24/2012     Influenza Vaccine >6 months (Alfuria,Fluzone) 09/13/2021     TDAP (Adacel,Boostrix) 02/07/2019     TDAP Vaccine (Adacel) 02/07/2019     TDAP Vaccine (Boostrix) 09/27/2012         Recommend checking blood sugars before meals and at bedtime.    If Blood glucose are low more often-> 2-3 times/week- give us a call.  The patient is advised to Make better food choices: reduce carbs, Reduce portion size, weight loss and exercise 3-4 times a week.  Discussed hypoglycemia signs and symptoms as well as management in detail.    There is some variability among people, most will usually develop symptoms suggestive of hypoglycemia when blood glucose levels are lowered to the mid 60's. The first set of symptoms are called adrenergic. Patients may experience any of the following nervousness, sweating, intense hunger, trembling, weakness, palpitations, and difficulty speaking.   The acute management of hypoglycemia involves the rapid delivery of a source of easily absorbed sugar. Regular soda, juice, lifesavers, table sugar, are good options. 15 grams of glucose is the dose that is given, followed by an assessment of symptoms and a blood glucose check if possible. If after 10 minutes there is no improvement, another 10-15 grams should be given. This can be repeated up to three times. The equivalency of 10-15 grams of  glucose (approximate servings) are: Four lifesavers, 4 teaspoons of sugar, or 1/2 can of regular soda or juice.   Ozempic -- possible side effect profile of these class of medication includes: Nausea, diarrhea, gastrointestinal intolerance, abdominal pain, upper respiratory tract infection, headache, increased heart rate, drop in blood sugar, injection site reaction.  Rare side effects include pancreatitis, kidney problems.  It has been associated with thyroid cancer in animal models.    Possible side effects related to fibrates (like fenofibrate which are used to lower triglyceride levels) include increase in liver function test, skin reaction, abdominal pain, dizziness, muscle pain etc.    All questions were answered.  The patient indicates understanding of the above issues and agrees with the plan set forth.     Follow-up:  6 months    Lola Babcock M.D  Endocrinology  [facility]  CC: Sandor Sanford    Disclaimer: This note consists of symbols derived from keyboarding, dictation and/or voice recognition software. As a result, there may be errors in the script that have gone undetected. Please consider this when interpreting information found in this chart.    Addendum to above note and clinic visit:    Labs reviewed.    See result note/telephone encounter.    Answers for HPI/ROS submitted by the patient on 3/28/2023  General Symptoms: Yes  Skin Symptoms: No  HENT Symptoms: No  EYE SYMPTOMS: No  HEART SYMPTOMS: No  LUNG SYMPTOMS: No  INTESTINAL SYMPTOMS: No  URINARY SYMPTOMS: No  REPRODUCTIVE SYMPTOMS: No  SKELETAL SYMPTOMS: No  BLOOD SYMPTOMS: No  NERVOUS SYSTEM SYMPTOMS: No  MENTAL HEALTH SYMPTOMS: No  Fever: No  Loss of appetite: No  Weight loss: No  Weight gain: No  Fatigue: Yes  Night sweats: No  Chills: No  Increased stress: No  Excessive hunger: No  Excessive thirst: No  Feeling hot or cold when others believe the temperature is normal: No  Loss of height: No  Post-operative  complications: No  Surgical site pain: No  Hallucinations: No  Change in or Loss of Energy: No  Hyperactivity: No  Confusion: No

## 2023-03-28 NOTE — NURSING NOTE
Is the patient currently in the state of [place]? YES    Visit mode:VIDEO    If the visit is dropped, the patient can be reconnected by: TELEPHONE VISIT: Phone number: [phone]    Will anyone else be joining the visit? NO    How would you like to obtain your AVS? MyChart    Are changes needed to the allergy or medication list? NO    Reason for visit:   Chief Complaint   Patient presents with     Video Visit     Type 2 Diabetes       Rebecca Randall MA

## 2023-04-04 ENCOUNTER — TELEPHONE (OUTPATIENT)
Dept: ENDOCRINOLOGY | Facility: CLINIC | Age: 48
End: 2023-04-04
Payer: COMMERCIAL

## 2023-04-04 NOTE — TELEPHONE ENCOUNTER
LVM to schedule 6 month follow up with Dr. Babcock with labs 1 week prior to follow up     Rebecca Randall MA

## 2023-04-30 ENCOUNTER — HOSPITAL ENCOUNTER (EMERGENCY)
Facility: CLINIC | Age: 48
Discharge: HOME | End: 2023-04-30
Attending: EMERGENCY MEDICINE | Admitting: EMERGENCY MEDICINE
Payer: COMMERCIAL

## 2023-04-30 ENCOUNTER — APPOINTMENT (OUTPATIENT)
Dept: CT IMAGING | Facility: CLINIC | Age: 48
End: 2023-04-30
Attending: EMERGENCY MEDICINE
Payer: COMMERCIAL

## 2023-04-30 VITALS
BODY MASS INDEX: 35 KG/M2 | DIASTOLIC BLOOD PRESSURE: 90 MMHG | RESPIRATION RATE: 18 BRPM | WEIGHT: 250 LBS | SYSTOLIC BLOOD PRESSURE: 134 MMHG | OXYGEN SATURATION: 96 % | HEART RATE: 72 BPM | HEIGHT: 71 IN | TEMPERATURE: 97.7 F

## 2023-04-30 DIAGNOSIS — N20.1 URETEROLITHIASIS: ICD-10-CM

## 2023-04-30 LAB
ALBUMIN SERPL-MCNC: 4.1 G/DL (ref 3.5–5)
ALBUMIN UR-MCNC: 10 MG/DL
ALP SERPL-CCNC: 75 U/L (ref 45–120)
ALT SERPL W P-5'-P-CCNC: 52 U/L (ref 0–45)
ANION GAP SERPL CALCULATED.3IONS-SCNC: 11 MMOL/L (ref 5–18)
APPEARANCE UR: CLEAR
AST SERPL W P-5'-P-CCNC: 30 U/L (ref 0–40)
BASOPHILS # BLD AUTO: 0.1 10E3/UL (ref 0–0.2)
BASOPHILS NFR BLD AUTO: 1 %
BILIRUB SERPL-MCNC: 0.6 MG/DL (ref 0–1)
BILIRUB UR QL STRIP: NEGATIVE
BUN SERPL-MCNC: 13 MG/DL (ref 8–22)
C REACTIVE PROTEIN LHE: <0.1 MG/DL (ref 0–?)
CALCIUM SERPL-MCNC: 9.9 MG/DL (ref 8.5–10.5)
CHLORIDE BLD-SCNC: 108 MMOL/L (ref 98–107)
CO2 SERPL-SCNC: 22 MMOL/L (ref 22–31)
COLOR UR AUTO: ABNORMAL
CREAT SERPL-MCNC: 1.23 MG/DL (ref 0.7–1.3)
EOSINOPHIL # BLD AUTO: 0.2 10E3/UL (ref 0–0.7)
EOSINOPHIL NFR BLD AUTO: 3 %
ERYTHROCYTE [DISTWIDTH] IN BLOOD BY AUTOMATED COUNT: 13.1 % (ref 10–15)
GFR SERPL CREATININE-BSD FRML MDRD: 73 ML/MIN/1.73M2
GLUCOSE BLD-MCNC: 152 MG/DL (ref 70–125)
GLUCOSE UR STRIP-MCNC: NEGATIVE MG/DL
HCT VFR BLD AUTO: 47.1 % (ref 40–53)
HGB BLD-MCNC: 16.1 G/DL (ref 13.3–17.7)
HGB UR QL STRIP: ABNORMAL
IMM GRANULOCYTES # BLD: 0 10E3/UL
IMM GRANULOCYTES NFR BLD: 1 %
KETONES UR STRIP-MCNC: NEGATIVE MG/DL
LEUKOCYTE ESTERASE UR QL STRIP: NEGATIVE
LIPASE SERPL-CCNC: 37 U/L (ref 0–52)
LYMPHOCYTES # BLD AUTO: 2.6 10E3/UL (ref 0.8–5.3)
LYMPHOCYTES NFR BLD AUTO: 38 %
MCH RBC QN AUTO: 29.7 PG (ref 26.5–33)
MCHC RBC AUTO-ENTMCNC: 34.2 G/DL (ref 31.5–36.5)
MCV RBC AUTO: 87 FL (ref 78–100)
MONOCYTES # BLD AUTO: 0.6 10E3/UL (ref 0–1.3)
MONOCYTES NFR BLD AUTO: 9 %
MUCOUS THREADS #/AREA URNS LPF: PRESENT /LPF
NEUTROPHILS # BLD AUTO: 3.2 10E3/UL (ref 1.6–8.3)
NEUTROPHILS NFR BLD AUTO: 48 %
NITRATE UR QL: NEGATIVE
NRBC # BLD AUTO: 0 10E3/UL
NRBC BLD AUTO-RTO: 0 /100
PH UR STRIP: 5 [PH] (ref 5–7)
PLATELET # BLD AUTO: 294 10E3/UL (ref 150–450)
POTASSIUM BLD-SCNC: 3.9 MMOL/L (ref 3.5–5)
PROT SERPL-MCNC: 7.7 G/DL (ref 6–8)
RBC # BLD AUTO: 5.43 10E6/UL (ref 4.4–5.9)
RBC URINE: 86 /HPF
SODIUM SERPL-SCNC: 141 MMOL/L (ref 136–145)
SP GR UR STRIP: 1.02 (ref 1–1.03)
UROBILINOGEN UR STRIP-MCNC: <2 MG/DL
WBC # BLD AUTO: 6.6 10E3/UL (ref 4–11)
WBC URINE: 1 /HPF

## 2023-04-30 PROCEDURE — 250N000013 HC RX MED GY IP 250 OP 250 PS 637: Performed by: EMERGENCY MEDICINE

## 2023-04-30 PROCEDURE — 80053 COMPREHEN METABOLIC PANEL: CPT | Performed by: EMERGENCY MEDICINE

## 2023-04-30 PROCEDURE — 85025 COMPLETE CBC W/AUTO DIFF WBC: CPT | Performed by: EMERGENCY MEDICINE

## 2023-04-30 PROCEDURE — 74176 CT ABD & PELVIS W/O CONTRAST: CPT

## 2023-04-30 PROCEDURE — 81003 URINALYSIS AUTO W/O SCOPE: CPT | Performed by: EMERGENCY MEDICINE

## 2023-04-30 PROCEDURE — 99284 EMERGENCY DEPT VISIT MOD MDM: CPT | Mod: 25

## 2023-04-30 PROCEDURE — 86140 C-REACTIVE PROTEIN: CPT | Performed by: EMERGENCY MEDICINE

## 2023-04-30 PROCEDURE — 83690 ASSAY OF LIPASE: CPT | Performed by: EMERGENCY MEDICINE

## 2023-04-30 PROCEDURE — 36415 COLL VENOUS BLD VENIPUNCTURE: CPT | Performed by: EMERGENCY MEDICINE

## 2023-04-30 RX ORDER — OXYCODONE HYDROCHLORIDE 5 MG/1
5 TABLET ORAL EVERY 4 HOURS PRN
Qty: 12 TABLET | Refills: 0 | Status: SHIPPED | OUTPATIENT
Start: 2023-04-30 | End: 2023-05-04

## 2023-04-30 RX ORDER — ACETAMINOPHEN 500 MG
1000 TABLET ORAL EVERY 6 HOURS
Qty: 56 TABLET | Refills: 0 | Status: SHIPPED | OUTPATIENT
Start: 2023-04-30 | End: 2023-05-07

## 2023-04-30 RX ORDER — DIMENHYDRINATE 50 MG
50 TABLET ORAL AT BEDTIME
Qty: 7 TABLET | Refills: 0 | Status: SHIPPED | OUTPATIENT
Start: 2023-04-30 | End: 2023-05-07

## 2023-04-30 RX ORDER — ONDANSETRON 4 MG/1
4 TABLET, ORALLY DISINTEGRATING ORAL EVERY 8 HOURS PRN
Qty: 12 TABLET | Refills: 0 | Status: SHIPPED | OUTPATIENT
Start: 2023-04-30 | End: 2023-05-04

## 2023-04-30 RX ORDER — IBUPROFEN 200 MG
400 TABLET ORAL EVERY 6 HOURS
Qty: 56 TABLET | Refills: 0 | Status: SHIPPED | OUTPATIENT
Start: 2023-04-30 | End: 2023-05-07

## 2023-04-30 RX ORDER — DIMENHYDRINATE 50 MG
50 TABLET ORAL EVERY 6 HOURS PRN
Qty: 28 TABLET | Refills: 0 | Status: SHIPPED | OUTPATIENT
Start: 2023-04-30 | End: 2023-05-07

## 2023-04-30 RX ADMIN — Medication 50 MG: at 01:28

## 2023-04-30 NOTE — DISCHARGE INSTRUCTIONS
Follow-up with the urology doctors as soon as possible.  Strain your urine to try to collect the stone.  Can take Tylenol and ibuprofen for pain and for severe pain can take the oxycodone.  Can take the Dramamine as prescribed.  Can also use Zofran for nausea.    Return to the ER for any new or worsening concerns.

## 2023-04-30 NOTE — ED TRIAGE NOTES
Patient arrives to the RE with c/o R sided flank pain. Has a history of kidney stones. Took Ibuprofen about an hour ago when symptoms started.  Having difficulty urinating. Afebrile.      Triage Assessment     Row Name 04/30/23 0037       Triage Assessment (Adult)    Airway WDL WDL       Respiratory WDL    Respiratory WDL WDL       Skin Circulation/Temperature WDL    Skin Circulation/Temperature WDL WDL       Cardiac WDL    Cardiac WDL WDL       Peripheral/Neurovascular WDL    Peripheral Neurovascular WDL WDL       Cognitive/Neuro/Behavioral WDL    Cognitive/Neuro/Behavioral WDL WDL

## 2023-04-30 NOTE — ED PROVIDER NOTES
EMERGENCY DEPARTMENT ENCOUNTER      NAME: Milan Carrillo  AGE: 48 year old male  YOB: 1975  MRN: [id number]  EVALUATION DATE & TIME: No admission date for patient encounter.    PCP: Sandor Sanford    ED PROVIDER: Betty Fletcher MD      Chief Complaint   Patient presents with     Flank Pain         FINAL IMPRESSION:  1. Ureterolithiasis          ED COURSE & MEDICAL DECISION MAKING:    Pertinent Labs & Imaging studies reviewed. (See chart for details)    12:43 AM I introduced myself to the patient, obtained patient history, performed a physical exam, and discussed plan for ED workup including potential diagnostic laboratory/imaging studies and interventions.    48 year old male with history of kidney stones who presents to the Emergency Department for evaluation of right flank pain radiating into abdomen that began shortly prior to arrival. He reports on arrival that his pain has actually improved.  Reports it feels similar to his prior kidney stones.  On exam has mild tenderness in the right lower quadrant but no signs of peritonitis.  No CVA tenderness bilaterally.  He is in no acute distress on my evaluation.  Unfortunately is in the waiting room and thus can only do oral medications.  Did give him 50 mg of Dramamine.  Reports his pain has resolved at this time.  Differential diagnosis includes but is not limited to kidney stone, UTI, pyelonephritis, appendicitis, bowel obstruction, etc.  Laboratory studies obtained.  CT of the abdomen pelvis without contrast was obtained as well.    CMP is largely unremarkable.  CBC reveals white blood cell count of 6.6 with a hemoglobin of 16.1.  Urinalysis reveals 86 red blood cells with 1 white blood cell negative leukocyte esterase and negative nitrite.  No sign of UTI or pyelonephritis.  Do have high suspicion for kidney stone.  CRP is less than 0.1.  Lipase within normal limits.  Initial hypertension on arrival improved spontaneously.  Likely related to pain.   CT abdomen pelvis reveals A 2 mm stone at the right right ureterovesicular junction with minimal right hydroureter.  The impression says uteropelvic junction however in the body of the report it is at the ureterovesicular junction.  He has sigmoid diverticulosis without any diverticulitis.  Do feel that the kidney stone is likely the cause of his pain.  He has passed other spontaneously in the past.  At this point he is pain-free and nausea free.  We will prescribe him Tylenol, ibuprofen, and short course of oxycodone as needed for severe pain.  Also was prescribed Dramamine and Zofran.  Referred to the kidney stone [place] and instructed close follow-up with them.  Was given a strainer to strain his urine to collect the stone.  Given strict return precautions to the emergency department.  He voiced understanding and was comfortable with this plan.  He was discharged home in stable condition.         At the conclusion of the encounter I discussed the results of all of the tests and the disposition. The questions were answered. The patient or family acknowledged understanding and was agreeable with the care plan.       Medical Decision Making    History:    Supplemental history from: Documented in chart, if applicable    External Record(s) reviewed: Documented in chart, if applicable.    Work Up:    Chart documentation includes differential considered and any EKGs or imaging independently interpreted by provider.    In additional to work up documented, I considered the following work up: Documented in chart, if applicable.    External consultation:    Discussion of management with another provider: Documented in chart, if applicable    Complicating factors:    Care impacted by chronic illness: Diabetes, Hyperlipidemia and Hypertension    Care affected by social determinants of health: N/A    Disposition considerations: Discharge. I prescribed additional prescription strength medication(s) as charted. See  documentation for any additional details.      MEDICATIONS GIVEN IN THE EMERGENCY:  Medications   dimenhyDRINATE chew tab 50 mg (50 mg Oral $Given 4/30/23 0128)       NEW PRESCRIPTIONS STARTED AT TODAY'S ER VISIT  Discharge Medication List as of 4/30/2023  3:42 AM      START taking these medications    Details   acetaminophen (TYLENOL) 500 MG tablet Take 2 tablets (1,000 mg) by mouth every 6 hours for 7 days, Disp-56 tablet, R-0, E-Prescribe      !! dimenhyDRINATE (DRAMAMINE) 50 MG tablet Take 1 tablet (50 mg) by mouth At Bedtime for 7 days, Disp-7 tablet, R-0, E-Prescribe      !! dimenhyDRINATE (DRAMAMINE) 50 MG tablet Take 1 tablet (50 mg) by mouth every 6 hours as needed for other (kidney stone pain management), Disp-28 tablet, R-0, E-Prescribe      ibuprofen (ADVIL/MOTRIN) 200 MG tablet Take 2 tablets (400 mg) by mouth every 6 hours for 7 days, Disp-56 tablet, R-0, E-Prescribe      ondansetron (ZOFRAN ODT) 4 MG ODT tab Take 1 tablet (4 mg) by mouth every 8 hours as needed for nausea, Disp-12 tablet, R-0, E-Prescribe      oxyCODONE (ROXICODONE) 5 MG tablet Take 1 tablet (5 mg) by mouth every 4 hours as needed for severe pain If pain is not improved with acetaminophen and ibuprofen., Disp-12 tablet, R-0, Local Print       !! - Potential duplicate medications found. Please discuss with provider.             =================================================================    HPI    Patient information was obtained from: Patient    Milan Carrillo is a 48 year old male with a pertinent history of kidney stones, hyperlipidemia, hypertension, diabetes type 2, previous umbilical hernia repair who presents to this ED for evaluation of right flank pain radiating to his right abdomen that began shortly prior to arrival.  He reports this feels similar to prior kidney stones.  He states has been waxing and waning and currently his pain is actually improved on arrival to the ER.  Approximately an hour ago he took ibuprofen.   Reports he is having some stopping and starting of his urinary stream.  Denies any hematuria or dysuria.  Denies any fevers.  Denies any testicular swelling.  Does report the pain goes somewhat down into his right testicle which is similar to prior kidney stones as well.  No chest pain, shortness of breath, cough, diarrhea.  Does report he became nauseous but no vomiting.  He states that with previous kidney stones he has passed them all without intervention.    History of umbilical hernia repair.  No other abdominal surgeries.      REVIEW OF SYSTEMS   Review of Systems   Pertinent positives and negatives are documented in the HPI. All other systems reviewed and are negative.      PAST MEDICAL HISTORY:  Past Medical History:   Diagnosis Date     Arthritis      Hyperlipidemia      Hypertension      Kidney stone      Sleep apnea        PAST SURGICAL HISTORY:  Past Surgical History:   Procedure Laterality Date     ARTHROSCOPY SHOULDER ROTATOR CUFF REPAIR Right      ENT SURGERY       EYE SURGERY       HERNIA REPAIR       HERNIORRHAPHY UMBILICAL N/A 5/29/2020    Procedure: OPEN UMBILICAL HERNIA REPAIR WITH  MESH;  Surgeon: Curt Ramachandran MD;  Location:  OR     ORTHOPEDIC SURGERY      R shoulder     RECESSION RESECTION (REPAIR STRABISMUS)  10/1/2012    Procedure: RECESSION RESECTION (REPAIR STRABISMUS);  RIGHT STRABISMUS REPAIR;  Surgeon: Joanie Gurrola MD;  Location:  EC     RHINOPLASTY       STRABISMUS SURGERY             CURRENT MEDICATIONS:    acetaminophen (TYLENOL) 500 MG tablet  dimenhyDRINATE (DRAMAMINE) 50 MG tablet  dimenhyDRINATE (DRAMAMINE) 50 MG tablet  ibuprofen (ADVIL/MOTRIN) 200 MG tablet  ondansetron (ZOFRAN ODT) 4 MG ODT tab  oxyCODONE (ROXICODONE) 5 MG tablet  cinnamon 500 MG CAPS  CONTOUR NEXT TEST test strip  fenofibrate (TRICOR) 48 MG tablet  metFORMIN (GLUCOPHAGE XR) 500 MG 24 hr tablet  Red Yeast Rice Extract 600 MG CAPS  semaglutide (OZEMPIC, 0.25 OR 0.5 MG/DOSE,) 2 MG/1.5ML SOPN  "pen  Semaglutide, 1 MG/DOSE, (OZEMPIC) 4 MG/3ML pen  sildenafil (REVATIO) 20 MG tablet  vitamin D2 (ERGOCALCIFEROL) 67911 units (1250 mcg) capsule        ALLERGIES:  No Known Allergies    FAMILY HISTORY:  Family History   Problem Relation Age of Onset     Glaucoma No family hx of      Macular Degeneration No family hx of      Pulmonary fibrosis Mother      No Known Problems Father      Hypertension Maternal Grandmother      Diabetes No family hx of      Cancer No family hx of        SOCIAL HISTORY:   Social History     Socioeconomic History     Marital status:    Tobacco Use     Smoking status: Never     Smokeless tobacco: Never   Substance and Sexual Activity     Alcohol use: Not Currently     Comment: Alcoholic Drinks/day: 2/month     Drug use: Never       VITALS:  BP (!) 134/90   Pulse 72   Temp 97.7  F (36.5  C) (Temporal)   Resp 18   Ht 1.803 m (5' 11\")   Wt 113.4 kg (250 lb)   SpO2 96%   BMI 34.87 kg/m      PHYSICAL EXAM    Physical Exam  Constitutional: Well developed, Well nourished, NAD, GCS 15  HENT: Normocephalic, Atraumatic, Bilateral external ears normal, Oropharynx normal, mucous membranes moist, Nose normal. Neck- Normal range of motion, No tenderness, Supple, No stridor.   Eyes: PERRL, EOMI, Conjunctiva normal, No discharge.   Respiratory: Normal breath sounds, No respiratory distress, No wheezing or crackles, Speaks in full sentences easily.   Cardiovascular: Normal heart rate, Regular rhythm, No murmurs, No rubs, No gallops. 2+ radial pulses bilaterally  GI: Bowel sounds normal, Soft, Mild RLQ tenderness, No masses, No rebound or guarding. No CVA tenderness bilaterally.   Musculoskeletal: 2+ DP pulses. No notable lower extremity edema. Good range of motion in all major joints. No tenderness to palpation or major deformities noted. No tenderness of the TLS spine.    Integument: Warm, Dry, No erythema, No rash. No petechiae.   Neurologic: Alert & oriented x 3, 5/5 strength in all 4 " extremities bilaterally. Sensation intact to light touch in all 4 extremities and the face bilaterally. No focal deficits noted. Normal gait.    Psychiatric: Affect normal, Judgment normal, Mood normal. Cooperative.      LAB:  All pertinent labs reviewed and interpreted.  Results for orders placed or performed during the hospital encounter of 04/30/23   CT Abdomen Pelvis w/o Contrast    Impression    IMPRESSION:     1.  2 mm stone at the right ureteropelvic junction with minimal right hydroureter.    2.  1 mm nonobstructing right renal stone.    3.  Sigmoid diverticulosis without acute vasculitis.     UA with Microscopic reflex to Culture    Specimen: Urine, Clean Catch   Result Value Ref Range    Color Urine Light Yellow Colorless, Straw, Light Yellow, Yellow    Appearance Urine Clear Clear    Glucose Urine Negative Negative mg/dL    Bilirubin Urine Negative Negative    Ketones Urine Negative Negative mg/dL    Specific Gravity Urine 1.022 1.001 - 1.030    Blood Urine 0.5 mg/dL (A) Negative    pH Urine 5.0 5.0 - 7.0    Protein Albumin Urine 10 (A) Negative mg/dL    Urobilinogen Urine <2.0 <2.0 mg/dL    Nitrite Urine Negative Negative    Leukocyte Esterase Urine Negative Negative    Mucus Urine Present (A) None Seen /LPF    RBC Urine 86 (H) <=2 /HPF    WBC Urine 1 <=5 /HPF   Comprehensive metabolic panel   Result Value Ref Range    Sodium 141 136 - 145 mmol/L    Potassium 3.9 3.5 - 5.0 mmol/L    Chloride 108 (H) 98 - 107 mmol/L    Carbon Dioxide (CO2) 22 22 - 31 mmol/L    Anion Gap 11 5 - 18 mmol/L    Urea Nitrogen 13 8 - 22 mg/dL    Creatinine 1.23 0.70 - 1.30 mg/dL    Calcium 9.9 8.5 - 10.5 mg/dL    Glucose 152 (H) 70 - 125 mg/dL    Alkaline Phosphatase 75 45 - 120 U/L    AST 30 0 - 40 U/L    ALT 52 (H) 0 - 45 U/L    Protein Total 7.7 6.0 - 8.0 g/dL    Albumin 4.1 3.5 - 5.0 g/dL    Bilirubin Total 0.6 0.0 - 1.0 mg/dL    GFR Estimate 73 >60 mL/min/1.73m2   C-Reactive Protein   Result Value Ref Range    CRP <0.1 0.0  - <0.8 mg/dL   Result Value Ref Range    Lipase 37 0 - 52 U/L   CBC with platelets and differential   Result Value Ref Range    WBC Count 6.6 4.0 - 11.0 10e3/uL    RBC Count 5.43 4.40 - 5.90 10e6/uL    Hemoglobin 16.1 13.3 - 17.7 g/dL    Hematocrit 47.1 40.0 - 53.0 %    MCV 87 78 - 100 fL    MCH 29.7 26.5 - 33.0 pg    MCHC 34.2 31.5 - 36.5 g/dL    RDW 13.1 10.0 - 15.0 %    Platelet Count 294 150 - 450 10e3/uL    % Neutrophils 48 %    % Lymphocytes 38 %    % Monocytes 9 %    % Eosinophils 3 %    % Basophils 1 %    % Immature Granulocytes 1 %    NRBCs per 100 WBC 0 <1 /100    Absolute Neutrophils 3.2 1.6 - 8.3 10e3/uL    Absolute Lymphocytes 2.6 0.8 - 5.3 10e3/uL    Absolute Monocytes 0.6 0.0 - 1.3 10e3/uL    Absolute Eosinophils 0.2 0.0 - 0.7 10e3/uL    Absolute Basophils 0.1 0.0 - 0.2 10e3/uL    Absolute Immature Granulocytes 0.0 <=0.4 10e3/uL    Absolute NRBCs 0.0 10e3/uL       RADIOLOGY:  Reviewed all pertinent imaging. Please see official radiology report.  CT Abdomen Pelvis w/o Contrast   Final Result   IMPRESSION:       1.  2 mm stone at the right ureteropelvic junction with minimal right hydroureter.      2.  1 mm nonobstructing right renal stone.      3.  Sigmoid diverticulosis without acute vasculitis.                 PROCEDURES:   None      [facility] Documentation:   CMS Diagnoses:                 Betty Fletcher MD  [facility] EMERGENCY ROOM  [address] [id number] [phone]     Betty Fletcher MD  04/30/23 6581

## 2023-05-05 ENCOUNTER — PATIENT OUTREACH (OUTPATIENT)
Dept: FAMILY MEDICINE | Facility: CLINIC | Age: 48
End: 2023-05-05
Payer: COMMERCIAL

## 2023-05-05 NOTE — TELEPHONE ENCOUNTER
Patient does not have a PCP within [facility] at this time. Patient is seen by Endo and Urology.

## 2023-07-09 ENCOUNTER — HEALTH MAINTENANCE LETTER (OUTPATIENT)
Age: 48
End: 2023-07-09

## 2023-07-28 DIAGNOSIS — E11.9 TYPE 2 DIABETES MELLITUS WITHOUT COMPLICATION, WITHOUT LONG-TERM CURRENT USE OF INSULIN (H): ICD-10-CM

## 2023-07-28 NOTE — TELEPHONE ENCOUNTER
Per 3/28/23 OV:    Continue metformin XR at current dose--take 1000 mg in the morning and take 500 mg at night     Swyzzle message sent to verify dose.

## 2023-08-02 ENCOUNTER — TELEPHONE (OUTPATIENT)
Dept: ENDOCRINOLOGY | Facility: CLINIC | Age: 48
End: 2023-08-02
Payer: COMMERCIAL

## 2023-08-02 RX ORDER — METFORMIN HCL 500 MG
TABLET, EXTENDED RELEASE 24 HR ORAL
Qty: 160 TABLET | Refills: 0 | Status: SHIPPED | OUTPATIENT
Start: 2023-08-02 | End: 2023-09-05

## 2023-08-02 NOTE — TELEPHONE ENCOUNTER
[facility] Call Center    Phone Message    May a detailed message be left on voicemail: yes     Reason for Call: Other: Per pt would like to know if  can send the RX asap for metFORMIN (GLUCOPHAGE XR) 500 MG 24 hr tablet. Per pt is take his last pill today that the pharmacy gave him for an emergency. Please and thank you!     Action Taken: Message routed to:  [facility] ENDO    Travel Screening: Not Applicable

## 2023-08-28 NOTE — PROGRESS NOTES
Outcome for 08/28/23 12:15 PM: Mychart message sent  Rebecca Randall MA  Outcome for 08/31/23 2:00 PM: Left Voicemail   Tamera Cerrato MA  Outcome for 09/01/23 1:03 PM: Left Voicemail   Tamera Cerrato MA  Outcome for 09/05/23 10:59 AM: Data obtained via phone and located below; pt testing in the AM.   Betty Gong LPN     9/4  111    9/3  103    9/2  98    9/1  121    8/31  107    8/30  130    8/29  119    8/28  146    8/27  107

## 2023-08-29 ENCOUNTER — TELEPHONE (OUTPATIENT)
Dept: ENDOCRINOLOGY | Facility: CLINIC | Age: 48
End: 2023-08-29
Payer: COMMERCIAL

## 2023-08-29 NOTE — TELEPHONE ENCOUNTER
Pt is followed for DM by endocrine.  If testosterone labs are needed ( reason?) then he should check with PCP.

## 2023-08-29 NOTE — TELEPHONE ENCOUNTER
[facility] Call Center    Phone Message    May a detailed message be left on voicemail: yes     Reason for Call: Other: Milan is requesting to have orders for testosterone labs added to his current order for 8/30/23 please thank you     Action Taken: Other: Endo    Travel Screening: Not Applicable

## 2023-08-30 ENCOUNTER — LAB (OUTPATIENT)
Dept: LAB | Facility: CLINIC | Age: 48
End: 2023-08-30
Payer: COMMERCIAL

## 2023-08-30 DIAGNOSIS — E11.9 TYPE 2 DIABETES MELLITUS WITHOUT COMPLICATION, WITHOUT LONG-TERM CURRENT USE OF INSULIN (H): ICD-10-CM

## 2023-08-30 LAB
ALBUMIN SERPL BCG-MCNC: 4.5 G/DL (ref 3.5–5.2)
ALP SERPL-CCNC: 65 U/L (ref 40–129)
ALT SERPL W P-5'-P-CCNC: 44 U/L (ref 0–70)
AST SERPL W P-5'-P-CCNC: 38 U/L (ref 0–45)
BILIRUB DIRECT SERPL-MCNC: <0.2 MG/DL (ref 0–0.3)
BILIRUB SERPL-MCNC: 0.5 MG/DL
CHOLEST SERPL-MCNC: 184 MG/DL
HBA1C MFR BLD: 5.7 % (ref 0–5.6)
HDLC SERPL-MCNC: 36 MG/DL
LDLC SERPL CALC-MCNC: 118 MG/DL
NONHDLC SERPL-MCNC: 148 MG/DL
PROT SERPL-MCNC: 7.6 G/DL (ref 6.4–8.3)
TRIGL SERPL-MCNC: 148 MG/DL

## 2023-08-30 PROCEDURE — 83036 HEMOGLOBIN GLYCOSYLATED A1C: CPT

## 2023-08-30 PROCEDURE — 36415 COLL VENOUS BLD VENIPUNCTURE: CPT

## 2023-08-30 PROCEDURE — 80061 LIPID PANEL: CPT

## 2023-08-30 PROCEDURE — 80076 HEPATIC FUNCTION PANEL: CPT

## 2023-08-31 ENCOUNTER — TELEPHONE (OUTPATIENT)
Dept: ENDOCRINOLOGY | Facility: CLINIC | Age: 48
End: 2023-08-31
Payer: COMMERCIAL

## 2023-08-31 NOTE — TELEPHONE ENCOUNTER
Called patient and left voicemail. Patient has an appointment on  9/5/23 . Need to collect the last 14 days worth of blood sugar readings to prepare for patient's visit.   Tamera Cerrato MA

## 2023-09-05 ENCOUNTER — VIRTUAL VISIT (OUTPATIENT)
Dept: ENDOCRINOLOGY | Facility: CLINIC | Age: 48
End: 2023-09-05
Payer: COMMERCIAL

## 2023-09-05 VITALS — BODY MASS INDEX: 34.03 KG/M2 | WEIGHT: 244 LBS

## 2023-09-05 DIAGNOSIS — E11.9 TYPE 2 DIABETES MELLITUS WITHOUT COMPLICATION, WITHOUT LONG-TERM CURRENT USE OF INSULIN (H): Primary | ICD-10-CM

## 2023-09-05 PROCEDURE — 99214 OFFICE O/P EST MOD 30 MIN: CPT | Mod: VID | Performed by: INTERNAL MEDICINE

## 2023-09-05 RX ORDER — METFORMIN HCL 500 MG
1000 TABLET, EXTENDED RELEASE 24 HR ORAL 2 TIMES DAILY WITH MEALS
Qty: 360 TABLET | Refills: 1 | Status: SHIPPED | OUTPATIENT
Start: 2023-09-05 | End: 2024-03-05

## 2023-09-05 RX ORDER — FENOFIBRATE 48 MG/1
48 TABLET, COATED ORAL DAILY
Qty: 90 TABLET | Refills: 2 | Status: SHIPPED | OUTPATIENT
Start: 2023-09-05 | End: 2024-06-13

## 2023-09-05 ASSESSMENT — ENCOUNTER SYMPTOMS
MUSCLE CRAMPS: 1
DECREASED APPETITE: 0
DOUBLE VISION: 0
MUSCLE WEAKNESS: 0
WEIGHT GAIN: 0
FATIGUE: 1
HALLUCINATIONS: 0
POLYDIPSIA: 0
ARTHRALGIAS: 0
NIGHT SWEATS: 0
NECK PAIN: 0
POLYPHAGIA: 0
INCREASED ENERGY: 1
FEVER: 0
EYE REDNESS: 0
MYALGIAS: 0
BACK PAIN: 0
CHILLS: 0
JOINT SWELLING: 0
EYE WATERING: 0
STIFFNESS: 0
EYE IRRITATION: 0
ALTERED TEMPERATURE REGULATION: 0
WEIGHT LOSS: 0
EYE PAIN: 0

## 2023-09-05 ASSESSMENT — PAIN SCALES - GENERAL: PAINLEVEL: NO PAIN (0)

## 2023-09-05 NOTE — LETTER
"    9/5/2023         RE: Milan Carrillo  [address] [facility] [address]        Dear Colleague,    Thank you for referring your patient, Milan Carrillo, to the [facility]. Please see a copy of my visit note below.    Outcome for 08/28/23 12:15 PM: Gifts that Give message sent  Rebecca Randall MA  Outcome for 08/31/23 2:00 PM: Left Voicemail   Tamera Cerrato MA  Outcome for 09/01/23 1:03 PM: Left Voicemail   Tamera Cerrato MA  Outcome for 09/05/23 10:59 AM: Data obtained via phone and located below; pt testing in the AM.   Betty Gong LPN     9/4  111    9/3  103    9/2  98    9/1  121    8/31  107    8/30  130    8/29  119    8/28  146    8/27  107    THIS IS A VIDEO VISIT:    Phone call visit/virtual visit encounter:    Name of patient: Milan Carrillo    Date of encounter: 9/5/2023    Time of start of video visit: 11:01    Video started: 11:12    Video ended: 11:25    Provider location: working from home/ [facility]    Patient location: patients home.    Mode of transmission: [employer] video/ [employer]    Verbal consent: obtained before starting visit. Pt is agreeable.      The patient has been notified of following:      \"This VIDEO visit will be conducted via a call between you and your physician/provider. We have found that certain health care needs can be provided without the need for a physical exam.  This service lets us provide the care you need with a short phone conversation.  If a prescription is necessary we can send it directly to your pharmacy.  If lab work is needed we can place an order for that and you can then stop by our lab to have the test done at a later time.     With new updates with corona virus patient might be billed as clinic visit.     If during the course of the call the physician/provider feels a telephone visit is not appropriate, you will not be charged for this service.\"      Past medical history, social history, family history, allergy and " medications were reviewed and updated as appropriate.  Reviewed pertinent labs, notes, imaging studies personally.      ENDOCRINOLOGY CLINIC NOTE:  Name: Milan Carrillo  Seen for f/u of type 2 Diabetes.  HPI:  Milan Carrillo is a 48 year old male who presents for the evaluation/management of Diabetes.   has a past medical history of Arthritis, Hyperlipidemia, Hypertension, Kidney stone, and Sleep apnea.    Wt loss- 42 over last 2 years.  Appetite is OK.    Wt Readings from Last 2 Encounters:   09/05/23 110.7 kg (244 lb)   11/27/22 115.7 kg (255 lb)     He has questions about testosterone-- last 2022 check in range. Base don that he does not need to be on replacement.    1. Type 2 DM:  Orginally diagnosed at the age of: 46.  Current Regimen:   Metformin XR 1000 mg AM and 1000 mg PM.  Ozempic 1.0 mg/week.    yes:     Diabetes Medication(s)       Biguanides       metFORMIN (GLUCOPHAGE XR) 500 MG 24 hr tablet    TAKE TWO TABLETS BY MOUTH TWICE A DAY WITH MEALS      Incretin Mimetic Agents       Semaglutide, 1 MG/DOSE, (OZEMPIC) 4 MG/3ML pen    Inject 1 mg Subcutaneous every 7 days     semaglutide (OZEMPIC, 0.25 OR 0.5 MG/DOSE,) 2 MG/1.5ML SOPN pen    INJECT 0.5MG UNDER THE SKIN ONCE WEEKLY Strength: 2 MG/1.5ML        Not able to tolerate higher dose of regular metformin 2/2 to GI s/e. Tolerating XR well.  Takes regular metformin.    BS checks: 1-2 times/day  Average Meter Download: see [facility] [facility] message  Exercise: limited. Feels fatigued.  Last A1c:   Symptoms of hypoglycemia (low blood sugar):  Gets symptoms of hypoglycemia.  Episodes of hypoglycemia:    DM Complications:   Complications:   Diabetes Complications  Description / Detail    Diabetic Retinopathy  No   CAD / PAD  No   Neuropathy  + Dm neuropathy   Nephropathy / Microalbuminuria  No    Gastroparesis  No   Hypoglycemia [place]  No       2. Hypertension:    Not on medications.  3. Hyperlipidemia: Not on medications. Noted to have high TG.    PMH/PSH:  Past  Medical History:   Diagnosis Date     Arthritis      Hyperlipidemia      Hypertension      Kidney stone      Sleep apnea      Past Surgical History:   Procedure Laterality Date     ARTHROSCOPY SHOULDER ROTATOR CUFF REPAIR Right      ENT SURGERY       EYE SURGERY       HERNIA REPAIR       HERNIORRHAPHY UMBILICAL N/A 5/29/2020    Procedure: OPEN UMBILICAL HERNIA REPAIR WITH  MESH;  Surgeon: Curt Ramachandran MD;  Location:  OR     ORTHOPEDIC SURGERY      R shoulder     RECESSION RESECTION (REPAIR STRABISMUS)  10/1/2012    Procedure: RECESSION RESECTION (REPAIR STRABISMUS);  RIGHT STRABISMUS REPAIR;  Surgeon: Joanie Gurrola MD;  Location:  [facility]     RHINOPLASTY       STRABISMUS SURGERY       Family Hx:  Family History   Problem Relation Age of Onset     Glaucoma No family hx of      Macular Degeneration No family hx of      Pulmonary fibrosis Mother      No Known Problems Father      Hypertension Maternal Grandmother      Diabetes No family hx of      Cancer No family hx of        Diabetes:    Social Hx:  Social History     Socioeconomic History     Marital status:      Spouse name: Not on file     Number of children: Not on file     Years of education: Not on file     Highest education level: Not on file   Occupational History     Not on file   Tobacco Use     Smoking status: Never     Smokeless tobacco: Never   Substance and Sexual Activity     Alcohol use: Not Currently     Comment: Alcoholic Drinks/day: 2/month     Drug use: Never     Sexual activity: Not on file   Other Topics Concern     Parent/sibling w/ CABG, MI or angioplasty before 65F 55M? Not Asked   Social History Narrative     Not on file     Social Determinants of Health     Financial Resource Strain: Not on file   Food Insecurity: Not on file   Transportation Needs: Not on file   Physical Activity: Not on file   Stress: Not on file   Social Connections: Not on file   Intimate Partner Violence: Not on file   Housing Stability: Not on  file          MEDICATIONS:  has a current medication list which includes the following prescription(s): cinnamon, contour next test, fenofibrate, metformin, red yeast rice extract, semaglutide (1 mg/dose), sildenafil, vitamin d2, and ozempic (0.25 or 0.5 mg/dose).    ROS     ROS: 10 point ROS neg other than the symptoms noted above in the HPI.    Physical Exam   VS: Wt 110.7 kg (244 lb)   BMI 34.03 kg/m    GENERAL: healthy, alert and no distress  EYES: Eyes grossly normal to inspection, conjunctivae and sclerae normal  ENT: no nose swelling, nasal discharge.  Thyroid: no apparent thyroid nodules  RESP: no audible wheeze, cough, or visible cyanosis.  No visible retractions or increased work of breathing.  Able to speak fully in complete sentences.  ABDO: not evaluated.  EXTREMITIES: no hand tremors.  NEURO: Cranial nerves grossly intact, mentation intact and speech normal  SKIN: No apparent skin lesions, rash or edema seen   PSYCH: mentation appears normal, affect normal/bright, judgement and insight intact, normal speech and appearance well-groomed    LABS:  A1c:  Lab Results   Component Value Date    A1C 5.7 08/30/2023    A1C 5.7 03/21/2023    A1C 6.0 09/21/2022    A1C 6.0 06/09/2022    A1C 6.5 03/28/2022    A1C 5.4 05/30/2017    A1C 5.7 02/24/2016    A1C 5.2 06/04/2010     Creatinine:  Creatinine   Date Value Ref Range Status   04/30/2023 1.23 0.70 - 1.30 mg/dL Final   04/15/2020 1.08 0.66 - 1.25 mg/dL Final     Urine Micro:  Lab Results   Component Value Date    UMALCR 4.86 09/21/2022     LFTs/Lipids:  Recent Labs   Lab Test 08/30/23  0835 03/21/23  0912 06/07/21  1001 05/30/17  1703 02/24/16  1053   CHOL 184 181   < > 206.7* 230.2*   HDL 36* 31*   < > 35.1* 33.1*   * 73   < > 124 158*   TRIG 148 386*   < > 237.5* 197.9*   CHOLHDLRATIO  --   --   --  5.9* 7.0*    < > = values in this interval not displayed.     TFTs:  TSH   Date Value Ref Range Status   06/09/2022 3.04 0.30 - 5.00 uIU/mL Final    09/18/2015 2.52 0.40 - 4.00 mU/L Final       All pertinent notes, labs, and images personally reviewed by me.     Glucometer/ insulin pump (if applicable)/ CGM data (if applicable) downloaded, Personally reviewed and interpreted.  All Blood sugar data reviewed personally and discussed with pt.      A/P  Mr.David JODY Carrillo is a 48 year old here for the evaluation/management of diabetes:    1. DM2 - Under Good control. A1c 5.7%.   No known complications from DM.  In general BG in rage.  He was  interested in medication that will help with wt loss as well.  Plan:  Discussed diagnosis, pathophysiology, management and treatment options of condition with pt.  Continue Metformin XR 1000 mg AM and 1000 mg PM.  Continue Ozempic 1.0 mg/week.  Continue fenofibrate.  Fating labs in 6 months.  Please make a lab appointment for blood work and follow up clinic appointment in 1 week after that to discuss results.    2. Hypertension - Under Good control.  Not on medication.    3. Hyperlipidemia - Under fair control.    He was started on pravastatin but he never picked up the prescription.  For high triglyceride levels he is on fibrate 48 mg/day. Tg now normal.  Discussed starting stating- pravastatin but he would like to hold off and recheck in few months.  Fasting labs in 6 months.  Consider statin after that.    4. Prevention:  Opthalmology-recommend annual  ASA-not indicated secondary to age  Smoking-no    Foot exam: 6/2022.    Most Recent Immunizations   Administered Date(s) Administered     COVID-19 MONOVALENT 12+ (Pfizer) 01/13/2021     Influenza (IIV3) PF 09/27/2012     Influenza (intradermal) 10/24/2012     Influenza Vaccine >6 months (Alfuria,Fluzone) 09/13/2021     TDAP (Adacel,Boostrix) 02/07/2019     TDAP Vaccine (Adacel) 02/07/2019     TDAP Vaccine (Boostrix) 09/27/2012         Recommend checking blood sugars before meals and at bedtime.    If Blood glucose are low more often-> 2-3 times/week- give us a call.  The  patient is advised to Make better food choices: reduce carbs, Reduce portion size, weight loss and exercise 3-4 times a week.  Discussed hypoglycemia signs and symptoms as well as management in detail.    There is some variability among people, most will usually develop symptoms suggestive of hypoglycemia when blood glucose levels are lowered to the mid 60's. The first set of symptoms are called adrenergic. Patients may experience any of the following nervousness, sweating, intense hunger, trembling, weakness, palpitations, and difficulty speaking.   The acute management of hypoglycemia involves the rapid delivery of a source of easily absorbed sugar. Regular soda, juice, lifesavers, table sugar, are good options. 15 grams of glucose is the dose that is given, followed by an assessment of symptoms and a blood glucose check if possible. If after 10 minutes there is no improvement, another 10-15 grams should be given. This can be repeated up to three times. The equivalency of 10-15 grams of glucose (approximate servings) are: Four lifesavers, 4 teaspoons of sugar, or 1/2 can of regular soda or juice.   Ozempic -- possible side effect profile of these class of medication includes: Nausea, diarrhea, gastrointestinal intolerance, abdominal pain, upper respiratory tract infection, headache, increased heart rate, drop in blood sugar, injection site reaction.  Rare side effects include pancreatitis, kidney problems.  It has been associated with thyroid cancer in animal models.    Possible side effects related to fibrates (like fenofibrate which are used to lower triglyceride levels) include increase in liver function test, skin reaction, abdominal pain, dizziness, muscle pain etc.    All questions were answered.  The patient indicates understanding of the above issues and agrees with the plan set forth.     Follow-up:  6 months    Lola Babcock M.D  Endocrinology  [facility]/[place]  CC: Smith,  Sandor    Disclaimer: This note consists of symbols derived from keyboarding, dictation and/or voice recognition software. As a result, there may be errors in the script that have gone undetected. Please consider this when interpreting information found in this chart.    Addendum to above note and clinic visit:    Labs reviewed.    See result note/telephone encounter.Answers submitted by the patient for this visit:  Symptoms you have experienced in the last 30 days (Submitted on 9/5/2023)  General Symptoms: Yes  Skin Symptoms: No  HENT Symptoms: No  EYE SYMPTOMS: Yes  HEART SYMPTOMS: No  LUNG SYMPTOMS: No  INTESTINAL SYMPTOMS: No  URINARY SYMPTOMS: No  REPRODUCTIVE SYMPTOMS: Yes  SKELETAL SYMPTOMS: Yes  BLOOD SYMPTOMS: No  NERVOUS SYSTEM SYMPTOMS: No  MENTAL HEALTH SYMPTOMS: No  Please answer the questions below to tell us what conditions you are experiencing: (Submitted on 9/5/2023)  Fever: No  Loss of appetite: No  Weight loss: No  Weight gain: No  Fatigue: Yes  Night sweats: No  Chills: No  Increased stress: No  Excessive hunger: No  Excessive thirst: No  Feeling hot or cold when others believe the temperature is normal: No  Loss of height: No  Post-operative complications: No  Surgical site pain: No  Hallucinations: No  Change in or Loss of Energy: Yes  Hyperactivity: No  Confusion: No  Please answer the questions below to tell us what conditions you are experiencing: (Submitted on 9/5/2023)  Eye pain: No  Vision loss: Yes  Dry eyes: No  Watery eyes: No  Eye bulging: No  Double vision: No  Flashing of lights: No  Spots: No  Floaters: No  Redness: No  Crossed eyes: No  Tunnel Vision: No  Yellowing of eyes: No  Eye irritation: No  Please answer the questions below to tell us what condition you are experiencing: (Submitted on 9/5/2023)  Scrotal pain or swelling: No  Erectile dysfunction: No  Penile discharge: No  Genital ulcers: No  Reduced libido: Yes  Please answer the questions below to tell us what condition you  are experiencing: (Submitted on 9/5/2023)  Back pain: No  Muscle aches: No  Neck pain: No  Swollen joints: No  Joint pain: No  Bone pain: No  Muscle cramps: Yes  Muscle weakness: No  Joint stiffness: No  Bone fracture: No      Again, thank you for allowing me to participate in the care of your patient.        Sincerely,        Lola Babcock MD

## 2023-09-05 NOTE — PATIENT INSTRUCTIONS
[facility]  Dr Babcock, Endocrinology Department    [place] [address] [id number]  [place] [address]  Appointment [phone]  Fax: [phone]  [place]: Monday - Thursday      To provide the best diabetic care, please bring your blood glucose meter to each and every visit with your Endocrinologist.  Your blood glucose meter/insulin pump will be downloaded at every appointment.    Please arrive 15 minutes before your scheduled appointment.  This will allow for your blood glucose meter/insulin pump to be downloaded.  If you are wearing DEXCOM please bring  or sharing code so that it can be downloaded.  If you are using freestyle deya personal sensors please bring the reader.  If you are using TANDEM insulin pump please have your username and password to get info from [employer] website.    Continue Metformin XR 1000 mg AM and 1000 mg PM.  Continue Ozempic 1.0 mg/week.  Continue fenofibrate  Fating labs in 6 months  Please make a lab appointment for blood work and follow up clinic appointment in 1 week after that to discuss results.

## 2023-09-05 NOTE — PROGRESS NOTES
"THIS IS A VIDEO VISIT:    Phone call visit/virtual visit encounter:    Name of patient: Milan Carrillo    Date of encounter: 9/5/2023    Time of start of video visit: 11:01    Video started: 11:12    Video ended: 11:25    Provider location: working from home/ [facility]    Patient location: patients home.    Mode of transmission: DN2K video/ [employer]    Verbal consent: obtained before starting visit. Pt is agreeable.      The patient has been notified of following:      \"This VIDEO visit will be conducted via a call between you and your physician/provider. We have found that certain health care needs can be provided without the need for a physical exam.  This service lets us provide the care you need with a short phone conversation.  If a prescription is necessary we can send it directly to your pharmacy.  If lab work is needed we can place an order for that and you can then stop by our lab to have the test done at a later time.     With new updates with corona virus patient might be billed as clinic visit.     If during the course of the call the physician/provider feels a telephone visit is not appropriate, you will not be charged for this service.\"      Past medical history, social history, family history, allergy and medications were reviewed and updated as appropriate.  Reviewed pertinent labs, notes, imaging studies personally.      ENDOCRINOLOGY CLINIC NOTE:  Name: Milan Carrillo  Seen for f/u of type 2 Diabetes.  HPI:  Milan Carrillo is a 48 year old male who presents for the evaluation/management of Diabetes.   has a past medical history of Arthritis, Hyperlipidemia, Hypertension, Kidney stone, and Sleep apnea.    Wt loss- 42 over last 2 years.  Appetite is OK.    Wt Readings from Last 2 Encounters:   09/05/23 110.7 kg (244 lb)   11/27/22 115.7 kg (255 lb)     He has questions about testosterone-- last 2022 check in range. Base don that he does not need to be on replacement.    1. Type 2 " DM:  Orginally diagnosed at the age of: 46.  Current Regimen:   Metformin XR 1000 mg AM and 1000 mg PM.  Ozempic 1.0 mg/week.    yes:     Diabetes Medication(s)       Biguanides       metFORMIN (GLUCOPHAGE XR) 500 MG 24 hr tablet    TAKE TWO TABLETS BY MOUTH TWICE A DAY WITH MEALS      Incretin Mimetic Agents       Semaglutide, 1 MG/DOSE, (OZEMPIC) 4 MG/3ML pen    Inject 1 mg Subcutaneous every 7 days     semaglutide (OZEMPIC, 0.25 OR 0.5 MG/DOSE,) 2 MG/1.5ML SOPN pen    INJECT 0.5MG UNDER THE SKIN ONCE WEEKLY Strength: 2 MG/1.5ML        Not able to tolerate higher dose of regular metformin 2/2 to GI s/e. Tolerating XR well.  Takes regular metformin.    BS checks: 1-2 times/day  Average Meter Download: see [employer] message  Exercise: limited. Feels fatigued.  Last A1c:   Symptoms of hypoglycemia (low blood sugar):  Gets symptoms of hypoglycemia.  Episodes of hypoglycemia:    DM Complications:   Complications:   Diabetes Complications  Description / Detail    Diabetic Retinopathy  No   CAD / PAD  No   Neuropathy  + Dm neuropathy   Nephropathy / Microalbuminuria  No    Gastroparesis  No   Hypoglycemia [place]  No       2. Hypertension:    Not on medications.  3. Hyperlipidemia: Not on medications. Noted to have high TG.    PMH/PSH:  Past Medical History:   Diagnosis Date    Arthritis     Hyperlipidemia     Hypertension     Kidney stone     Sleep apnea      Past Surgical History:   Procedure Laterality Date    ARTHROSCOPY SHOULDER ROTATOR CUFF REPAIR Right     ENT SURGERY      EYE SURGERY      HERNIA REPAIR      HERNIORRHAPHY UMBILICAL N/A 5/29/2020    Procedure: OPEN UMBILICAL HERNIA REPAIR WITH  MESH;  Surgeon: Curt Ramachandran MD;  Location:  OR    ORTHOPEDIC SURGERY      R shoulder    RECESSION RESECTION (REPAIR STRABISMUS)  10/1/2012    Procedure: RECESSION RESECTION (REPAIR STRABISMUS);  RIGHT STRABISMUS REPAIR;  Surgeon: Joanie Gurrola MD;  Location:  [facility]    RHINOPLASTY      STRABISMUS SURGERY        Family Hx:  Family History   Problem Relation Age of Onset    Glaucoma No family hx of     Macular Degeneration No family hx of     Pulmonary fibrosis Mother     No Known Problems Father     Hypertension Maternal Grandmother     Diabetes No family hx of     Cancer No family hx of        Diabetes:    Social Hx:  Social History     Socioeconomic History    Marital status:      Spouse name: Not on file    Number of children: Not on file    Years of education: Not on file    Highest education level: Not on file   Occupational History    Not on file   Tobacco Use    Smoking status: Never    Smokeless tobacco: Never   Substance and Sexual Activity    Alcohol use: Not Currently     Comment: Alcoholic Drinks/day: 2/month    Drug use: Never    Sexual activity: Not on file   Other Topics Concern    Parent/sibling w/ CABG, MI or angioplasty before 65F 55M? Not Asked   Social History Narrative    Not on file     Social Determinants of Health     Financial Resource Strain: Not on file   Food Insecurity: Not on file   Transportation Needs: Not on file   Physical Activity: Not on file   Stress: Not on file   Social Connections: Not on file   Intimate Partner Violence: Not on file   Housing Stability: Not on file          MEDICATIONS:  has a current medication list which includes the following prescription(s): cinnamon, contour next test, fenofibrate, metformin, red yeast rice extract, semaglutide (1 mg/dose), sildenafil, vitamin d2, and ozempic (0.25 or 0.5 mg/dose).    ROS     ROS: 10 point ROS neg other than the symptoms noted above in the HPI.    Physical Exam   VS: Wt 110.7 kg (244 lb)   BMI 34.03 kg/m    GENERAL: healthy, alert and no distress  EYES: Eyes grossly normal to inspection, conjunctivae and sclerae normal  ENT: no nose swelling, nasal discharge.  Thyroid: no apparent thyroid nodules  RESP: no audible wheeze, cough, or visible cyanosis.  No visible retractions or increased work of breathing.  Able to  speak fully in complete sentences.  ABDO: not evaluated.  EXTREMITIES: no hand tremors.  NEURO: Cranial nerves grossly intact, mentation intact and speech normal  SKIN: No apparent skin lesions, rash or edema seen   PSYCH: mentation appears normal, affect normal/bright, judgement and insight intact, normal speech and appearance well-groomed    LABS:  A1c:  Lab Results   Component Value Date    A1C 5.7 08/30/2023    A1C 5.7 03/21/2023    A1C 6.0 09/21/2022    A1C 6.0 06/09/2022    A1C 6.5 03/28/2022    A1C 5.4 05/30/2017    A1C 5.7 02/24/2016    A1C 5.2 06/04/2010     Creatinine:  Creatinine   Date Value Ref Range Status   04/30/2023 1.23 0.70 - 1.30 mg/dL Final   04/15/2020 1.08 0.66 - 1.25 mg/dL Final     Urine Micro:  Lab Results   Component Value Date    UMALCR 4.86 09/21/2022     LFTs/Lipids:  Recent Labs   Lab Test 08/30/23  0835 03/21/23  0912 06/07/21  1001 05/30/17  1703 02/24/16  1053   CHOL 184 181   < > 206.7* 230.2*   HDL 36* 31*   < > 35.1* 33.1*   * 73   < > 124 158*   TRIG 148 386*   < > 237.5* 197.9*   CHOLHDLRATIO  --   --   --  5.9* 7.0*    < > = values in this interval not displayed.     TFTs:  TSH   Date Value Ref Range Status   06/09/2022 3.04 0.30 - 5.00 uIU/mL Final   09/18/2015 2.52 0.40 - 4.00 mU/L Final       All pertinent notes, labs, and images personally reviewed by me.     Glucometer/ insulin pump (if applicable)/ CGM data (if applicable) downloaded, Personally reviewed and interpreted.  All Blood sugar data reviewed personally and discussed with pt.      A/P  Mr.David JODY Carrillo is a 48 year old here for the evaluation/management of diabetes:    1. DM2 - Under Good control. A1c 5.7%.   No known complications from DM.  In general BG in rage.  He was  interested in medication that will help with wt loss as well.  Plan:  Discussed diagnosis, pathophysiology, management and treatment options of condition with pt.  Continue Metformin XR 1000 mg AM and 1000 mg PM.  Continue Ozempic 1.0  mg/week.  Continue fenofibrate.  Fating labs in 6 months.  Please make a lab appointment for blood work and follow up clinic appointment in 1 week after that to discuss results.    2. Hypertension - Under Good control.  Not on medication.    3. Hyperlipidemia - Under fair control.    He was started on pravastatin but he never picked up the prescription.  For high triglyceride levels he is on fibrate 48 mg/day. Tg now normal.  Discussed starting stating- pravastatin but he would like to hold off and recheck in few months.  Fasting labs in 6 months.  Consider statin after that.    4. Prevention:  Opthalmology-recommend annual  ASA-not indicated secondary to age  Smoking-no    Foot exam: 6/2022.    Most Recent Immunizations   Administered Date(s) Administered    COVID-19 MONOVALENT 12+ (Pfizer) 01/13/2021    Influenza (IIV3) PF 09/27/2012    Influenza (intradermal) 10/24/2012    Influenza Vaccine >6 months (Alfuria,Fluzone) 09/13/2021    TDAP (Adacel,Boostrix) 02/07/2019    TDAP Vaccine (Adacel) 02/07/2019    TDAP Vaccine (Boostrix) 09/27/2012         Recommend checking blood sugars before meals and at bedtime.    If Blood glucose are low more often-> 2-3 times/week- give us a call.  The patient is advised to Make better food choices: reduce carbs, Reduce portion size, weight loss and exercise 3-4 times a week.  Discussed hypoglycemia signs and symptoms as well as management in detail.    There is some variability among people, most will usually develop symptoms suggestive of hypoglycemia when blood glucose levels are lowered to the mid 60's. The first set of symptoms are called adrenergic. Patients may experience any of the following nervousness, sweating, intense hunger, trembling, weakness, palpitations, and difficulty speaking.   The acute management of hypoglycemia involves the rapid delivery of a source of easily absorbed sugar. Regular soda, juice, lifesavers, table sugar, are good options. 15 grams of glucose  is the dose that is given, followed by an assessment of symptoms and a blood glucose check if possible. If after 10 minutes there is no improvement, another 10-15 grams should be given. This can be repeated up to three times. The equivalency of 10-15 grams of glucose (approximate servings) are: Four lifesavers, 4 teaspoons of sugar, or 1/2 can of regular soda or juice.   Ozempic -- possible side effect profile of these class of medication includes: Nausea, diarrhea, gastrointestinal intolerance, abdominal pain, upper respiratory tract infection, headache, increased heart rate, drop in blood sugar, injection site reaction.  Rare side effects include pancreatitis, kidney problems.  It has been associated with thyroid cancer in animal models.    Possible side effects related to fibrates (like fenofibrate which are used to lower triglyceride levels) include increase in liver function test, skin reaction, abdominal pain, dizziness, muscle pain etc.    All questions were answered.  The patient indicates understanding of the above issues and agrees with the plan set forth.     Follow-up:  6 months    Lola Babcock M.D  Endocrinology  [facility]  CC: Sandor Sanford    Disclaimer: This note consists of symbols derived from keyboarding, dictation and/or voice recognition software. As a result, there may be errors in the script that have gone undetected. Please consider this when interpreting information found in this chart.    Addendum to above note and clinic visit:    Labs reviewed.    See result note/telephone encounter.Answers submitted by the patient for this visit:  Symptoms you have experienced in the last 30 days (Submitted on 9/5/2023)  General Symptoms: Yes  Skin Symptoms: No  HENT Symptoms: No  EYE SYMPTOMS: Yes  HEART SYMPTOMS: No  LUNG SYMPTOMS: No  INTESTINAL SYMPTOMS: No  URINARY SYMPTOMS: No  REPRODUCTIVE SYMPTOMS: Yes  SKELETAL SYMPTOMS: Yes  BLOOD SYMPTOMS: No  NERVOUS SYSTEM SYMPTOMS:  No  MENTAL HEALTH SYMPTOMS: No  Please answer the questions below to tell us what conditions you are experiencing: (Submitted on 9/5/2023)  Fever: No  Loss of appetite: No  Weight loss: No  Weight gain: No  Fatigue: Yes  Night sweats: No  Chills: No  Increased stress: No  Excessive hunger: No  Excessive thirst: No  Feeling hot or cold when others believe the temperature is normal: No  Loss of height: No  Post-operative complications: No  Surgical site pain: No  Hallucinations: No  Change in or Loss of Energy: Yes  Hyperactivity: No  Confusion: No  Please answer the questions below to tell us what conditions you are experiencing: (Submitted on 9/5/2023)  Eye pain: No  Vision loss: Yes  Dry eyes: No  Watery eyes: No  Eye bulging: No  Double vision: No  Flashing of lights: No  Spots: No  Floaters: No  Redness: No  Crossed eyes: No  Tunnel Vision: No  Yellowing of eyes: No  Eye irritation: No  Please answer the questions below to tell us what condition you are experiencing: (Submitted on 9/5/2023)  Scrotal pain or swelling: No  Erectile dysfunction: No  Penile discharge: No  Genital ulcers: No  Reduced libido: Yes  Please answer the questions below to tell us what condition you are experiencing: (Submitted on 9/5/2023)  Back pain: No  Muscle aches: No  Neck pain: No  Swollen joints: No  Joint pain: No  Bone pain: No  Muscle cramps: Yes  Muscle weakness: No  Joint stiffness: No  Bone fracture: No

## 2023-09-05 NOTE — NURSING NOTE
Is the patient currently in the state of [place]? YES    Visit mode:VIDEO    If the visit is dropped, the patient can be reconnected by: VIDEO VISIT: Text to cell phone:   Telephone Information:   Mobile [phone]       Will anyone else be joining the visit? NO  (If patient encounters technical issues they should call [phone]    How would you like to obtain your AVS? MyChart    Are changes needed to the allergy or medication list? Pt stated no changes to allergies and Pt stated no med changes    Reason for visit: Follow Up    Betty Gong LPN LPN

## 2023-09-17 ENCOUNTER — HEALTH MAINTENANCE LETTER (OUTPATIENT)
Age: 48
End: 2023-09-17

## 2024-02-04 ENCOUNTER — HEALTH MAINTENANCE LETTER (OUTPATIENT)
Age: 49
End: 2024-02-04

## 2024-02-23 NOTE — PROGRESS NOTES
Outcome for 02/23/24 8:10 AM: [employer] message sent  Tamera Cerrato MA  Outcome for 03/01/24 11:10 AM: Left Voicemail   Betty Gong LPN   Outcome for 03/04/24 12:20 PM: Data obtained via phone and located below  Tamera Cerrato MA    2/19/24  6:00am: 109    2/20/24  6:00am: 109    2/21/24  6:00am: 104    2/22/24  6:00am: 105    2/23/24  6:00am: 93    2/24/24  6:00am: 99    2/25/24  6:00am: 114    2/26/24  6:00am: 127    2/27/24  6:00am: 95    2/28/24  6:00am: 120    2/29/24  6:00am: 132    3/1/24  6:00am: 123    3/2/24  6:00am: 114    3/3/24  6:00am: 99

## 2024-02-26 ENCOUNTER — LAB (OUTPATIENT)
Dept: LAB | Facility: CLINIC | Age: 49
End: 2024-02-26
Payer: COMMERCIAL

## 2024-02-26 DIAGNOSIS — E11.9 TYPE 2 DIABETES MELLITUS WITHOUT COMPLICATION, WITHOUT LONG-TERM CURRENT USE OF INSULIN (H): ICD-10-CM

## 2024-02-26 LAB
CHOLEST SERPL-MCNC: 159 MG/DL
CREAT SERPL-MCNC: 1.39 MG/DL (ref 0.67–1.17)
CREAT UR-MCNC: 183 MG/DL
EGFRCR SERPLBLD CKD-EPI 2021: 63 ML/MIN/1.73M2
FASTING STATUS PATIENT QL REPORTED: YES
HBA1C MFR BLD: 5.4 % (ref 0–5.6)
HDLC SERPL-MCNC: 33 MG/DL
LDLC SERPL CALC-MCNC: 96 MG/DL
MICROALBUMIN UR-MCNC: <12 MG/L
MICROALBUMIN/CREAT UR: NORMAL MG/G{CREAT}
NONHDLC SERPL-MCNC: 126 MG/DL
TRIGL SERPL-MCNC: 152 MG/DL

## 2024-02-26 PROCEDURE — 82570 ASSAY OF URINE CREATININE: CPT

## 2024-02-26 PROCEDURE — 83036 HEMOGLOBIN GLYCOSYLATED A1C: CPT

## 2024-02-26 PROCEDURE — 82043 UR ALBUMIN QUANTITATIVE: CPT

## 2024-02-26 PROCEDURE — 36415 COLL VENOUS BLD VENIPUNCTURE: CPT

## 2024-02-26 PROCEDURE — 82565 ASSAY OF CREATININE: CPT

## 2024-02-26 PROCEDURE — 80061 LIPID PANEL: CPT

## 2024-03-01 ENCOUNTER — TELEPHONE (OUTPATIENT)
Dept: ENDOCRINOLOGY | Facility: CLINIC | Age: 49
End: 2024-03-01
Payer: COMMERCIAL

## 2024-03-01 NOTE — TELEPHONE ENCOUNTER
Called patient and left voicemail. Patient has an appointment on 3/5/24. Need to collect the last 14 days worth of blood sugar readings to prepare for patient's visit.     Betty Gong LPN 03/01/24 11:10 AM

## 2024-03-04 NOTE — TELEPHONE ENCOUNTER
2/19/24  6:00am: 109    2/20/24  6:00am: 109    2/21/24  6:00am: 104    2/22/24  6:00am: 105    2/23/24  6:00am: 93    2/24/24  6:00am: 99    2/25/24  6:00am: 114    2/26/24  6:00am: 127    2/27/24  6:00am: 95    2/28/24  6:00am: 120    2/29/24  6:00am: 132    3/1/24  6:00am: 123    3/2/24  6:00am: 114    3/3/24  6:00am: 99

## 2024-03-05 ENCOUNTER — VIRTUAL VISIT (OUTPATIENT)
Dept: ENDOCRINOLOGY | Facility: CLINIC | Age: 49
End: 2024-03-05
Payer: COMMERCIAL

## 2024-03-05 DIAGNOSIS — E11.9 TYPE 2 DIABETES MELLITUS WITHOUT COMPLICATION, WITHOUT LONG-TERM CURRENT USE OF INSULIN (H): Primary | ICD-10-CM

## 2024-03-05 DIAGNOSIS — E78.2 MIXED HYPERLIPIDEMIA: ICD-10-CM

## 2024-03-05 PROCEDURE — 99214 OFFICE O/P EST MOD 30 MIN: CPT | Mod: 95 | Performed by: INTERNAL MEDICINE

## 2024-03-05 PROCEDURE — G2211 COMPLEX E/M VISIT ADD ON: HCPCS | Mod: 95 | Performed by: INTERNAL MEDICINE

## 2024-03-05 RX ORDER — METFORMIN HCL 500 MG
1000 TABLET, EXTENDED RELEASE 24 HR ORAL 2 TIMES DAILY WITH MEALS
Qty: 360 TABLET | Refills: 1 | Status: SHIPPED | OUTPATIENT
Start: 2024-03-05 | End: 2024-09-10

## 2024-03-05 NOTE — LETTER
"    3/5/2024         RE: Milna Carrillo  [address] [facility] [address]        Dear Colleague,    Thank you for referring your patient, Milan Carrillo, to the [facility]. Please see a copy of my visit note below.    Outcome for 02/23/24 8:10 AM: HighlightCam message sent  Tamera Cerrato MA  Outcome for 03/01/24 11:10 AM: Left Voicemail   Betty Gong LPN   Outcome for 03/04/24 12:20 PM: Data obtained via phone and located below  Tamera Cerrato MA    2/19/24  6:00am: 109    2/20/24  6:00am: 109    2/21/24  6:00am: 104    2/22/24  6:00am: 105    2/23/24  6:00am: 93    2/24/24  6:00am: 99    2/25/24  6:00am: 114    2/26/24  6:00am: 127    2/27/24  6:00am: 95    2/28/24  6:00am: 120    2/29/24  6:00am: 132    3/1/24  6:00am: 123    3/2/24  6:00am: 114    3/3/24  6:00am: 99    THIS IS A VIDEO VISIT:    Phone call visit/virtual visit encounter:    Name of patient: Milan Carrillo    Date of encounter: 3/5/2024    Time of start of video visit: 9:01    Video started: 9:10    Video ended: 9:19    Provider location: working from home/ [facility]    Patient location: patients home.    Mode of transmission: [employer] video/ [employer]    Verbal consent: obtained before starting visit. Pt is agreeable.      The patient has been notified of following:      \"This VIDEO visit will be conducted via a call between you and your physician/provider. We have found that certain health care needs can be provided without the need for a physical exam.  This service lets us provide the care you need with a short phone conversation.  If a prescription is necessary we can send it directly to your pharmacy.  If lab work is needed we can place an order for that and you can then stop by our lab to have the test done at a later time.     With new updates with corona virus patient might be billed as clinic visit.     If during the course of the call the physician/provider feels a telephone visit is not appropriate, " "you will not be charged for this service.\"      Past medical history, social history, family history, allergy and medications were reviewed and updated as appropriate.  Reviewed pertinent labs, notes, imaging studies personally.      ENDOCRINOLOGY CLINIC NOTE:  Name: Milan Carrillo  Seen for f/u of type 2 Diabetes.  HPI:  Milan Carrillo is a 48 year old male who presents for the evaluation/management of Diabetes.   has a past medical history of Arthritis, Hyperlipidemia, Hypertension, Kidney stone, and Sleep apnea.    Wt loss- 42 over last 2 years.  Now stable.  He is interested in more weight loss and is questioning if he can increase dose of Ozempic.  Appetite is OK.    Current weight 240 lbs.  BMI 33-34 range.  Wt Readings from Last 2 Encounters:   09/05/23 110.7 kg (244 lb)   11/27/22 115.7 kg (255 lb)     He has questions about testosterone-- last 2022 check in range. Based on that he does not need to be on replacement.    1. Type 2 DM:  Orginally diagnosed at the age of: 46.  Current Regimen:   Metformin XR 1000 mg AM and 1000 mg PM.  Ozempic 1.0 mg/week.    yes:     Diabetes Medication(s)       Biguanides       metFORMIN (GLUCOPHAGE XR) 500 MG 24 hr tablet Take 2 tablets (1,000 mg) by mouth 2 times daily (with meals)       Incretin Mimetic Agents       Semaglutide, 1 MG/DOSE, (OZEMPIC) 4 MG/3ML pen Inject 1 mg Subcutaneous every 7 days        Not able to tolerate higher dose of regular metformin 2/2 to GI s/e. Tolerating XR well.  Takes regular metformin.    BS checks: 1-2 times/day  Average Meter Download: see [email or web address] message  Exercise: limited. Feels fatigued.  Last A1c:   Symptoms of hypoglycemia (low blood sugar):  Gets symptoms of hypoglycemia.  Episodes of hypoglycemia:    DM Complications:   Complications:   Diabetes Complications  Description / Detail    Diabetic Retinopathy  No   CAD / PAD  No   Neuropathy  + Dm neuropathy   Nephropathy / Microalbuminuria  No    Gastroparesis  No   Hypoglycemia " [place]  No       2. Hypertension:    Not on medications.  3. Hyperlipidemia: Not on medications. Noted to have high TG.    PMH/PSH:  Past Medical History:   Diagnosis Date     Arthritis      Hyperlipidemia      Hypertension      Kidney stone      Sleep apnea      Past Surgical History:   Procedure Laterality Date     ARTHROSCOPY SHOULDER ROTATOR CUFF REPAIR Right      ENT SURGERY       EYE SURGERY       HERNIA REPAIR       HERNIORRHAPHY UMBILICAL N/A 5/29/2020    Procedure: OPEN UMBILICAL HERNIA REPAIR WITH  MESH;  Surgeon: Curt Ramachandran MD;  Location:  OR     ORTHOPEDIC SURGERY      R shoulder     RECESSION RESECTION (REPAIR STRABISMUS)  10/1/2012    Procedure: RECESSION RESECTION (REPAIR STRABISMUS);  RIGHT STRABISMUS REPAIR;  Surgeon: Joanie Gurrola MD;  Location:  [facility]     RHINOPLASTY       STRABISMUS SURGERY       Family Hx:  Family History   Problem Relation Age of Onset     Glaucoma No family hx of      Macular Degeneration No family hx of      Pulmonary fibrosis Mother      No Known Problems Father      Hypertension Maternal Grandmother      Diabetes No family hx of      Cancer No family hx of        Diabetes:    Social Hx:  Social History     Socioeconomic History     Marital status:      Spouse name: Not on file     Number of children: Not on file     Years of education: Not on file     Highest education level: Not on file   Occupational History     Not on file   Tobacco Use     Smoking status: Never     Smokeless tobacco: Never   Substance and Sexual Activity     Alcohol use: Not Currently     Comment: Alcoholic Drinks/day: 2/month     Drug use: Never     Sexual activity: Not on file   Other Topics Concern     Parent/sibling w/ CABG, MI or angioplasty before 65F 55M? Not Asked   Social History Narrative     Not on file     Social Determinants of Health     Financial Resource Strain: Not on file   Food Insecurity: Not on file   Transportation Needs: Not on file   Physical  Activity: Not on file   Stress: Not on file   Social Connections: Not on file   Interpersonal Safety: Not on file   Housing Stability: Not on file          MEDICATIONS:  has a current medication list which includes the following prescription(s): contour next test, fenofibrate, metformin, semaglutide (1 mg/dose), sildenafil, cinnamon, red yeast rice extract, and vitamin d2.    ROS     ROS: 10 point ROS neg other than the symptoms noted above in the HPI.    Physical Exam   VS: There were no vitals taken for this visit.  GENERAL: healthy, alert and no distress  EYES: Eyes grossly normal to inspection, conjunctivae and sclerae normal  ENT: no nose swelling, nasal discharge.  Thyroid: no apparent thyroid nodules  RESP: no audible wheeze, cough, or visible cyanosis.  No visible retractions or increased work of breathing.  Able to speak fully in complete sentences.  ABDO: not evaluated.  EXTREMITIES: no hand tremors.  NEURO: Cranial nerves grossly intact, mentation intact and speech normal  SKIN: No apparent skin lesions, rash or edema seen   PSYCH: mentation appears normal, affect normal/bright, judgement and insight intact, normal speech and appearance well-groomed    LABS:  A1c:  Lab Results   Component Value Date    A1C 5.4 02/26/2024    A1C 5.7 08/30/2023    A1C 5.7 03/21/2023    A1C 6.0 09/21/2022    A1C 6.0 06/09/2022    A1C 5.4 05/30/2017    A1C 5.7 02/24/2016    A1C 5.2 06/04/2010     Creatinine:  Creatinine   Date Value Ref Range Status   02/26/2024 1.39 (H) 0.67 - 1.17 mg/dL Final   04/15/2020 1.08 0.66 - 1.25 mg/dL Final     Urine Micro:  Lab Results   Component Value Date    UMALCR  02/26/2024      Comment:      Unable to calculate, urine albumin and/or urine creatinine is outside detectable limits.  Microalbuminuria is defined as an albumin:creatinine ratio of 17 to 299 for males and 25 to 299 for females. A ratio of albumin:creatinine of 300 or higher is indicative of overt proteinuria.  Due to biologic  variability, positive results should be confirmed by a second, first-morning random or 24-hour timed urine specimen. If there is discrepancy, a third specimen is recommended. When 2 out of 3 results are in the microalbuminuria range, this is evidence for incipient nephropathy and warrants increased efforts at glucose control, blood pressure control, and institution of therapy with an angiotensin-converting-enzyme (ACE) inhibitor (if the patient can tolerate it).          LFTs/Lipids:  Recent Labs   Lab Test 02/26/24  0803 08/30/23  0835 06/07/21  1001 05/30/17  1703 02/24/16  1053   CHOL 159 184   < > 206.7* 230.2*   HDL 33* 36*   < > 35.1* 33.1*   LDL 96 118*   < > 124 158*   TRIG 152* 148   < > 237.5* 197.9*   CHOLHDLRATIO  --   --   --  5.9* 7.0*    < > = values in this interval not displayed.     TFTs:  TSH   Date Value Ref Range Status   06/09/2022 3.04 0.30 - 5.00 uIU/mL Final   09/18/2015 2.52 0.40 - 4.00 mU/L Final     All pertinent notes, labs, and images personally reviewed by me.     Glucometer/ insulin pump (if applicable)/ CGM data (if applicable) downloaded, Personally reviewed and interpreted.  All Blood sugar data reviewed personally and discussed with pt.      A/P  Mr.David JODY Carrillo is a 48 year old here for the evaluation/management of diabetes:    1. DM2 - Under Good control. A1c 5.4%.   No known complications from DM.  In general BG in rage.  He was  interested in medication that will help with wt loss as well.  Plan:  Discussed diagnosis, pathophysiology, management and treatment options of condition with pt.  Continue metformin XR 1000 mg AM and 1000 mg PM.  Increase Ozempic to 2.0 mg/week.    Monitor blood sugar closely.  After increasing dose of Ozempic, if you are experiencing low blood sugar, contact us.  Follow up with Lexi LEÓN in 3 months with labs prior.  Repeat labs and follow up with  in 6 months.  Please make a lab appointment for blood work and follow up clinic  appointment in 1 week after that to discuss results.    2. Hypertension - Under Good control.  Not on medication.    3. Hyperlipidemia - Under fair control.  LDL 96.  He was started on pravastatin but he never picked up the prescription.  For high triglyceride levels he is on fibrate 48 mg/day. Tg improved.  Previously discussed starting stating- pravastatin but he would like to hold off .    4. Prevention:  Opthalmology-recommend annual  ASA-not indicated secondary to age  Smoking-no    Foot exam: 6/2022.    Most Recent Immunizations   Administered Date(s) Administered     COVID-19 MONOVALENT 12+ (Pfizer) 10/06/2021     Influenza (IIV3) PF 09/27/2012     Influenza (intradermal) 10/24/2012     Influenza Vaccine >6 months,quad, PF 09/13/2021     TDAP (Adacel,Boostrix) 02/07/2019     TDAP Vaccine (Adacel) 02/07/2019     TDAP Vaccine (Boostrix) 09/27/2012         Recommend checking blood sugars before meals and at bedtime.    If Blood glucose are low more often-> 2-3 times/week- give us a call.  The patient is advised to Make better food choices: reduce carbs, Reduce portion size, weight loss and exercise 3-4 times a week.  Discussed hypoglycemia signs and symptoms as well as management in detail.    There is some variability among people, most will usually develop symptoms suggestive of hypoglycemia when blood glucose levels are lowered to the mid 60's. The first set of symptoms are called adrenergic. Patients may experience any of the following nervousness, sweating, intense hunger, trembling, weakness, palpitations, and difficulty speaking.   The acute management of hypoglycemia involves the rapid delivery of a source of easily absorbed sugar. Regular soda, juice, lifesavers, table sugar, are good options. 15 grams of glucose is the dose that is given, followed by an assessment of symptoms and a blood glucose check if possible. If after 10 minutes there is no improvement, another 10-15 grams should be given. This  can be repeated up to three times. The equivalency of 10-15 grams of glucose (approximate servings) are: Four lifesavers, 4 teaspoons of sugar, or 1/2 can of regular soda or juice.   Ozempic -- possible side effect profile of these class of medication includes: Nausea, diarrhea, gastrointestinal intolerance, abdominal pain, upper respiratory tract infection, headache, increased heart rate, drop in blood sugar, injection site reaction.  Rare side effects include pancreatitis, kidney problems.  It has been associated with thyroid cancer in animal models.  Possible side effects related to fibrates (like fenofibrate which are used to lower triglyceride levels) include increase in liver function test, skin reaction, abdominal pain, dizziness, muscle pain etc.  Discussed indications, risks and benefits of all medications prescribed, and answered questions to patient's satisfaction.  The longitudinal plan of care for the diagnosis(es)/condition(s) as documented were addressed during this visit. Due to the added complexity in care, I will continue to support Milan in the subsequent management and with ongoing continuity of care.    All questions were answered.  The patient indicates understanding of the above issues and agrees with the plan set forth.     Follow-up:  As noted in AVS    Lola Babcock M.D  Endocrinology  [facility]  CC: Sandor Sanford    Disclaimer: This note consists of symbols derived from keyboarding, dictation and/or voice recognition software. As a result, there may be errors in the script that have gone undetected. Please consider this when interpreting information found in this chart.    Addendum to above note and clinic visit:    Labs reviewed.    See result note/telephone encounter.      Again, thank you for allowing me to participate in the care of your patient.        Sincerely,        Lola Babcock MD

## 2024-03-05 NOTE — NURSING NOTE
Is the patient currently in the state of [place]? YES    Visit mode:VIDEO    If the visit is dropped, the patient can be reconnected by: VIDEO VISIT: Text to cell phone:   Telephone Information:   Mobile [phone]       Will anyone else be joining the visit? NO  (If patient encounters technical issues they should call [phone]    How would you like to obtain your AVS? MyChart    Are changes needed to the allergy or medication list? No    Reason for visit: RECHECK (Patient would like to increase ozempic and also interested in continuous glucose monitor. Patient would like to get one that is covered by insurance. )    Aline CASTORENA

## 2024-03-05 NOTE — PROGRESS NOTES
"THIS IS A VIDEO VISIT:    Phone call visit/virtual visit encounter:    Name of patient: Milan Carrillo    Date of encounter: 3/5/2024    Time of start of video visit: 9:01    Video started: 9:10    Video ended: 9:19    Provider location: working from home/ [facility]    Patient location: patients home.    Mode of transmission: [employer] video/ [place]    Verbal consent: obtained before starting visit. Pt is agreeable.      The patient has been notified of following:      \"This VIDEO visit will be conducted via a call between you and your physician/provider. We have found that certain health care needs can be provided without the need for a physical exam.  This service lets us provide the care you need with a short phone conversation.  If a prescription is necessary we can send it directly to your pharmacy.  If lab work is needed we can place an order for that and you can then stop by our lab to have the test done at a later time.     With new updates with corona virus patient might be billed as clinic visit.     If during the course of the call the physician/provider feels a telephone visit is not appropriate, you will not be charged for this service.\"      Past medical history, social history, family history, allergy and medications were reviewed and updated as appropriate.  Reviewed pertinent labs, notes, imaging studies personally.      ENDOCRINOLOGY CLINIC NOTE:  Name: Milan Carrillo  Seen for f/u of type 2 Diabetes.  HPI:  Milna Carrillo is a 48 year old male who presents for the evaluation/management of Diabetes.   has a past medical history of Arthritis, Hyperlipidemia, Hypertension, Kidney stone, and Sleep apnea.    Wt loss- 42 over last 2 years.  Now stable.  He is interested in more weight loss and is questioning if he can increase dose of Ozempic.  Appetite is OK.    Current weight 240 lbs.  BMI 33-34 range.  Wt Readings from Last 2 Encounters:   09/05/23 110.7 kg (244 lb)   11/27/22 115.7 kg (255 lb) "     He has questions about testosterone-- last 2022 check in range. Based on that he does not need to be on replacement.    1. Type 2 DM:  Orginally diagnosed at the age of: 46.  Current Regimen:   Metformin XR 1000 mg AM and 1000 mg PM.  Ozempic 1.0 mg/week.    yes:     Diabetes Medication(s)       Biguanides       metFORMIN (GLUCOPHAGE XR) 500 MG 24 hr tablet Take 2 tablets (1,000 mg) by mouth 2 times daily (with meals)       Incretin Mimetic Agents       Semaglutide, 1 MG/DOSE, (OZEMPIC) 4 MG/3ML pen Inject 1 mg Subcutaneous every 7 days        Not able to tolerate higher dose of regular metformin 2/2 to GI s/e. Tolerating XR well.  Takes regular metformin.    BS checks: 1-2 times/day  Average Meter Download: see [facility] message  Exercise: limited. Feels fatigued.  Last A1c:   Symptoms of hypoglycemia (low blood sugar):  Gets symptoms of hypoglycemia.  Episodes of hypoglycemia:    DM Complications:   Complications:   Diabetes Complications  Description / Detail    Diabetic Retinopathy  No   CAD / PAD  No   Neuropathy  + Dm neuropathy   Nephropathy / Microalbuminuria  No    Gastroparesis  No   Hypoglycemia [place]  No       2. Hypertension:    Not on medications.  3. Hyperlipidemia: Not on medications. Noted to have high TG.    PMH/PSH:  Past Medical History:   Diagnosis Date    Arthritis     Hyperlipidemia     Hypertension     Kidney stone     Sleep apnea      Past Surgical History:   Procedure Laterality Date    ARTHROSCOPY SHOULDER ROTATOR CUFF REPAIR Right     ENT SURGERY      EYE SURGERY      HERNIA REPAIR      HERNIORRHAPHY UMBILICAL N/A 5/29/2020    Procedure: OPEN UMBILICAL HERNIA REPAIR WITH  MESH;  Surgeon: Curt Ramachandran MD;  Location:  OR    ORTHOPEDIC SURGERY      R shoulder    RECESSION RESECTION (REPAIR STRABISMUS)  10/1/2012    Procedure: RECESSION RESECTION (REPAIR STRABISMUS);  RIGHT STRABISMUS REPAIR;  Surgeon: Joanie Gurrola MD;  Location:  [facility]    RHINOPLASTY      STRABISMUS  SURGERY       Family Hx:  Family History   Problem Relation Age of Onset    Glaucoma No family hx of     Macular Degeneration No family hx of     Pulmonary fibrosis Mother     No Known Problems Father     Hypertension Maternal Grandmother     Diabetes No family hx of     Cancer No family hx of        Diabetes:    Social Hx:  Social History     Socioeconomic History    Marital status:      Spouse name: Not on file    Number of children: Not on file    Years of education: Not on file    Highest education level: Not on file   Occupational History    Not on file   Tobacco Use    Smoking status: Never    Smokeless tobacco: Never   Substance and Sexual Activity    Alcohol use: Not Currently     Comment: Alcoholic Drinks/day: 2/month    Drug use: Never    Sexual activity: Not on file   Other Topics Concern    Parent/sibling w/ CABG, MI or angioplasty before 65F 55M? Not Asked   Social History Narrative    Not on file     Social Determinants of Health     Financial Resource Strain: Not on file   Food Insecurity: Not on file   Transportation Needs: Not on file   Physical Activity: Not on file   Stress: Not on file   Social Connections: Not on file   Interpersonal Safety: Not on file   Housing Stability: Not on file          MEDICATIONS:  has a current medication list which includes the following prescription(s): contour next test, fenofibrate, metformin, semaglutide (1 mg/dose), sildenafil, cinnamon, red yeast rice extract, and vitamin d2.    ROS     ROS: 10 point ROS neg other than the symptoms noted above in the HPI.    Physical Exam   VS: There were no vitals taken for this visit.  GENERAL: healthy, alert and no distress  EYES: Eyes grossly normal to inspection, conjunctivae and sclerae normal  ENT: no nose swelling, nasal discharge.  Thyroid: no apparent thyroid nodules  RESP: no audible wheeze, cough, or visible cyanosis.  No visible retractions or increased work of breathing.  Able to speak fully in complete  sentences.  ABDO: not evaluated.  EXTREMITIES: no hand tremors.  NEURO: Cranial nerves grossly intact, mentation intact and speech normal  SKIN: No apparent skin lesions, rash or edema seen   PSYCH: mentation appears normal, affect normal/bright, judgement and insight intact, normal speech and appearance well-groomed    LABS:  A1c:  Lab Results   Component Value Date    A1C 5.4 02/26/2024    A1C 5.7 08/30/2023    A1C 5.7 03/21/2023    A1C 6.0 09/21/2022    A1C 6.0 06/09/2022    A1C 5.4 05/30/2017    A1C 5.7 02/24/2016    A1C 5.2 06/04/2010     Creatinine:  Creatinine   Date Value Ref Range Status   02/26/2024 1.39 (H) 0.67 - 1.17 mg/dL Final   04/15/2020 1.08 0.66 - 1.25 mg/dL Final     Urine Micro:  Lab Results   Component Value Date    UMALCR  02/26/2024      Comment:      Unable to calculate, urine albumin and/or urine creatinine is outside detectable limits.  Microalbuminuria is defined as an albumin:creatinine ratio of 17 to 299 for males and 25 to 299 for females. A ratio of albumin:creatinine of 300 or higher is indicative of overt proteinuria.  Due to biologic variability, positive results should be confirmed by a second, first-morning random or 24-hour timed urine specimen. If there is discrepancy, a third specimen is recommended. When 2 out of 3 results are in the microalbuminuria range, this is evidence for incipient nephropathy and warrants increased efforts at glucose control, blood pressure control, and institution of therapy with an angiotensin-converting-enzyme (ACE) inhibitor (if the patient can tolerate it).          LFTs/Lipids:  Recent Labs   Lab Test 02/26/24  0803 08/30/23  0835 06/07/21  1001 05/30/17  1703 02/24/16  1053   CHOL 159 184   < > 206.7* 230.2*   HDL 33* 36*   < > 35.1* 33.1*   LDL 96 118*   < > 124 158*   TRIG 152* 148   < > 237.5* 197.9*   CHOLHDLRATIO  --   --   --  5.9* 7.0*    < > = values in this interval not displayed.     TFTs:  TSH   Date Value Ref Range Status    06/09/2022 3.04 0.30 - 5.00 uIU/mL Final   09/18/2015 2.52 0.40 - 4.00 mU/L Final     All pertinent notes, labs, and images personally reviewed by me.     Glucometer/ insulin pump (if applicable)/ CGM data (if applicable) downloaded, Personally reviewed and interpreted.  All Blood sugar data reviewed personally and discussed with pt.      A/P  Mr.David JODY Carrillo is a 48 year old here for the evaluation/management of diabetes:    1. DM2 - Under Good control. A1c 5.4%.   No known complications from DM.  In general BG in rage.  He was  interested in medication that will help with wt loss as well.  Plan:  Discussed diagnosis, pathophysiology, management and treatment options of condition with pt.  Continue metformin XR 1000 mg AM and 1000 mg PM.  Increase Ozempic to 2.0 mg/week.    Monitor blood sugar closely.  After increasing dose of Ozempic, if you are experiencing low blood sugar, contact us.  Follow up with Lexi LEÓN in 3 months with labs prior.  Repeat labs and follow up with  in 6 months.  Please make a lab appointment for blood work and follow up clinic appointment in 1 week after that to discuss results.    2. Hypertension - Under Good control.  Not on medication.    3. Hyperlipidemia - Under fair control.  LDL 96.  He was started on pravastatin but he never picked up the prescription.  For high triglyceride levels he is on fibrate 48 mg/day. Tg improved.  Previously discussed starting stating- pravastatin but he would like to hold off .    4. Prevention:  Opthalmology-recommend annual  ASA-not indicated secondary to age  Smoking-no    Foot exam: 6/2022.    Most Recent Immunizations   Administered Date(s) Administered    COVID-19 MONOVALENT 12+ (Pfizer) 10/06/2021    Influenza (IIV3) PF 09/27/2012    Influenza (intradermal) 10/24/2012    Influenza Vaccine >6 months,quad, PF 09/13/2021    TDAP (Adacel,Boostrix) 02/07/2019    TDAP Vaccine (Adacel) 02/07/2019    TDAP Vaccine (Boostrix) 09/27/2012          Recommend checking blood sugars before meals and at bedtime.    If Blood glucose are low more often-> 2-3 times/week- give us a call.  The patient is advised to Make better food choices: reduce carbs, Reduce portion size, weight loss and exercise 3-4 times a week.  Discussed hypoglycemia signs and symptoms as well as management in detail.    There is some variability among people, most will usually develop symptoms suggestive of hypoglycemia when blood glucose levels are lowered to the mid 60's. The first set of symptoms are called adrenergic. Patients may experience any of the following nervousness, sweating, intense hunger, trembling, weakness, palpitations, and difficulty speaking.   The acute management of hypoglycemia involves the rapid delivery of a source of easily absorbed sugar. Regular soda, juice, lifesavers, table sugar, are good options. 15 grams of glucose is the dose that is given, followed by an assessment of symptoms and a blood glucose check if possible. If after 10 minutes there is no improvement, another 10-15 grams should be given. This can be repeated up to three times. The equivalency of 10-15 grams of glucose (approximate servings) are: Four lifesavers, 4 teaspoons of sugar, or 1/2 can of regular soda or juice.   Ozempic -- possible side effect profile of these class of medication includes: Nausea, diarrhea, gastrointestinal intolerance, abdominal pain, upper respiratory tract infection, headache, increased heart rate, drop in blood sugar, injection site reaction.  Rare side effects include pancreatitis, kidney problems.  It has been associated with thyroid cancer in animal models.  Possible side effects related to fibrates (like fenofibrate which are used to lower triglyceride levels) include increase in liver function test, skin reaction, abdominal pain, dizziness, muscle pain etc.  Discussed indications, risks and benefits of all medications prescribed, and answered questions to  patient's satisfaction.  The longitudinal plan of care for the diagnosis(es)/condition(s) as documented were addressed during this visit. Due to the added complexity in care, I will continue to support Milan in the subsequent management and with ongoing continuity of care.    All questions were answered.  The patient indicates understanding of the above issues and agrees with the plan set forth.     Follow-up:  As noted in AVS    Lola Babcock M.D  Endocrinology  [facility]/[place]  CC: Sandor Sanford    Disclaimer: This note consists of symbols derived from keyboarding, dictation and/or voice recognition software. As a result, there may be errors in the script that have gone undetected. Please consider this when interpreting information found in this chart.    Addendum to above note and clinic visit:    Labs reviewed.    See result note/telephone encounter.

## 2024-03-05 NOTE — PATIENT INSTRUCTIONS
[facility]  Dr Babcock, Endocrinology Department    [facility]   [address] [id number]  [place] [address]  Appointment [phone]  Fax: [phone]  [place]: Monday - Thursday      To provide the best diabetic care, please bring your blood glucose meter to each and every visit with your Endocrinologist.  Your blood glucose meter/insulin pump will be downloaded at every appointment.    Please arrive 15 minutes before your scheduled appointment.  This will allow for your blood glucose meter/insulin pump to be downloaded.  If you are wearing DEXCOM please bring  or sharing code so that it can be downloaded.  If you are using freestyle deya personal sensors please bring the reader.  If you are using TANDEM insulin pump please have your username and password to get info from [employer] website.    Continue metformin XR 1000 mg AM and 1000 mg PM.  Increase Ozempic to 2.0 mg/week.    Monitor blood sugar closely.  After increasing dose of Ozempic, if you are experiencing low blood sugar, contact us.    Ozempic-- possible side effect profile of these class of medication includes: Nausea, diarrhea, gastrointestinal intolerance, abdominal pain, upper respiratory tract infection, headache, increased heart rate, drop in blood sugar, injection site reaction.  Rare side effects include pancreatitis, kidney problems.  It has been associated with thyroid cancer in animal models.    Follow up with Lexi LEÓN in 3-4 months with labs prior.  ( She is a new Physician assistant who is at [facility] on Monday and  and will see follow up Diabetes patients)  Repeat labs and follow up with  in 6-7 months.  Please make a lab appointment for blood work and follow up clinic appointment in 1 week after that to discuss results.    Recommend checking blood sugars before meals and at bedtime.    If Blood glucose are low more often-> 2-3 times/week-  give us a call.  Make better food choices: reduce carbs, Reduce portion size, weight loss and exercise 3-4 times a week.    What is hypoglycemia:  Hypoglycemia is when blood sugar levels become too low - below 70 m/dl.      What causes hypoglycemia?  - using too much insulin  -taking too many diabetes pills  -not eating enough, or skipping meals or snacks  -not eating enough carbohydrate with meals  -changing your exercise routine  -drinking alcohol in excess    It is also possible to have hypoglycemia even when you are carefully managing your blood sugar levels.    What does it feel like when blood sugars get too low?  You may feel:  - anxious  -confused  -dizzy  -hungry  -light-headed  -nervous  -shaky  -sleepy  -sweaty    You may have  -blurred or cloudy vision  -heart palpitations (heart skips a beat or races)  -tingling or numbness around the mouth and tongue  -tremors    What to do if you have symptoms of hypoglycmemia:  If you think your blood sugar is too low, check it with a glucose meter.  If its below 70 mg/dl, consume one of the following:  Fruit juice (1/2 cup)  Glucose tablets (15 grams)  Hard candy (5 to 7 pieces)  Honey or sugar (2 teaspoons)  Milk (1/2 cup)  Soft drink (non-diet, 1/2 cup)    Wait 15 minutes and check your blood glucose again.  IF it is still below 70 mg/dl, have another food item listed above. Wait another 15 minutes and repeat the blood glucose test.  Have a small meal or snack that contains some carbohydrate after your blood glucose rises above 70 mg/dl.    If you are at risk of hypoglycemia, always carry with you glucose tablets or one of the foods listed above.      To prevent Hypoglycemia:  Avoid situations that may cause hypoglycemia  Before making any change to your diet or exercise routine, discuss them with your healthcare provider  Keep a record of your blood glucose levels.  Include the time of day, diabetes medications, when you had your last meal or snack, and what you  were doing at the time (e.g. Watching TV, gardening, jogging, etc).    Talk to your healthcare provider if your blood glucose levels are often low        Patient guide on hypoglycemia    http://www.hormone.org/Resources/upload/patient-guide-diagnosis-and-management-hypoglycemia-[email or web address].pdf

## 2024-03-06 ENCOUNTER — TELEPHONE (OUTPATIENT)
Dept: ENDOCRINOLOGY | Facility: CLINIC | Age: 49
End: 2024-03-06
Payer: COMMERCIAL

## 2024-03-06 NOTE — TELEPHONE ENCOUNTER
[facility] Call Center    Phone Message    May a detailed message be left on voicemail: yes     Reason for Call: Medication Question or concern regarding medication   Prescription Clarification  Name of Medication: Ozempic Shot  Prescribing Provider: Lola Babcock MD    Pharmacy: [place], [place] - [address]    What on the order needs clarification? Needs prior authorization to get his Ozempic filled.       Action Taken: Other: [place] endo    Travel Screening: Not Applicable

## 2024-03-06 NOTE — TELEPHONE ENCOUNTER
Prior Authorization Not Needed per Insurance    Medication: OZEMPIC (2 MG/DOSE) [facility]  Insurance Company: [employer] - Phone [phone] Fax [phone]  Expected CoPay: $    Pharmacy Filling the Rx: [facility] [place], [place] - [address]    Based on the test claims from yesterday (03/05/2024) and today (03/06/2024) a paid claim was received for Ozmepic from the patient's plan with a $40.00 copay, no prior auth required.

## 2024-03-14 NOTE — TELEPHONE ENCOUNTER
The filling pharmacy has received a paid claim for a 3 month supply with a copay of $74.99          Other (Free Text): Patient has quite a bit of PIH but overall acne does seem improved from previous visits. Patient is concerned about being on antibiotics for too long. We will have her follow up in 2 months with plan of getting off oral antibiotics if doing well at that visit. Detail Level: Detailed Note Text (......Xxx Chief Complaint.): This diagnosis correlates with the

## 2024-03-14 NOTE — TELEPHONE ENCOUNTER
[facility] was calling to let  the care team know that this medication do require a PA or it needs to be re sent with a Dx code, they said it's ok to call them to get clarification on this issue please review and follow up thank you

## 2024-03-14 NOTE — TELEPHONE ENCOUNTER
Patient would like an update on the PA for Ozempic.   Patient is stating that pharmacy sent request for additional info on Monday or Tuesday.  Patient is due for injection on 3/15/24 and if not received, will miss a dose.

## 2024-06-13 DIAGNOSIS — E11.9 TYPE 2 DIABETES MELLITUS WITHOUT COMPLICATION, WITHOUT LONG-TERM CURRENT USE OF INSULIN (H): ICD-10-CM

## 2024-06-13 RX ORDER — FENOFIBRATE 48 MG/1
48 TABLET, COATED ORAL DAILY
Qty: 90 TABLET | Refills: 0 | Status: SHIPPED | OUTPATIENT
Start: 2024-06-13 | End: 2024-09-10

## 2024-06-13 NOTE — TELEPHONE ENCOUNTER
Requested Prescriptions   Pending Prescriptions Disp Refills    fenofibrate (TRICOR) 48 MG tablet [Pharmacy Med Name: FENOFIBRATE 48MG TABS] 90 tablet 2     Sig: TAKE ONE TABLET BY MOUTH ONCE DAILY       Antihyperlipidemic agents Passed - 6/13/2024 10:06 AM        Passed - LDL on file in the past 12 months        Passed - Medication is active on med list        Passed - Recent (12 mo) or future (90 days) visit within the authorizing provider's specialty     The patient must have completed an in-person or virtual visit within the past 12 months or has a future visit scheduled within the next 90 days with the authorizing provider s specialty.  Urgent care and e-visits do not quality as an office visit for this protocol.          Passed - Patient is age 18 years or older

## 2024-06-23 ENCOUNTER — HEALTH MAINTENANCE LETTER (OUTPATIENT)
Age: 49
End: 2024-06-23

## 2024-07-05 ENCOUNTER — TELEPHONE (OUTPATIENT)
Dept: ENDOCRINOLOGY | Facility: CLINIC | Age: 49
End: 2024-07-05
Payer: COMMERCIAL

## 2024-07-05 ENCOUNTER — TELEPHONE (OUTPATIENT)
Dept: LAB | Facility: CLINIC | Age: 49
End: 2024-07-05
Payer: COMMERCIAL

## 2024-07-05 NOTE — PROGRESS NOTES
Patient is scheduled to have labs on Monday. There are no lab orders in his chart. Please review and place orders as needed. Thanks

## 2024-07-08 ENCOUNTER — DOCUMENTATION ONLY (OUTPATIENT)
Dept: ENDOCRINOLOGY | Facility: CLINIC | Age: 49
End: 2024-07-08

## 2024-07-08 NOTE — PROGRESS NOTES
Patient is on lab schedule on the 9th and there are no lab orders could you please order if needed.

## 2024-07-09 ENCOUNTER — TELEPHONE (OUTPATIENT)
Dept: ENDOCRINOLOGY | Facility: CLINIC | Age: 49
End: 2024-07-09

## 2024-07-09 ENCOUNTER — LAB (OUTPATIENT)
Dept: LAB | Facility: CLINIC | Age: 49
End: 2024-07-09
Payer: COMMERCIAL

## 2024-07-09 DIAGNOSIS — E78.2 MIXED HYPERLIPIDEMIA: ICD-10-CM

## 2024-07-09 DIAGNOSIS — E11.9 TYPE 2 DIABETES MELLITUS WITHOUT COMPLICATION, WITHOUT LONG-TERM CURRENT USE OF INSULIN (H): ICD-10-CM

## 2024-07-09 DIAGNOSIS — E11.9 TYPE 2 DIABETES MELLITUS WITHOUT COMPLICATION, WITHOUT LONG-TERM CURRENT USE OF INSULIN (H): Primary | ICD-10-CM

## 2024-07-09 LAB
ANION GAP SERPL CALCULATED.3IONS-SCNC: 9 MMOL/L (ref 7–15)
BUN SERPL-MCNC: 14.3 MG/DL (ref 6–20)
CALCIUM SERPL-MCNC: 9.6 MG/DL (ref 8.6–10)
CHLORIDE SERPL-SCNC: 107 MMOL/L (ref 98–107)
CHOLEST SERPL-MCNC: 165 MG/DL
CREAT SERPL-MCNC: 1.27 MG/DL (ref 0.67–1.17)
DEPRECATED HCO3 PLAS-SCNC: 23 MMOL/L (ref 22–29)
EGFRCR SERPLBLD CKD-EPI 2021: 70 ML/MIN/1.73M2
FASTING STATUS PATIENT QL REPORTED: YES
FASTING STATUS PATIENT QL REPORTED: YES
GLUCOSE SERPL-MCNC: 104 MG/DL (ref 70–99)
HBA1C MFR BLD: 5.5 % (ref 0–5.6)
HDLC SERPL-MCNC: 31 MG/DL
LDLC SERPL CALC-MCNC: 76 MG/DL
NONHDLC SERPL-MCNC: 134 MG/DL
POTASSIUM SERPL-SCNC: 4 MMOL/L (ref 3.4–5.3)
SODIUM SERPL-SCNC: 139 MMOL/L (ref 135–145)
TRIGL SERPL-MCNC: 290 MG/DL

## 2024-07-09 PROCEDURE — 80061 LIPID PANEL: CPT

## 2024-07-09 PROCEDURE — 83036 HEMOGLOBIN GLYCOSYLATED A1C: CPT

## 2024-07-09 PROCEDURE — 36415 COLL VENOUS BLD VENIPUNCTURE: CPT

## 2024-07-09 PROCEDURE — 80048 BASIC METABOLIC PNL TOTAL CA: CPT

## 2024-07-09 NOTE — PROGRESS NOTES
Tried to call patient to let him know we do not have any orders from [facility] yet.  There was no answer.

## 2024-07-09 NOTE — TELEPHONE ENCOUNTER
Patient called is at the lab right now and there are no Diabetic lab orders.  Patient has a VV with Grace [facility].  Please order asap.  Patient is waiting.

## 2024-07-15 NOTE — PROGRESS NOTES
Outcome for 07/15/24 9:38 AM: [employer] message sent  Tamera Cerrato MA  Outcome for 07/22/24 3:48 PM: Left Voicemail   Tamera Cerrato MA  Outcome for 07/23/24 1:17 PM: Data obtained via phone and located below  Tamera Cerrato MA    Patient is showing 3/5 [place] met. BP out range and Not on statin - BP not on file.   Tamera Cerrato MA      7/23/24  92    7/22/24  95    7/21/24  89    7/20/24  122    7/19/24  101    7/18/24  102    7/17/24  105    7/16/24  104    7/15/24  90    7/14/24  102    7/13/24  103    7/12/24  96    7/11/24  92    7/10/24  90    7/9/24  119    7/8/24  124    7/7/24  113

## 2024-07-22 ENCOUNTER — TELEPHONE (OUTPATIENT)
Dept: ENDOCRINOLOGY | Facility: CLINIC | Age: 49
End: 2024-07-22
Payer: COMMERCIAL

## 2024-07-22 NOTE — TELEPHONE ENCOUNTER
Called patient and left voicemail. Patient has an appointment on  7/24/24 . Need to collect the last 14 days worth of blood sugar readings to prepare for patient's visit.   Tamera Cerrato MA

## 2024-07-23 NOTE — TELEPHONE ENCOUNTER
7/23/24  92    7/22/24  95    7/21/24  89    7/20/24  122    7/19/24  101    7/18/24  102    7/17/24  105    7/16/24  104    7/15/24  90    7/14/24  102    7/13/24  103    7/12/24  96    7/11/24  92    7/10/24  90    7/9/24  119    7/8/24  124    7/7/24  113

## 2024-07-24 ENCOUNTER — VIRTUAL VISIT (OUTPATIENT)
Dept: ENDOCRINOLOGY | Facility: CLINIC | Age: 49
End: 2024-07-24
Payer: COMMERCIAL

## 2024-07-24 ENCOUNTER — TELEPHONE (OUTPATIENT)
Dept: ENDOCRINOLOGY | Facility: CLINIC | Age: 49
End: 2024-07-24

## 2024-07-24 DIAGNOSIS — M79.10 MYALGIA: ICD-10-CM

## 2024-07-24 DIAGNOSIS — E11.9 TYPE 2 DIABETES MELLITUS WITHOUT COMPLICATION, WITHOUT LONG-TERM CURRENT USE OF INSULIN (H): Primary | ICD-10-CM

## 2024-07-24 PROCEDURE — G2211 COMPLEX E/M VISIT ADD ON: HCPCS | Mod: 95 | Performed by: PHYSICIAN ASSISTANT

## 2024-07-24 PROCEDURE — 99214 OFFICE O/P EST MOD 30 MIN: CPT | Mod: 95 | Performed by: PHYSICIAN ASSISTANT

## 2024-07-24 RX ORDER — BLOOD-GLUCOSE SENSOR
1 EACH MISCELLANEOUS
Qty: 2 EACH | Refills: 3 | Status: SHIPPED | OUTPATIENT
Start: 2024-07-24

## 2024-07-24 RX ORDER — TIRZEPATIDE 5 MG/.5ML
5 INJECTION, SOLUTION SUBCUTANEOUS
Qty: 2 ML | Refills: 0 | Status: SHIPPED | OUTPATIENT
Start: 2024-07-24 | End: 2024-08-09 | Stop reason: DRUGHIGH

## 2024-07-24 NOTE — LETTER
7/24/2024      Milan Carrillo  [address] [address]      Dear Colleague,    Thank you for referring your patient, Milan Carrillo, to the [facility]. Please see a copy of my visit note below.    Outcome for 07/15/24 9:38 AM: [employer] message sent  Tamera Cerrato MA  Outcome for 07/22/24 3:48 PM: Left Voicemail   Tamera Cerrato MA  Outcome for 07/23/24 1:17 PM: Data obtained via phone and located below  Tamera Cerrato MA    Patient is showing 3/5 MNCM met. BP out range and Not on statin - BP not on file.   Tamera Cerrato MA      7/23/24  92    7/22/24  95    7/21/24  89    7/20/24  122    7/19/24  101    7/18/24  102    7/17/24  105    7/16/24  104    7/15/24  90    7/14/24  102    7/13/24  103    7/12/24  96    7/11/24  92    7/10/24  90    7/9/24  119    7/8/24  124    7/7/24  113    Assessment/Plan :   Type 2 DM. Milan continues to work on managing his blood sugars. He was worried that his A1C would be elevated and he was glad to hear that it has remained at 5.5%. However, he is frustrated by new weight gain. We discussed his current medications and he would like to try switching the Ozempic to Mounjaro. We discussed the differences between Ozempic and Mounjaro and I will send in a new prescription for Mounjaro 5 mg weekly. If he has any trouble picking up the medication or if he develops side effects, he will contact our office. He will follow-up with Dr. Babcock in October.  Muscle cramps. We discussed the possible causes of muscle cramps. I don't think it is connected to metformin but he could try cutting back down to 2 tablets daily. He can also work on staying well hydrated and making sure that he is replacing electrolytes with sugar free Gatorade or pickel juice. He could also try taking magnesium glycinate at night, before bed. If this continues to be an issue, he will contact our office.     Due to the COVID 19 pandemic this visit was a telephone/video  visit in order to help prevent spread of infection in this patient and the general population. The patient gave verbal consent for the visit today. I have independently reviewed and interpreted labs, imaging as indicated.       Distant Location (provider location):  Off-site  Mode of Communication:  Video Conference via [employer]  Chart review/prep time 5    Joined the call at 7/24/2024, 11:01:21 am.  Left the call at 7/24/2024, 11:18:53 am.  You were on the call for 17 minutes 32 seconds .  26 minutes spent on the date of the encounter doing chart review, history and exam, documentation and further activities as noted above.      Chief complaint:  Milan is a 49 year old male who returns for follow-up of Type 2 DM.    I have reviewed Care Everywhere including [facility], [facility], [facility], [facility], [facility] , [facility], [place] lab reports, imaging reports and provider notes as indicated.      HISTORY OF PRESENT ILLNESS  Milan is worried about his blood sugars and weight. He was taking Ozempic 1 mg weekly and doing well. He had lost weight and his blood sugars were very stable. At his last visit with Dr. Babcock, in March, the dose was increased to 2 mg weekly. He was hopeful that this would help with further weight loss. However, after the increase, he noticed that his appetite was back and his blood sugars seemed to increase. He thought that he may have had a faulty pen, but the problem continued. Since increasing the dose he has gained 10 lbs.    Milan has also continued to take metformin 2000 mg daily. He worries that this may be contributing to increasing muscle aches. He has noticed that it takes him longer to recover from physical activity. He has not had any problems with severe hyperglycemia and/or hypoglycemia. He has continued to monitor his blood sugars with fingerstick testing. He would like to switch to sensor therapy, if possible. Lastly, he has not had any  problems with blurred vision or numbness/tingling in his feet.    Milan was diagnosed with diabetes a few years ago at age 46. He was started on metformin at the time of diagnosis. He had trouble tolerating the higher doses of IR metformin, so it was switched to XR. This has worked to improve blood sugars but he continued to struggle with his weight. He was then started on Ozempic, which worked, until he increased to 2 mg weekly.    Endocrine relevant labs are as follows:   Latest Reference Range & Units 07/09/24 08:24   Hemoglobin A1C 0.0 - 5.6 % 5.5      Latest Reference Range & Units 02/26/24 08:03   Hemoglobin A1C 0.0 - 5.6 % 5.4      Latest Reference Range & Units 02/26/24 08:03   Albumin Urine mg/L mg/L <12.0     REVIEW OF SYSTEMS    Endocrine: positive for diabetes  Skin: negative  Eyes: negative for, visual blurring, redness, tearing  Ears/Nose/Throat: negative  Respiratory: No shortness of breath, dyspnea on exertion, cough, or hemoptysis  Cardiovascular: negative for, chest pain, dyspnea on exertion, lower extremity edema, and exercise intolerance  Gastrointestinal: negative for, nausea, vomiting, constipation, and diarrhea  Genitourinary: negative for, nocturia, dysuria, frequency, and urgency  Musculoskeletal: negative for, muscular weakness, nocturnal cramping, and foot pain  Neurologic: negative for, local weakness, numbness or tingling of hands, and numbness or tingling of feet  Psychiatric: negative  Hematologic/Lymphatic/Immunologic: negative    Past Medical History  Past Medical History:   Diagnosis Date     Arthritis      Hyperlipidemia      Hypertension      Kidney stone      Sleep apnea        Medications  Current Outpatient Medications   Medication Sig Dispense Refill     CONTOUR NEXT TEST test strip USE AS DIRECTED THREE TIMES DAILY       fenofibrate (TRICOR) 48 MG tablet TAKE ONE TABLET BY MOUTH ONCE DAILY 90 tablet 0     metFORMIN (GLUCOPHAGE XR) 500 MG 24 hr tablet Take 2 tablets (1,000 mg)  by mouth 2 times daily (with meals) 360 tablet 1     Semaglutide, 2 MG/DOSE, (OZEMPIC) 8 MG/3ML pen Inject 2 mg Subcutaneous every 7 days 9 mL 1     sildenafil (REVATIO) 20 MG tablet Take 3-4 tabs as directed before sexual activity 90 tablet 3     vitamin D2 (ERGOCALCIFEROL) 52105 units (1250 mcg) capsule TAKE 1 CAPSULE BY MOUTH 1 TIME A WEEK FOR 12 DOSES (Patient not taking: Reported on 3/5/2024) 12 capsule 0       Allergies  No Known Allergies    Family History  family history includes Hypertension in his maternal grandmother; No Known Problems in his father; Pulmonary fibrosis in his mother.    Social History  Social History     Tobacco Use     Smoking status: Never     Smokeless tobacco: Never   Substance Use Topics     Alcohol use: Not Currently     Comment: Alcoholic Drinks/day: 2/month     Drug use: Never       Physical Exam  There is no height or weight on file to calculate BMI.  GENERAL: no distress  SKIN: Visible skin clear. No significant rash, abnormal pigmentation or lesions.  EYES: Eyes grossly normal to inspection.  No discharge or erythema, or obvious scleral/conjunctival abnormalities.  NECK: visible goiter is not present; no visible neck masses  RESP: No audible wheeze, cough, or visible cyanosis.  No visible retractions or increased work of breathing.    NEURO: Awake, alert, responds appropriately to questions.  Mentation and speech fluent.  PSYCH:affect normal and appearance well-groomed.      DATA REVIEW  Please see attached glucose logs  Current Ave  mg/dl        Again, thank you for allowing me to participate in the care of your patient.        Sincerely,        Lexi Hammond PA-C

## 2024-07-24 NOTE — TELEPHONE ENCOUNTER
PA Initiation    Medication: FREESTYLE WAYLON 3 SENSOR [facility]  Insurance Company: [employer] - Phone [phone] Fax [phone]  Pharmacy Filling the Rx: [place], [place] - [address]  Filling Pharmacy Phone: [phone]  Filling Pharmacy Fax: [phone]  Start Date: 7/24/2024

## 2024-07-24 NOTE — NURSING NOTE
Current patient location: [address] [place] [id number]    Is the patient currently in the state of [place]? YES    Visit mode:VIDEO    If the visit is dropped, the patient can be reconnected by: VIDEO VISIT: Text to cell phone:   Telephone Information:   Mobile [phone]       Will anyone else be joining the visit? NO  (If patient encounters technical issues they should call [phone])    How would you like to obtain your AVS? MyChart    Are changes needed to the allergy or medication list? Yes Pt is no longer taking the 50,000 units of Vit D3, pt is taking a Vit D OTC sporadically    Are refills needed on medications prescribed by this physician? NO    Reason for visit: RECHECK (dm)    Sheri Willard VVF      PT would like the continuous glucose monitor today- if the prescription could be sent to:  [facility]   [address] [place] [address] [address] [place] [address]

## 2024-07-24 NOTE — PROGRESS NOTES
Assessment/Plan :   Type 2 DM. Milan continues to work on managing his blood sugars. He was worried that his A1C would be elevated and he was glad to hear that it has remained at 5.5%. However, he is frustrated by new weight gain. We discussed his current medications and he would like to try switching the Ozempic to Mounjaro. We discussed the differences between Ozempic and Mounjaro and I will send in a new prescription for Mounjaro 5 mg weekly. If he has any trouble picking up the medication or if he develops side effects, he will contact our office. He will follow-up with Dr. Babcock in October.  Muscle cramps. We discussed the possible causes of muscle cramps. I don't think it is connected to metformin but he could try cutting back down to 2 tablets daily. He can also work on staying well hydrated and making sure that he is replacing electrolytes with sugar free Gatorade or pickel juice. He could also try taking magnesium glycinate at night, before bed. If this continues to be an issue, he will contact our office.     Due to the COVID 19 pandemic this visit was a telephone/video visit in order to help prevent spread of infection in this patient and the general population. The patient gave verbal consent for the visit today. I have independently reviewed and interpreted labs, imaging as indicated.       Distant Location (provider location):  Off-site  Mode of Communication:  Video Conference via [employer]  Chart review/prep time 5    Joined the call at 7/24/2024, 11:01:21 am.  Left the call at 7/24/2024, 11:18:53 am.  You were on the call for 17 minutes 32 seconds .  26 minutes spent on the date of the encounter doing chart review, history and exam, documentation and further activities as noted above.      Chief complaint:  Milan is a 49 year old male who returns for follow-up of Type 2 DM.    I have reviewed Care Everywhere including [facility], [facility], [facility], [facility], [place], [facility],  [facility] lab reports, imaging reports and provider notes as indicated.      HISTORY OF PRESENT ILLNESS  Milan is worried about his blood sugars and weight. He was taking Ozempic 1 mg weekly and doing well. He had lost weight and his blood sugars were very stable. At his last visit with Dr. Babcock, in March, the dose was increased to 2 mg weekly. He was hopeful that this would help with further weight loss. However, after the increase, he noticed that his appetite was back and his blood sugars seemed to increase. He thought that he may have had a faulty pen, but the problem continued. Since increasing the dose he has gained 10 lbs.    Milan has also continued to take metformin 2000 mg daily. He worries that this may be contributing to increasing muscle aches. He has noticed that it takes him longer to recover from physical activity. He has not had any problems with severe hyperglycemia and/or hypoglycemia. He has continued to monitor his blood sugars with fingerstick testing. He would like to switch to sensor therapy, if possible. Lastly, he has not had any problems with blurred vision or numbness/tingling in his feet.    Milan was diagnosed with diabetes a few years ago at age 46. He was started on metformin at the time of diagnosis. He had trouble tolerating the higher doses of IR metformin, so it was switched to XR. This has worked to improve blood sugars but he continued to struggle with his weight. He was then started on Ozempic, which worked, until he increased to 2 mg weekly.    Endocrine relevant labs are as follows:   Latest Reference Range & Units 07/09/24 08:24   Hemoglobin A1C 0.0 - 5.6 % 5.5      Latest Reference Range & Units 02/26/24 08:03   Hemoglobin A1C 0.0 - 5.6 % 5.4      Latest Reference Range & Units 02/26/24 08:03   Albumin Urine mg/L mg/L <12.0     REVIEW OF SYSTEMS    Endocrine: positive for diabetes  Skin: negative  Eyes: negative for, visual blurring, redness,  tearing  Ears/Nose/Throat: negative  Respiratory: No shortness of breath, dyspnea on exertion, cough, or hemoptysis  Cardiovascular: negative for, chest pain, dyspnea on exertion, lower extremity edema, and exercise intolerance  Gastrointestinal: negative for, nausea, vomiting, constipation, and diarrhea  Genitourinary: negative for, nocturia, dysuria, frequency, and urgency  Musculoskeletal: negative for, muscular weakness, nocturnal cramping, and foot pain  Neurologic: negative for, local weakness, numbness or tingling of hands, and numbness or tingling of feet  Psychiatric: negative  Hematologic/Lymphatic/Immunologic: negative    Past Medical History  Past Medical History:   Diagnosis Date    Arthritis     Hyperlipidemia     Hypertension     Kidney stone     Sleep apnea        Medications  Current Outpatient Medications   Medication Sig Dispense Refill    CONTOUR NEXT TEST test strip USE AS DIRECTED THREE TIMES DAILY      fenofibrate (TRICOR) 48 MG tablet TAKE ONE TABLET BY MOUTH ONCE DAILY 90 tablet 0    metFORMIN (GLUCOPHAGE XR) 500 MG 24 hr tablet Take 2 tablets (1,000 mg) by mouth 2 times daily (with meals) 360 tablet 1    Semaglutide, 2 MG/DOSE, (OZEMPIC) 8 MG/3ML pen Inject 2 mg Subcutaneous every 7 days 9 mL 1    sildenafil (REVATIO) 20 MG tablet Take 3-4 tabs as directed before sexual activity 90 tablet 3    vitamin D2 (ERGOCALCIFEROL) 50611 units (1250 mcg) capsule TAKE 1 CAPSULE BY MOUTH 1 TIME A WEEK FOR 12 DOSES (Patient not taking: Reported on 3/5/2024) 12 capsule 0       Allergies  No Known Allergies    Family History  family history includes Hypertension in his maternal grandmother; No Known Problems in his father; Pulmonary fibrosis in his mother.    Social History  Social History     Tobacco Use    Smoking status: Never    Smokeless tobacco: Never   Substance Use Topics    Alcohol use: Not Currently     Comment: Alcoholic Drinks/day: 2/month    Drug use: Never       Physical Exam  There is no  height or weight on file to calculate BMI.  GENERAL: no distress  SKIN: Visible skin clear. No significant rash, abnormal pigmentation or lesions.  EYES: Eyes grossly normal to inspection.  No discharge or erythema, or obvious scleral/conjunctival abnormalities.  NECK: visible goiter is not present; no visible neck masses  RESP: No audible wheeze, cough, or visible cyanosis.  No visible retractions or increased work of breathing.    NEURO: Awake, alert, responds appropriately to questions.  Mentation and speech fluent.  PSYCH:affect normal and appearance well-groomed.      DATA REVIEW  Please see attached glucose logs  Current Ave  mg/dl

## 2024-07-25 ENCOUNTER — TELEPHONE (OUTPATIENT)
Dept: ENDOCRINOLOGY | Facility: CLINIC | Age: 49
End: 2024-07-25
Payer: COMMERCIAL

## 2024-07-25 DIAGNOSIS — E11.9 TYPE 2 DIABETES MELLITUS WITHOUT COMPLICATION, WITHOUT LONG-TERM CURRENT USE OF INSULIN (H): ICD-10-CM

## 2024-07-25 RX ORDER — BLOOD-GLUCOSE SENSOR
1 EACH MISCELLANEOUS
Qty: 2 EACH | Refills: 3 | Status: CANCELLED | OUTPATIENT
Start: 2024-07-25

## 2024-07-25 NOTE — TELEPHONE ENCOUNTER
PA Approved for Freestyle Trista. Order was released per clinic prior to PA being approved. Closing encounter

## 2024-07-25 NOTE — TELEPHONE ENCOUNTER
[facility] Call Center    Phone Message    May a detailed message be left on voicemail: yes     Reason for Call: Other: Patient calling about freestyle deya 3 and states that he doesn't need a prior auth he called insurance and they stated its covered as well as he has a coupon for a free one. Please reach out to patient.     Action Taken: Message routed to:  [facility] endo    Travel Screening: Not Applicable     Date of Service:

## 2024-07-25 NOTE — TELEPHONE ENCOUNTER
Prior Authorization Approval    Medication: FREESTYLE WAYLON 3 SENSOR [facility]  Authorization Effective Date: 7/25/2024  Authorization Expiration Date: 7/24/2025  Approved Dose/Quantity: 1 month  Reference #: [id number]   Insurance Company: [employer] - Phone [phone] Fax [phone]  Expected CoPay: $    CoPay Card Available:      Financial Assistance Needed:    Which Pharmacy is filling the prescription: [facility] [place], [place] - [address]  Pharmacy Notified: order was released to pharmacy per clinic before PA was approved  Patient Notified: pharmacy to call pt when ready

## 2024-07-25 NOTE — TELEPHONE ENCOUNTER
PT has brought in  copay card and requests RX be sent regardless of PA approval. Please continue PA process anyway, in case we need future approval.    Thank you,  Keisha Lemus, [facility] Float [facility]

## 2024-07-26 ENCOUNTER — TELEPHONE (OUTPATIENT)
Dept: ENDOCRINOLOGY | Facility: CLINIC | Age: 49
End: 2024-07-26
Payer: COMMERCIAL

## 2024-07-26 DIAGNOSIS — E11.9 TYPE 2 DIABETES MELLITUS WITHOUT COMPLICATION, WITHOUT LONG-TERM CURRENT USE OF INSULIN (H): Primary | ICD-10-CM

## 2024-07-26 RX ORDER — KETOROLAC TROMETHAMINE 30 MG/ML
1 INJECTION, SOLUTION INTRAMUSCULAR; INTRAVENOUS CONTINUOUS
Qty: 1 EACH | Refills: 0 | Status: SHIPPED | OUTPATIENT
Start: 2024-07-26

## 2024-07-26 NOTE — TELEPHONE ENCOUNTER
Willy Guillen JR    7/25/24  3:45 PM  Note  PA Approved for Freestyle Trista. Order was released per clinic prior to PA being approved. Closing encounter

## 2024-07-26 NOTE — TELEPHONE ENCOUNTER
PT picked up freestyle trista 3 sensor, but needs rx for device reader. Please send Rx for Trista 3 reader.    Thank you,  Keisha Lemus, [facility] Float [facility]   
No adenopathy or splenomegaly. No cervical or inguinal lymphadenopathy.

## 2024-08-09 DIAGNOSIS — E11.9 TYPE 2 DIABETES MELLITUS WITHOUT COMPLICATION, WITHOUT LONG-TERM CURRENT USE OF INSULIN (H): Primary | ICD-10-CM

## 2024-08-29 ENCOUNTER — APPOINTMENT (OUTPATIENT)
Dept: GENERAL RADIOLOGY | Facility: CLINIC | Age: 49
End: 2024-08-29
Attending: PHYSICIAN ASSISTANT
Payer: OTHER MISCELLANEOUS

## 2024-08-29 ENCOUNTER — HOSPITAL ENCOUNTER (EMERGENCY)
Facility: CLINIC | Age: 49
Discharge: HOME | End: 2024-08-29
Admitting: PHYSICIAN ASSISTANT
Payer: OTHER MISCELLANEOUS

## 2024-08-29 VITALS
SYSTOLIC BLOOD PRESSURE: 146 MMHG | WEIGHT: 250 LBS | TEMPERATURE: 98.1 F | RESPIRATION RATE: 16 BRPM | BODY MASS INDEX: 34.87 KG/M2 | DIASTOLIC BLOOD PRESSURE: 91 MMHG | OXYGEN SATURATION: 95 % | HEART RATE: 86 BPM

## 2024-08-29 DIAGNOSIS — S69.91XA INJURY OF RIGHT THUMB, INITIAL ENCOUNTER: ICD-10-CM
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
## 2024-08-29 PROCEDURE — 99284 EMERGENCY DEPT VISIT MOD MDM: CPT | Performed by: PHYSICIAN ASSISTANT

## 2024-08-29 PROCEDURE — 73110 X-RAY EXAM OF WRIST: CPT | Mod: RT

## 2024-08-29 PROCEDURE — 29125 APPL SHORT ARM SPLINT STATIC: CPT | Mod: RT | Performed by: PHYSICIAN ASSISTANT

## 2024-08-29 PROCEDURE — 73130 X-RAY EXAM OF HAND: CPT | Mod: RT

## 2024-08-29 PROCEDURE — 99283 EMERGENCY DEPT VISIT LOW MDM: CPT | Performed by: PHYSICIAN ASSISTANT

## 2024-08-29 ASSESSMENT — COLUMBIA-SUICIDE SEVERITY RATING SCALE - C-SSRS
1. IN THE PAST MONTH, HAVE YOU WISHED YOU WERE DEAD OR WISHED YOU COULD GO TO SLEEP AND NOT WAKE UP?: NO
6. HAVE YOU EVER DONE ANYTHING, STARTED TO DO ANYTHING, OR PREPARED TO DO ANYTHING TO END YOUR LIFE?: NO
2. HAVE YOU ACTUALLY HAD ANY THOUGHTS OF KILLING YOURSELF IN THE PAST MONTH?: NO

## 2024-08-29 ASSESSMENT — ACTIVITIES OF DAILY LIVING (ADL)
ADLS_ACUITY_SCORE: 33
ADLS_ACUITY_SCORE: 35

## 2024-08-29 NOTE — ED TRIAGE NOTES
Pt was injury upstairs during a code 21 at work. Pt was involved in a code, bringing a pt down to the ground and injured right thumb and the area right above his wrist on his right side.      Triage Assessment (Adult)       Row Name 08/29/24 1507          Triage Assessment    Airway WDL WDL        Respiratory WDL    Respiratory WDL WDL        Skin Circulation/Temperature WDL    Skin Circulation/Temperature WDL WDL        Cardiac WDL    Cardiac WDL WDL        Peripheral/Neurovascular WDL    Peripheral Neurovascular WDL WDL        Cognitive/Neuro/Behavioral WDL    Cognitive/Neuro/Behavioral WDL WDL

## 2024-08-29 NOTE — DISCHARGE INSTRUCTIONS
Your x-rays are reassuring that you only have a sprain of your thumb.  However, if you continue to have pain you need to follow-up with a hand surgeon to rule out ligamentous injury.  You may take ibuprofen or Tylenol at home as needed for pain.

## 2024-08-29 NOTE — Clinical Note
Milan Carrillo was seen and treated in our emergency department on 8/29/2024.  He may return to work on 08/31/2024.       If you have any questions or concerns, please don't hesitate to call.      Alhaji Haq PA-C
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
## 2024-09-03 DIAGNOSIS — E11.9 TYPE 2 DIABETES MELLITUS WITHOUT COMPLICATION, WITHOUT LONG-TERM CURRENT USE OF INSULIN (H): Primary | ICD-10-CM

## 2024-09-07 DIAGNOSIS — E11.9 TYPE 2 DIABETES MELLITUS WITHOUT COMPLICATION, WITHOUT LONG-TERM CURRENT USE OF INSULIN (H): ICD-10-CM

## 2024-09-09 ENCOUNTER — MYC MEDICAL ADVICE (OUTPATIENT)
Dept: ENDOCRINOLOGY | Facility: CLINIC | Age: 49
End: 2024-09-09
Payer: COMMERCIAL

## 2024-09-09 NOTE — TELEPHONE ENCOUNTER
message sent to check dose.    Per 7/24 VV:  Muscle cramps. We discussed the possible causes of muscle cramps. I don't think it is connected to metformin but he could try cutting back down to 2 tablets daily.

## 2024-09-10 RX ORDER — FENOFIBRATE 48 MG/1
48 TABLET, COATED ORAL DAILY
Qty: 90 TABLET | Refills: 0 | Status: SHIPPED | OUTPATIENT
Start: 2024-09-10

## 2024-09-10 RX ORDER — METFORMIN HCL 500 MG
1000 TABLET, EXTENDED RELEASE 24 HR ORAL 2 TIMES DAILY WITH MEALS
Qty: 360 TABLET | Refills: 0 | Status: SHIPPED | OUTPATIENT
Start: 2024-09-10

## 2024-09-10 NOTE — TELEPHONE ENCOUNTER
Requested Prescriptions   Pending Prescriptions Disp Refills    fenofibrate (TRICOR) 48 MG tablet [Pharmacy Med Name: FENOFIBRATE 48MG TABS] 90 tablet 0     Sig: TAKE ONE TABLET BY MOUTH ONCE DAILY       Antihyperlipidemic agents Passed - 9/7/2024 11:15 AM        Passed - LDL on file in the past 12 months        Passed - Medication is active on med list        Passed - Recent (12 mo) or future (90 days) visit within the authorizing provider's specialty     The patient must have completed an in-person or virtual visit within the past 12 months or has a future visit scheduled within the next 90 days with the authorizing provider s specialty.  Urgent care and e-visits do not quality as an office visit for this protocol.          Passed - Patient is age 18 years or older          metFORMIN (GLUCOPHAGE XR) 500 MG 24 hr tablet [Pharmacy Med Name: METFORMIN HCL ER 500MG TB24] 360 tablet 1     Sig: TAKE TWO TABLETS BY MOUTH TWICE A DAY WITH MEALS       Biguanide Agents Passed - 9/7/2024 11:15 AM        Passed - Patient is age 10 or older        Passed - Patient has documented A1c within the specified period of time.     If HgbA1C is 8 or greater, it needs to be on file within the past 3 months.  If less than 8, must be on file within the past 6 months.     Recent Labs   Lab Test 07/09/24  0824   A1C 5.5             Passed - Patient does NOT have a diagnosis of CHF.        Passed - Medication is active on med list        Passed - Medication indicated for associated diagnosis     Medication is associated with one or more of the following diagnoses:     Gestational diabetes mellitus     Hyperinsulinar obesity     Hypersecretion of ovarian androgens    Non-alcoholic fatty liver    Polycystic ovarian syndrome               Pre-diabetes (DM 2 prevention)    Type 2 diabetes mellitus     Weight gain, antipsychotic therapy-induced    Impaired fasting glucose          Passed - Has GFR on file in past 12 months and most recent value  is normal        Passed - Recent (6 mo) or future (90 days) visit within the authorizing provider's specialty     The patient must have completed an in-person or virtual visit within the past 6 months or has a future visit scheduled within the next 90 days with the authorizing provider s specialty.  Urgent care and e-visits do not quality as an office visit for this protocol.

## 2024-09-10 NOTE — TELEPHONE ENCOUNTER
Pt is calling back to let Ashley know that he takes 1,000 mg in the morning and 1,000 mg at night. Please advise.

## 2024-09-19 ENCOUNTER — TELEPHONE (OUTPATIENT)
Dept: ENDOCRINOLOGY | Facility: CLINIC | Age: 49
End: 2024-09-19
Payer: COMMERCIAL

## 2024-09-19 DIAGNOSIS — E11.9 TYPE 2 DIABETES MELLITUS WITHOUT COMPLICATION, WITHOUT LONG-TERM CURRENT USE OF INSULIN (H): Primary | ICD-10-CM

## 2024-09-19 RX ORDER — BLOOD-GLUCOSE SENSOR
EACH MISCELLANEOUS
Qty: 6 EACH | Refills: 0 | Status: SHIPPED | OUTPATIENT
Start: 2024-09-19

## 2024-09-19 NOTE — TELEPHONE ENCOUNTER
The freestyle deya 3 sensors are on backorder. There is a new freestyle deya 3 plus sensor available that works with the freestyle deya 3 reader, but is not directly interchangable with the freestyle deya 3 sensors. Can we get a new rx so we can fill the new sensors?    Marking as hi-munira since the patient is due to swap sensors and is out.

## 2024-10-07 NOTE — PROGRESS NOTES
Outcome for 10/07/24 11:08 AM: [employer] message sent  Betty Gong LPN   Outcome for 10/18/24 11:54 AM: Left Voicemail   Tamera Cerrato MA  Outcome for 10/21/24 7:26 AM: Data obtained via [employer] website  aTmera Cerrato MA

## 2024-10-18 ENCOUNTER — TELEPHONE (OUTPATIENT)
Dept: ENDOCRINOLOGY | Facility: CLINIC | Age: 49
End: 2024-10-18
Payer: COMMERCIAL

## 2024-10-18 NOTE — TELEPHONE ENCOUNTER
Called patient and left voicemail. Patient has appointment on  10/22/24 . Need to collect 14 days of blood sugar readings if patient is using meter, or if patient is using CGM, need to get patients device uploaded to retrieve report for provider to review.  Tamera Cerrato MA

## 2024-10-22 ENCOUNTER — VIRTUAL VISIT (OUTPATIENT)
Dept: ENDOCRINOLOGY | Facility: CLINIC | Age: 49
End: 2024-10-22
Payer: COMMERCIAL

## 2024-10-22 DIAGNOSIS — E78.2 MIXED HYPERLIPIDEMIA: ICD-10-CM

## 2024-10-22 DIAGNOSIS — E11.9 TYPE 2 DIABETES MELLITUS WITHOUT COMPLICATION, WITHOUT LONG-TERM CURRENT USE OF INSULIN (H): Primary | ICD-10-CM

## 2024-10-22 PROCEDURE — 99214 OFFICE O/P EST MOD 30 MIN: CPT | Mod: 95 | Performed by: INTERNAL MEDICINE

## 2024-10-22 PROCEDURE — G2211 COMPLEX E/M VISIT ADD ON: HCPCS | Mod: 95 | Performed by: INTERNAL MEDICINE

## 2024-10-22 PROCEDURE — 95251 CONT GLUC MNTR ANALYSIS I&R: CPT | Performed by: INTERNAL MEDICINE

## 2024-10-22 RX ORDER — TIRZEPATIDE 12.5 MG/.5ML
12.5 INJECTION, SOLUTION SUBCUTANEOUS
Qty: 2 ML | Refills: 2 | Status: SHIPPED | OUTPATIENT
Start: 2024-10-22

## 2024-10-22 ASSESSMENT — PAIN SCALES - GENERAL: PAINLEVEL: NO PAIN (0)

## 2024-10-22 NOTE — PROGRESS NOTES
"THIS IS A VIDEO VISIT:    Phone call visit/virtual visit encounter:    Name of patient: Milan Carrillo    Date of encounter: 10/22/2024    Time of start of video visit: 9:01    Video started: 9:07    Video ended: 9:22    Provider location: working from home/ [facility]    Patient location: patients home.    Mode of transmission: [employer] video/ Radius    Verbal consent: obtained before starting visit. Pt is agreeable.      The patient has been notified of following:      \"This VIDEO visit will be conducted via a call between you and your physician/provider. We have found that certain health care needs can be provided without the need for a physical exam.  This service lets us provide the care you need with a short phone conversation.  If a prescription is necessary we can send it directly to your pharmacy.  If lab work is needed we can place an order for that and you can then stop by our lab to have the test done at a later time.     With new updates with corona virus patient might be billed as clinic visit.     If during the course of the call the physician/provider feels a telephone visit is not appropriate, you will not be charged for this service.\"      Past medical history, social history, family history, allergy and medications were reviewed and updated as appropriate.  Reviewed pertinent labs, notes, imaging studies personally.      ENDOCRINOLOGY CLINIC NOTE:  Name: Milan Carrillo  Seen for f/u of type 2 Diabetes.  HPI:  Milan Carrillo is a 49 year old male who presents for the evaluation/management of Diabetes.   has a past medical history of Arthritis, Hyperlipidemia, Hypertension, Kidney stone, and Sleep apnea.    Wt loss- 42 over last 2 years.  He was switched from Ozempic to Munjaro for wt loss benefit.  (He reports that when he was on Ozempic 2 mg/week he had weight gain)  Since starting Mounjaro he was able to lose some weight but is not back to the weight when he was taking Ozempic 1 mg/week.  He " is tolerating Mounjaro okay.    He is using trista.  Wt Readings from Last 2 Encounters:   08/29/24 113.4 kg (250 lb)   09/05/23 110.7 kg (244 lb)     Earlier he has questions about testosterone-- last 2022 check in range. Based on that he does not need to be on replacement.  Feeling tired. Discussed that fatigue can be multifactorial.  Also recommend to establish with primary care provider.    1. Type 2 DM:  Orginally diagnosed at the age of: 46.  Current Regimen:   Metformin XR 1000 mg AM and 1000 mg PM.  Munjaro 10 mg/week. (On this dose for last 6 weeks)    yes:     Diabetes Medication(s)       Biguanides       metFORMIN (GLUCOPHAGE XR) 500 MG 24 hr tablet TAKE TWO TABLETS BY MOUTH TWICE A DAY WITH MEALS       Incretin Mimetic Agents       tirzepatide (MOUNJARO) 10 MG/0.5ML pen Inject 10 mg subcutaneously every 7 days.        Not able to tolerate higher dose of regular metformin 2/2 to GI s/e. Tolerating XR well.  Takes regular metformin.    BS checks: Trista  Average Meter Download: see [employer] message  Exercise: limited. Feels fatigued.  Last A1c:   Symptoms of hypoglycemia (low blood sugar):  Gets symptoms of hypoglycemia.  Episodes of hypoglycemia:    DM Complications:   Complications:   Diabetes Complications  Description / Detail    Diabetic Retinopathy  No   CAD / PAD  No   Neuropathy  + Dm neuropathy   Nephropathy / Microalbuminuria  No    Gastroparesis  No   Hypoglycemia [place]  No       2. Hypertension:    Not on medications.  3. Hyperlipidemia: Not on medications. Noted to have high TG.    PMH/PSH:  Past Medical History:   Diagnosis Date    Arthritis     Hyperlipidemia     Hypertension     Kidney stone     Sleep apnea      Past Surgical History:   Procedure Laterality Date    ARTHROSCOPY SHOULDER ROTATOR CUFF REPAIR Right     ENT SURGERY      EYE SURGERY      HERNIA REPAIR      HERNIORRHAPHY UMBILICAL N/A 5/29/2020    Procedure: OPEN UMBILICAL HERNIA REPAIR WITH  MESH;  Surgeon: Curt Ramachandran  MD Randall;  Location:  OR    ORTHOPEDIC SURGERY      R shoulder    RECESSION RESECTION (REPAIR STRABISMUS)  10/1/2012    Procedure: RECESSION RESECTION (REPAIR STRABISMUS);  RIGHT STRABISMUS REPAIR;  Surgeon: Joanie Gurrola MD;  Location:  [facility]    RHINOPLASTY      STRABISMUS SURGERY       Family Hx:  Family History   Problem Relation Age of Onset    Glaucoma No family hx of     Macular Degeneration No family hx of     Pulmonary fibrosis Mother     No Known Problems Father     Hypertension Maternal Grandmother     Diabetes No family hx of     Cancer No family hx of        Diabetes:    Social Hx:  Social History     Socioeconomic History    Marital status:      Spouse name: Not on file    Number of children: Not on file    Years of education: Not on file    Highest education level: Not on file   Occupational History    Not on file   Tobacco Use    Smoking status: Never    Smokeless tobacco: Never   Substance and Sexual Activity    Alcohol use: Not Currently     Comment: Alcoholic Drinks/day: 2/month    Drug use: Never    Sexual activity: Not Currently   Other Topics Concern    Parent/sibling w/ CABG, MI or angioplasty before 65F 55M? Not Asked   Social History Narrative    Not on file     Social Determinants of Health     Financial Resource Strain: Not on file   Food Insecurity: Not on file   Transportation Needs: Not on file   Physical Activity: Not on file   Stress: Not on file   Social Connections: Not on file   Interpersonal Safety: Not on file   Housing Stability: Not on file          MEDICATIONS:  has a current medication list which includes the following prescription(s): freestyle deya 3 reader, freestyle deya 3 plus sensor, freestyle deya 3 sensor, contour next test, fenofibrate, metformin, sildenafil, tirzepatide, and vitamin d2.    ROS     ROS: 10 point ROS neg other than the symptoms noted above in the HPI.    Physical Exam   VS: There were no vitals taken for this visit.  GENERAL:  healthy, alert and no distress  EYES: Eyes grossly normal to inspection, conjunctivae and sclerae normal  ENT: no nose swelling, nasal discharge.  Thyroid: no apparent thyroid nodules  RESP: no audible wheeze, cough, or visible cyanosis.  No visible retractions or increased work of breathing.  Able to speak fully in complete sentences.  ABDO: not evaluated.  EXTREMITIES: no hand tremors.  NEURO: Cranial nerves grossly intact, mentation intact and speech normal  SKIN: No apparent skin lesions, rash or edema seen   PSYCH: mentation appears normal, affect normal/bright, judgement and insight intact, normal speech and appearance well-groomed    LABS:  A1c:  Lab Results   Component Value Date    A1C 5.5 07/09/2024    A1C 5.4 02/26/2024    A1C 5.7 08/30/2023    A1C 5.7 03/21/2023    A1C 6.0 09/21/2022    A1C 5.4 05/30/2017    A1C 5.7 02/24/2016    A1C 5.2 06/04/2010     Creatinine:  Creatinine   Date Value Ref Range Status   07/09/2024 1.27 (H) 0.67 - 1.17 mg/dL Final   04/15/2020 1.08 0.66 - 1.25 mg/dL Final     Urine Micro:  Lab Results   Component Value Date    UMALCR  02/26/2024      Comment:      Unable to calculate, urine albumin and/or urine creatinine is outside detectable limits.  Microalbuminuria is defined as an albumin:creatinine ratio of 17 to 299 for males and 25 to 299 for females. A ratio of albumin:creatinine of 300 or higher is indicative of overt proteinuria.  Due to biologic variability, positive results should be confirmed by a second, first-morning random or 24-hour timed urine specimen. If there is discrepancy, a third specimen is recommended. When 2 out of 3 results are in the microalbuminuria range, this is evidence for incipient nephropathy and warrants increased efforts at glucose control, blood pressure control, and institution of therapy with an angiotensin-converting-enzyme (ACE) inhibitor (if the patient can tolerate it).          LFTs/Lipids:  Recent Labs   Lab Test 07/09/24 0824  02/26/24  0803 06/07/21  1001 05/30/17  1703   CHOL 165 159   < > 206.7*   HDL 31* 33*   < > 35.1*   LDL 76 96   < > 124   TRIG 290* 152*   < > 237.5*   CHOLHDLRATIO  --   --   --  5.9*    < > = values in this interval not displayed.       TFTs:  TSH   Date Value Ref Range Status   06/09/2022 3.04 0.30 - 5.00 uIU/mL Final   09/18/2015 2.52 0.40 - 4.00 mU/L Final       All pertinent notes, labs, and images personally reviewed by me.     Glucometer/ insulin pump (if applicable)/ CGM data (if applicable) downloaded, Personally reviewed and interpreted.  All Blood sugar data reviewed personally and discussed with pt.      A/P  Mr.David JODY Carrillo is a 48 year old here for the evaluation/management of diabetes:    1. DM2 - Under Good control. A1c 5.4%.   He is using deya.  In general BG in rage.  Average blood sugar is 91 with TIR 97%.  Few episodes of low blood sugar noted but likely falsely low (it is happening when he is sleeping on the side of sensor)  Plan:  Discussed diagnosis, pathophysiology, management and treatment options of condition with pt.  Labs needed in next few days-few weeks.  Continue metformin XR 1000 mg twice a day  Increase Mounjaro to 12.5 mg once a week  Let us know how you are doing on higher dose.  Can consider to increase Mounjaro to 15 mg once a week after 4-6 weeks.  Follow up with Lexi LEÓN in 4-5 months with labs prior.  Repeat labs and follow up with  in 4-5 month after that.  Please make a lab appointment for blood work and follow up clinic appointment in 1 week after that to discuss results.     2. Hypertension - Under Good control.  Not on medication.    3. Hyperlipidemia - Under fair control.  LDL 96.  He was started on pravastatin but he never picked up the prescription.  For high triglyceride levels he is on fibrate 48 mg/day. Tg improved.  Previously discussed starting stating- pravastatin but he would like to hold off as LDL is in range and he would like to focus  on medication that will target triglycerides.  Continue fenofibrate at current dose.  Recheck fasting labs in 4-5 months.    4. Prevention:  Opthalmology-recommend annual  ASA-not indicated secondary to age  Smoking-no    Foot exam: 6/2022.    Most Recent Immunizations   Administered Date(s) Administered    COVID-19 MONOVALENT 12+ (Pfizer) 10/06/2021    Influenza (IIV3) PF 09/27/2012    Influenza (intradermal) 10/24/2012    Influenza Vaccine >6 months,quad, PF 09/13/2021    TDAP (Adacel,Boostrix) 02/07/2019    TDAP Vaccine (Adacel) 02/07/2019    TDAP Vaccine (Boostrix) 09/27/2012     Plan: Albumin Random Urine Quantitative with Creat         Ratio, Creatinine, Hemoglobin A1c, Lipid panel         reflex to direct LDL Fasting, Vitamin D         Deficiency, TSH with free T4 reflex, MOUNJARO         12.5 MG/0.5ML SOAJ, Hemoglobin A1c, GLUCOSE         MONITOR, 72 HOUR, PHYS INTERP, Hemoglobin A1c     Recommend checking blood sugars before meals and at bedtime.    If Blood glucose are low more often-> 2-3 times/week- give us a call.  The patient is advised to Make better food choices: reduce carbs, Reduce portion size, weight loss and exercise 3-4 times a week.  Discussed hypoglycemia signs and symptoms as well as management in detail.    There is some variability among people, most will usually develop symptoms suggestive of hypoglycemia when blood glucose levels are lowered to the mid 60's. The first set of symptoms are called adrenergic. Patients may experience any of the following nervousness, sweating, intense hunger, trembling, weakness, palpitations, and difficulty speaking.   The acute management of hypoglycemia involves the rapid delivery of a source of easily absorbed sugar. Regular soda, juice, lifesavers, table sugar, are good options. 15 grams of glucose is the dose that is given, followed by an assessment of symptoms and a blood glucose check if possible. If after 10 minutes there is no improvement, another  10-15 grams should be given. This can be repeated up to three times. The equivalency of 10-15 grams of glucose (approximate servings) are: Four lifesavers, 4 teaspoons of sugar, or 1/2 can of regular soda or juice.     Munjaro (Tirzepatide)- It si Glucose-Dependent Insulinotropic Polypeptide (GIP)/Glucagon-Like Peptide (GLP-1) Receptor Agonist.  Dose: Initial: 2.5 mg once weekly (subcutaneoulsy) for 4 weeks, then increase to 5 mg once weekly. Then can increase dose gradually by 2.5 mg/week every 4 weeks if needed to achieve glycemic goals (maximum weekly dose: 15 mg/week).    Possible s/e: Most common possible side effects include gastrointestinal symptoms like abdominal pain, constipation, decreased appetite, diarrhea, nausea and vomiting.  Other possible side effects include acute kidney injury, diabetic retinopathy (it can worsen pre-existing diabetic retinopathy), gallbladder disease like gallstones, cholecystitis, biliary colic, hypersensitivity reactions, palpitations (increased heart rate).  Cases of acute pancreatitis  (inflammation of pancreas) have been observed.  Tirzepatide causes dose-dependent and treatment-duration-dependent thyroid C-cell tumors at clinically relevant exposures in rats. It is unknown whether tirzepatide causes thyroid C-cell tumor in humans.    Discussed indications, risks and benefits of all medications prescribed, and answered questions to patient's satisfaction.  The longitudinal plan of care for the diagnosis(es)/condition(s) as documented were addressed during this visit. Due to the added complexity in care, I will continue to support Milan in the subsequent management and with ongoing continuity of care.  All questions were answered.  The patient indicates understanding of the above issues and agrees with the plan set forth.     Follow-up:  As noted in AVS    Lola Babcock M.D  Endocrinology  [facility]/[place]  CC: Sandor Sanford    Disclaimer: This note consists of  symbols derived from keyboarding, dictation and/or voice recognition software. As a result, there may be errors in the script that have gone undetected. Please consider this when interpreting information found in this chart.    Addendum to above note and clinic visit:    Labs reviewed.    See result note/telephone encounter.

## 2024-10-22 NOTE — LETTER
"10/22/2024      Milan Carrillo  [address] [facility] [address]      Dear Colleague,    Thank you for referring your patient, Milan Carrillo, to the [facility]. Please see a copy of my visit note below.    Outcome for 10/07/24 11:08 AM: [employer] message sent  Betty Gong LPN   Outcome for 10/18/24 11:54 AM: Left Voicemail   Tamera Cerrato MA  Outcome for 10/21/24 7:26 AM: Data obtained via Eachpal website  Tamera Cerrato MA            THIS IS A VIDEO VISIT:    Phone call visit/virtual visit encounter:    Name of patient: Milan Carrillo    Date of encounter: 10/22/2024    Time of start of video visit: 9:01    Video started: 9:07    Video ended: 9:22    Provider location: working from home/ [facility]    Patient location: patients home.    Mode of transmission: BioNova video/ Platfora    Verbal consent: obtained before starting visit. Pt is agreeable.      The patient has been notified of following:      \"This VIDEO visit will be conducted via a call between you and your physician/provider. We have found that certain health care needs can be provided without the need for a physical exam.  This service lets us provide the care you need with a short phone conversation.  If a prescription is necessary we can send it directly to your pharmacy.  If lab work is needed we can place an order for that and you can then stop by our lab to have the test done at a later time.     With new updates with corona virus patient might be billed as clinic visit.     If during the course of the call the physician/provider feels a telephone visit is not appropriate, you will not be charged for this service.\"      Past medical history, social history, family history, allergy and medications were reviewed and updated as appropriate.  Reviewed pertinent labs, notes, imaging studies personally.      ENDOCRINOLOGY CLINIC NOTE:  Name: Milan Carrillo  Seen for f/u of type 2 Diabetes.  HPI:  Milan Carrillo is " a 49 year old male who presents for the evaluation/management of Diabetes.   has a past medical history of Arthritis, Hyperlipidemia, Hypertension, Kidney stone, and Sleep apnea.    Wt loss- 42 over last 2 years.  He was switched from Ozempic to Munjaro for wt loss benefit.  (He reports that when he was on Ozempic 2 mg/week he had weight gain)  Since starting Mounjaro he was able to lose some weight but is not back to the weight when he was taking Ozempic 1 mg/week.  He is tolerating Mounjaro okay.    He is using trista.  Wt Readings from Last 2 Encounters:   08/29/24 113.4 kg (250 lb)   09/05/23 110.7 kg (244 lb)     Earlier he has questions about testosterone-- last 2022 check in range. Based on that he does not need to be on replacement.  Feeling tired. Discussed that fatigue can be multifactorial.  Also recommend to establish with primary care provider.    1. Type 2 DM:  Orginally diagnosed at the age of: 46.  Current Regimen:   Metformin XR 1000 mg AM and 1000 mg PM.  Munjaro 10 mg/week. (On this dose for last 6 weeks)    yes:     Diabetes Medication(s)       Biguanides       metFORMIN (GLUCOPHAGE XR) 500 MG 24 hr tablet TAKE TWO TABLETS BY MOUTH TWICE A DAY WITH MEALS       Incretin Mimetic Agents       tirzepatide (MOUNJARO) 10 MG/0.5ML pen Inject 10 mg subcutaneously every 7 days.        Not able to tolerate higher dose of regular metformin 2/2 to GI s/e. Tolerating XR well.  Takes regular metformin.    BS checks: Trista  Average Meter Download: see InHomeVest message  Exercise: limited. Feels fatigued.  Last A1c:   Symptoms of hypoglycemia (low blood sugar):  Gets symptoms of hypoglycemia.  Episodes of hypoglycemia:    DM Complications:   Complications:   Diabetes Complications  Description / Detail    Diabetic Retinopathy  No   CAD / PAD  No   Neuropathy  + Dm neuropathy   Nephropathy / Microalbuminuria  No    Gastroparesis  No   Hypoglycemia Unawarness  No       2. Hypertension:    Not on medications.  3.  Hyperlipidemia: Not on medications. Noted to have high TG.    PMH/PSH:  Past Medical History:   Diagnosis Date     Arthritis      Hyperlipidemia      Hypertension      Kidney stone      Sleep apnea      Past Surgical History:   Procedure Laterality Date     ARTHROSCOPY SHOULDER ROTATOR CUFF REPAIR Right      ENT SURGERY       EYE SURGERY       HERNIA REPAIR       HERNIORRHAPHY UMBILICAL N/A 5/29/2020    Procedure: OPEN UMBILICAL HERNIA REPAIR WITH  MESH;  Surgeon: Curt Ramachandran MD;  Location:  OR     ORTHOPEDIC SURGERY      R shoulder     RECESSION RESECTION (REPAIR STRABISMUS)  10/1/2012    Procedure: RECESSION RESECTION (REPAIR STRABISMUS);  RIGHT STRABISMUS REPAIR;  Surgeon: Joanie Gurrola MD;  Location:  EC     RHINOPLASTY       STRABISMUS SURGERY       Family Hx:  Family History   Problem Relation Age of Onset     Glaucoma No family hx of      Macular Degeneration No family hx of      Pulmonary fibrosis Mother      No Known Problems Father      Hypertension Maternal Grandmother      Diabetes No family hx of      Cancer No family hx of        Diabetes:    Social Hx:  Social History     Socioeconomic History     Marital status:      Spouse name: Not on file     Number of children: Not on file     Years of education: Not on file     Highest education level: Not on file   Occupational History     Not on file   Tobacco Use     Smoking status: Never     Smokeless tobacco: Never   Substance and Sexual Activity     Alcohol use: Not Currently     Comment: Alcoholic Drinks/day: 2/month     Drug use: Never     Sexual activity: Not Currently   Other Topics Concern     Parent/sibling w/ CABG, MI or angioplasty before 65F 55M? Not Asked   Social History Narrative     Not on file     Social Determinants of Health     Financial Resource Strain: Not on file   Food Insecurity: Not on file   Transportation Needs: Not on file   Physical Activity: Not on file   Stress: Not on file   Social Connections: Not  on file   Interpersonal Safety: Not on file   Housing Stability: Not on file          MEDICATIONS:  has a current medication list which includes the following prescription(s): freestyle deya 3 reader, freestyle deya 3 plus sensor, freestyle deya 3 sensor, contour next test, fenofibrate, metformin, sildenafil, tirzepatide, and vitamin d2.    ROS     ROS: 10 point ROS neg other than the symptoms noted above in the HPI.    Physical Exam   VS: There were no vitals taken for this visit.  GENERAL: healthy, alert and no distress  EYES: Eyes grossly normal to inspection, conjunctivae and sclerae normal  ENT: no nose swelling, nasal discharge.  Thyroid: no apparent thyroid nodules  RESP: no audible wheeze, cough, or visible cyanosis.  No visible retractions or increased work of breathing.  Able to speak fully in complete sentences.  ABDO: not evaluated.  EXTREMITIES: no hand tremors.  NEURO: Cranial nerves grossly intact, mentation intact and speech normal  SKIN: No apparent skin lesions, rash or edema seen   PSYCH: mentation appears normal, affect normal/bright, judgement and insight intact, normal speech and appearance well-groomed    LABS:  A1c:  Lab Results   Component Value Date    A1C 5.5 07/09/2024    A1C 5.4 02/26/2024    A1C 5.7 08/30/2023    A1C 5.7 03/21/2023    A1C 6.0 09/21/2022    A1C 5.4 05/30/2017    A1C 5.7 02/24/2016    A1C 5.2 06/04/2010     Creatinine:  Creatinine   Date Value Ref Range Status   07/09/2024 1.27 (H) 0.67 - 1.17 mg/dL Final   04/15/2020 1.08 0.66 - 1.25 mg/dL Final     Urine Micro:  Lab Results   Component Value Date    UMALCR  02/26/2024      Comment:      Unable to calculate, urine albumin and/or urine creatinine is outside detectable limits.  Microalbuminuria is defined as an albumin:creatinine ratio of 17 to 299 for males and 25 to 299 for females. A ratio of albumin:creatinine of 300 or higher is indicative of overt proteinuria.  Due to biologic variability, positive results should  be confirmed by a second, first-morning random or 24-hour timed urine specimen. If there is discrepancy, a third specimen is recommended. When 2 out of 3 results are in the microalbuminuria range, this is evidence for incipient nephropathy and warrants increased efforts at glucose control, blood pressure control, and institution of therapy with an angiotensin-converting-enzyme (ACE) inhibitor (if the patient can tolerate it).          LFTs/Lipids:  Recent Labs   Lab Test 07/09/24  0824 02/26/24  0803 06/07/21  1001 05/30/17  1703   CHOL 165 159   < > 206.7*   HDL 31* 33*   < > 35.1*   LDL 76 96   < > 124   TRIG 290* 152*   < > 237.5*   CHOLHDLRATIO  --   --   --  5.9*    < > = values in this interval not displayed.       TFTs:  TSH   Date Value Ref Range Status   06/09/2022 3.04 0.30 - 5.00 uIU/mL Final   09/18/2015 2.52 0.40 - 4.00 mU/L Final       All pertinent notes, labs, and images personally reviewed by me.     Glucometer/ insulin pump (if applicable)/ CGM data (if applicable) downloaded, Personally reviewed and interpreted.  All Blood sugar data reviewed personally and discussed with pt.      A/P  Mr.David JODY Carrillo is a 48 year old here for the evaluation/management of diabetes:    1. DM2 - Under Good control. A1c 5.4%.   He is using deya.  In general BG in rage.  Average blood sugar is 91 with TIR 97%.  Few episodes of low blood sugar noted but likely falsely low (it is happening when he is sleeping on the side of sensor)  Plan:  Discussed diagnosis, pathophysiology, management and treatment options of condition with pt.  Labs needed in next few days-few weeks.  Continue metformin XR 1000 mg twice a day  Increase Mounjaro to 12.5 mg once a week  Let us know how you are doing on higher dose.  Can consider to increase Mounjaro to 15 mg once a week after 4-6 weeks.  Follow up with Lexi LEÓN in 4-5 months with labs prior.  Repeat labs and follow up with  in 4-5 month after that.  Please make a  lab appointment for blood work and follow up clinic appointment in 1 week after that to discuss results.     2. Hypertension - Under Good control.  Not on medication.    3. Hyperlipidemia - Under fair control.  LDL 96.  He was started on pravastatin but he never picked up the prescription.  For high triglyceride levels he is on fibrate 48 mg/day. Tg improved.  Previously discussed starting stating- pravastatin but he would like to hold off as LDL is in range and he would like to focus on medication that will target triglycerides.  Continue fenofibrate at current dose.  Recheck fasting labs in 4-5 months.    4. Prevention:  Opthalmology-recommend annual  ASA-not indicated secondary to age  Smoking-no    Foot exam: 6/2022.    Most Recent Immunizations   Administered Date(s) Administered     COVID-19 MONOVALENT 12+ (Pfizer) 10/06/2021     Influenza (IIV3) PF 09/27/2012     Influenza (intradermal) 10/24/2012     Influenza Vaccine >6 months,quad, PF 09/13/2021     TDAP (Adacel,Boostrix) 02/07/2019     TDAP Vaccine (Adacel) 02/07/2019     TDAP Vaccine (Boostrix) 09/27/2012     Plan: Albumin Random Urine Quantitative with Creat         Ratio, Creatinine, Hemoglobin A1c, Lipid panel         reflex to direct LDL Fasting, Vitamin D         Deficiency, TSH with free T4 reflex, MOUNJARO         12.5 MG/0.5ML SOAJ, Hemoglobin A1c, GLUCOSE         MONITOR, 72 HOUR, PHYS INTERP, Hemoglobin A1c     Recommend checking blood sugars before meals and at bedtime.    If Blood glucose are low more often-> 2-3 times/week- give us a call.  The patient is advised to Make better food choices: reduce carbs, Reduce portion size, weight loss and exercise 3-4 times a week.  Discussed hypoglycemia signs and symptoms as well as management in detail.    There is some variability among people, most will usually develop symptoms suggestive of hypoglycemia when blood glucose levels are lowered to the mid 60's. The first set of symptoms are called  adrenergic. Patients may experience any of the following nervousness, sweating, intense hunger, trembling, weakness, palpitations, and difficulty speaking.   The acute management of hypoglycemia involves the rapid delivery of a source of easily absorbed sugar. Regular soda, juice, lifesavers, table sugar, are good options. 15 grams of glucose is the dose that is given, followed by an assessment of symptoms and a blood glucose check if possible. If after 10 minutes there is no improvement, another 10-15 grams should be given. This can be repeated up to three times. The equivalency of 10-15 grams of glucose (approximate servings) are: Four lifesavers, 4 teaspoons of sugar, or 1/2 can of regular soda or juice.     Munjaro (Tirzepatide)- It si Glucose-Dependent Insulinotropic Polypeptide (GIP)/Glucagon-Like Peptide (GLP-1) Receptor Agonist.  Dose: Initial: 2.5 mg once weekly (subcutaneoulsy) for 4 weeks, then increase to 5 mg once weekly. Then can increase dose gradually by 2.5 mg/week every 4 weeks if needed to achieve glycemic goals (maximum weekly dose: 15 mg/week).    Possible s/e: Most common possible side effects include gastrointestinal symptoms like abdominal pain, constipation, decreased appetite, diarrhea, nausea and vomiting.  Other possible side effects include acute kidney injury, diabetic retinopathy (it can worsen pre-existing diabetic retinopathy), gallbladder disease like gallstones, cholecystitis, biliary colic, hypersensitivity reactions, palpitations (increased heart rate).  Cases of acute pancreatitis  (inflammation of pancreas) have been observed.  Tirzepatide causes dose-dependent and treatment-duration-dependent thyroid C-cell tumors at clinically relevant exposures in rats. It is unknown whether tirzepatide causes thyroid C-cell tumor in humans.    Discussed indications, risks and benefits of all medications prescribed, and answered questions to patient's satisfaction.  The longitudinal plan of  care for the diagnosis(es)/condition(s) as documented were addressed during this visit. Due to the added complexity in care, I will continue to support Milan in the subsequent management and with ongoing continuity of care.  All questions were answered.  The patient indicates understanding of the above issues and agrees with the plan set forth.     Follow-up:  As noted in AVS    Lola Babcock M.D  Endocrinology  [facility]  CC: Sandor Sanford    Disclaimer: This note consists of symbols derived from keyboarding, dictation and/or voice recognition software. As a result, there may be errors in the script that have gone undetected. Please consider this when interpreting information found in this chart.    Addendum to above note and clinic visit:    Labs reviewed.    See result note/telephone encounter.      Again, thank you for allowing me to participate in the care of your patient.        Sincerely,        Lola Babcock MD

## 2024-10-22 NOTE — NURSING NOTE
Is the patient currently in the state of [place]? YES    Visit mode:VIDEO    If the visit is dropped, the patient can be reconnected by: VIDEO VISIT: Text to cell phone:   Telephone Information:   Mobile [phone]       Will anyone else be joining the visit? NO  (If patient encounters technical issues they should call [phone]    How would you like to obtain your AVS? MyChart    Are changes needed to the allergy or medication list? No    Are refills needed on medications prescribed by this physician? NO    Reason for visit: Follow Up    Jo Ann CASTORENA

## 2024-10-22 NOTE — PATIENT INSTRUCTIONS
[facility]  Dr Babcock, Endocrinology Department    [facility]   [address] [facility]  [place] [address]  Appointment [phone]  Fax: [phone]  [place]: Monday - Thursday      To provide the best diabetic care, please bring your blood glucose meter to each and every visit with your Endocrinologist.  Your blood glucose meter/insulin pump will be downloaded at every appointment.    Please arrive 15 minutes before your scheduled appointment.  This will allow for your blood glucose meter/insulin pump to be downloaded.  If you are wearing DEXCOM please bring  or sharing code so that it can be downloaded.  If you are using freestyle deya personal sensors please bring the reader.  If you are using TANDEM insulin pump please have your username and password to get info from Tandem website.    Labs needed in next few days-few weeks.  Continue metformin XR 1000 mg twice a day  Increase Mounjaro to 12.5 mg once a week  Let us know how you are doing on higher dose.  Can consider to increase Mounjaro to 15 mg once a week after 4-6 weeks.    Follow up with Lexi LEÓN in 4-5 months with labs prior. (Recommend fasting labs prior to that)  Repeat labs and follow up with  in 4-5 month after that.  Please make a lab appointment for blood work and follow up clinic appointment in 1 week after that to discuss results. (Recommend in person visit)    Recommend checking blood sugars before meals and at bedtime.    If Blood glucose are low more often-> 2-3 times/week- give us a call.  Make better food choices: reduce carbs, Reduce portion size, weight loss and exercise 3-4 times a week.    What is hypoglycemia:  Hypoglycemia is when blood sugar levels become too low - below 70 m/dl.      What causes hypoglycemia?  - using too much insulin  -taking too many diabetes pills  -not eating enough, or skipping meals or snacks  -not eating enough carbohydrate  with meals  -changing your exercise routine  -drinking alcohol in excess    It is also possible to have hypoglycemia even when you are carefully managing your blood sugar levels.    What does it feel like when blood sugars get too low?  You may feel:  - anxious  -confused  -dizzy  -hungry  -light-headed  -nervous  -shaky  -sleepy  -sweaty    You may have  -blurred or cloudy vision  -heart palpitations (heart skips a beat or races)  -tingling or numbness around the mouth and tongue  -tremors    What to do if you have symptoms of hypoglycmemia:  If you think your blood sugar is too low, check it with a glucose meter.  If its below 70 mg/dl, consume one of the following:  Fruit juice (1/2 cup)  Glucose tablets (15 grams)  Hard candy (5 to 7 pieces)  Honey or sugar (2 teaspoons)  Milk (1/2 cup)  Soft drink (non-diet, 1/2 cup)    Wait 15 minutes and check your blood glucose again.  IF it is still below 70 mg/dl, have another food item listed above. Wait another 15 minutes and repeat the blood glucose test.  Have a small meal or snack that contains some carbohydrate after your blood glucose rises above 70 mg/dl.    If you are at risk of hypoglycemia, always carry with you glucose tablets or one of the foods listed above.      To prevent Hypoglycemia:  Avoid situations that may cause hypoglycemia  Before making any change to your diet or exercise routine, discuss them with your healthcare provider  Keep a record of your blood glucose levels.  Include the time of day, diabetes medications, when you had your last meal or snack, and what you were doing at the time (e.g. Watching TV, gardening, jogging, etc).    Talk to your healthcare provider if your blood glucose levels are often low        Patient guide on hypoglycemia    http://www.hormone.org/Resources/upload/patient-guide-diagnosis-and-management-hypoglycemia-[email or web address].[email or web address]

## 2024-11-02 ENCOUNTER — OFFICE VISIT (OUTPATIENT)
Dept: URGENT CARE | Facility: URGENT CARE | Age: 49
End: 2024-11-02
Payer: COMMERCIAL

## 2024-11-02 ENCOUNTER — NURSE TRIAGE (OUTPATIENT)
Dept: NURSING | Facility: CLINIC | Age: 49
End: 2024-11-02
Payer: COMMERCIAL

## 2024-11-02 VITALS
HEART RATE: 80 BPM | OXYGEN SATURATION: 95 % | TEMPERATURE: 97.2 F | RESPIRATION RATE: 20 BRPM | DIASTOLIC BLOOD PRESSURE: 90 MMHG | SYSTOLIC BLOOD PRESSURE: 132 MMHG

## 2024-11-02 DIAGNOSIS — E11.9 TYPE 2 DIABETES MELLITUS WITHOUT COMPLICATION, WITHOUT LONG-TERM CURRENT USE OF INSULIN (H): ICD-10-CM

## 2024-11-02 DIAGNOSIS — T80.29XA: Primary | ICD-10-CM

## 2024-11-02 PROCEDURE — 99213 OFFICE O/P EST LOW 20 MIN: CPT

## 2024-11-02 NOTE — PROGRESS NOTES
Assessment & Plan     Infection following injection  Type 2 diabetes mellitus without complication, without long-term current use of insulin (H)   Concern for possibility developing infection given immunization was a week ago reaction to vaccine and history of type 2 diabetes treated for possible infection. Return precautions as below.   - amoxicillin-clavulanate (AUGMENTIN) 875-125 MG tablet  Dispense: 10 tablet; Refill: 0    Follow up as needed if not improving.     Patient Instructions   Please go to Emergency Department if worsening pain, redness, drainage, or fevers.       Gerri Osborne MD  [facility] U[facility] [place]    Jazmine Yates is a 49 year old male who presents to clinic today for the following health issues:  Chief Complaint   Patient presents with    Urgent Care     Flu shot Lt deloid x Monday. Pt states noticed itching and burning sensation on 1300 today, warm to touch, pain radiates to triceps.          3/28/2022     8:27 AM   Additional Questions   Roomed by [facility]     HPI    Monday 10/28/24 flu shot.   Itching and burning pain in arm right where he had his flu shot 5 days ago. Painful to touch.   Called the nurse line - told he could have an infection and should go in to get antibiotics.   No weakness.     Has T2DM which is well controlled.   Not happened before. No medications taken for the pain.         Review of Systems  Constitutional, skin, MSK, are negative, except as otherwise noted.      Objective    BP (!) 132/90   Pulse 80   Temp 97.2  F (36.2  C) (Tympanic)   Resp 20   SpO2 95%   Physical Exam   GENERAL: alert and no distress  NECK: supple   RESP: breathing comfortably.   CV:  no peripheral edema  MS: 5/5 strength. Tenderness on palpation of the detoid with mild redness. No fluctuant mass. Some edema noted.   Skin: Mild redness.

## 2024-11-02 NOTE — TELEPHONE ENCOUNTER
Nurse Triage SBAR    Is this a 2nd Level Triage? NO    Situation: Vaccine reaction    Background: Pt got flu shot through occupational health on Monday. Pt is now feeling increased pain, redness and itching from injection site    Assessment: Denies fever, SOB or breathing issues. Does endorse small amount of redness spreading from area and pain    Protocol Recommended Disposition:   Call PCP Within 24 Hours    Recommendation: Recommended HCP visit in next 24 hours. Pt will attempt to be seen at [facility] again, but if not is willing to go to  today for ABX treatment         Does the patient meet one of the following criteria for ADS visit consideration? 16+ years old, with an [facility] PCP     TIP  Providers, please consider if this condition is appropriate for management at one of our Acute and Diagnostic Services sites.     If patient is a good candidate, please use dotphrase <dot>triageresponse and select Refer to ADS to document.      Reason for Disposition   [1] Redness or red streak around the injection site AND [2] begins > 48 hours after shot AND [3] no fever  (Exception: Red area < 1 inch or 2.5 cm wide.)    Additional Information   Negative: [1] Difficulty breathing or swallowing AND [2] starts within 2 hours after injection   Negative: Difficult to awaken or acting confused (e.g., disoriented, slurred speech)   Negative: Unresponsive, passed out, or very weak   Negative: Sounds like a life-threatening emergency to the triager   Negative: Fever > 104 F (40 C)   Negative: [1] Measles vaccine rash (onset day 6-12) AND [2] purple or blood-colored   Negative: Sounds like a severe, unusual reaction to the triager   Negative: [1] Redness or red streak around the injection site AND [2] begins > 48 hours after shot AND [3] fever   Negative: [1] Fever > 101 F (38.3 C) AND [2] age > 60 years AND [3] begins > 48 hours after getting vaccine   Negative: [1] Fever > 100 F (37.8 C) AND [2] bedridden (e.g., CVA, chronic  illness, recovering from surgery)   Negative: [1] Fever > 100 F (37.8 C) AND [2] diabetes mellitus or weak immune system (e.g., HIV positive, cancer chemo, splenectomy, organ transplant, chronic steroids)   Negative: Fever present > 3 days (72 hours)   Negative: [1] Smallpox vaccine and [2] eye pain, eye redness, or rash on eyelids    Protocols used: Immunization [id number]

## 2024-11-10 ENCOUNTER — HEALTH MAINTENANCE LETTER (OUTPATIENT)
Age: 49
End: 2024-11-10

## 2024-11-15 ENCOUNTER — HOSPITAL ENCOUNTER (EMERGENCY)
Facility: CLINIC | Age: 49
Discharge: HOME | End: 2024-11-15
Attending: STUDENT IN AN ORGANIZED HEALTH CARE EDUCATION/TRAINING PROGRAM | Admitting: STUDENT IN AN ORGANIZED HEALTH CARE EDUCATION/TRAINING PROGRAM

## 2024-11-15 ENCOUNTER — NURSE TRIAGE (OUTPATIENT)
Dept: NURSING | Facility: CLINIC | Age: 49
End: 2024-11-15
Payer: COMMERCIAL

## 2024-11-15 VITALS
BODY MASS INDEX: 32.9 KG/M2 | HEART RATE: 70 BPM | OXYGEN SATURATION: 98 % | SYSTOLIC BLOOD PRESSURE: 136 MMHG | RESPIRATION RATE: 18 BRPM | WEIGHT: 235 LBS | DIASTOLIC BLOOD PRESSURE: 93 MMHG | HEIGHT: 71 IN | TEMPERATURE: 98.1 F

## 2024-11-15 DIAGNOSIS — M79.622 PAIN OF LEFT UPPER ARM: ICD-10-CM

## 2024-11-15 PROCEDURE — 99283 EMERGENCY DEPT VISIT LOW MDM: CPT | Performed by: STUDENT IN AN ORGANIZED HEALTH CARE EDUCATION/TRAINING PROGRAM

## 2024-11-15 PROCEDURE — 99282 EMERGENCY DEPT VISIT SF MDM: CPT | Performed by: STUDENT IN AN ORGANIZED HEALTH CARE EDUCATION/TRAINING PROGRAM

## 2024-11-15 RX ORDER — MULTIVITAMIN WITH IRON
1 TABLET ORAL DAILY
COMMUNITY

## 2024-11-15 ASSESSMENT — COLUMBIA-SUICIDE SEVERITY RATING SCALE - C-SSRS
6. HAVE YOU EVER DONE ANYTHING, STARTED TO DO ANYTHING, OR PREPARED TO DO ANYTHING TO END YOUR LIFE?: NO
2. HAVE YOU ACTUALLY HAD ANY THOUGHTS OF KILLING YOURSELF IN THE PAST MONTH?: NO
1. IN THE PAST MONTH, HAVE YOU WISHED YOU WERE DEAD OR WISHED YOU COULD GO TO SLEEP AND NOT WAKE UP?: NO

## 2024-11-15 ASSESSMENT — ACTIVITIES OF DAILY LIVING (ADL): ADLS_ACUITY_SCORE: 0

## 2024-11-15 NOTE — TELEPHONE ENCOUNTER
Nurse Triage SBAR    Situation: Cellulitis follow up    Background: Patient calling. 10/28 he had a flu vaccine. Pt was seen on 2024 for an infection at the injection site and prescribed Augmentin for cellulitis. He has finished the ABX but within the last hour his arm started feeling sore.     Assessment: Redness/pink color to the Left arm - about 4 inches. Pain to the area where he had the cellulitis. Pain is mostly with touch - 1/10. Temporal temp 97.7. Possible slight swelling to the area.     Protocol Recommended Disposition: See Physician within 24 hours    Recommendation: According to the protocol, Patient should be seen within 24 hours. Advised Patient that the patient needs to be seen within 24 hours. Care advice given. Patient verbalizes understanding and agrees with plan of care. Reviewed concerning symptoms and when to call back.     Rebecca Benson RN Nursing Advisor 11/15/2024 6:05 PM     Reason for Disposition   [1] Finished taking antibiotic AND [2] cellulitis symptoms are WORSE (e.g., redness, pain, drainage, swelling)    Additional Information   Negative: Shock suspected (e.g., cold/pale/clammy skin, too weak to stand, low BP, rapid pulse)   Negative: Sounds like a life-threatening emergency to the triager   Negative:  surgical wound infection suspected (post-op)   Negative: Surgical wound infection suspected (post-op)   Negative: [1] Widespread rash AND [2] drug rash suspected (i.e., allergic reaction to antibiotic)   Negative: Animal bite wound infection suspected   Negative: SEVERE pain   Negative: [1] SEVERE pain with bending of finger (or toe) AND [2] cellulitis on hand (or foot)   Negative: [1] Widespread rash AND [2] bright red, sunburn-like   Negative: Fever > 103 F (39.4 C)   Negative: Black (necrotic), dark purple, or blisters develop in area of cellulitis   Negative: Patient sounds very sick or weak to the triager   Negative: [1] Taking antibiotic > 24 hours AND [2] fever  persists   Negative: [1] Taking antibiotic > 24 hours AND [2] red streak (or line) runs from area of infection   Negative: [1] Fever > 100.0 F (37.8 C) AND [2] new-onset   Negative: [1] Red streak runs from area of infection AND [2] new-onset   Negative: [1] Caller has URGENT question AND [2] triager unable to answer question   Negative: [1] Taking antibiotic > 24 hours AND [2] cellulitis symptoms are WORSE (e.g., spreading redness, pain, swelling)    Protocols used: Cellulitis on Antibiotic Follow-up Call-A-

## 2024-11-16 NOTE — ED TRIAGE NOTES
Received flu shot on October 28th ending in cellulites on the left bicep area was on antibiotics for 5 days and seemed to clear up until today symptoms soreness in deltoid, pink in color.     Triage Assessment (Adult)       Row Name 11/15/24 2053          Triage Assessment    Airway WDL WDL        Respiratory WDL    Respiratory WDL WDL        Skin Circulation/Temperature WDL    Skin Circulation/Temperature WDL WDL        Cardiac WDL    Cardiac WDL WDL        Peripheral/Neurovascular WDL    Peripheral Neurovascular WDL WDL        Cognitive/Neuro/Behavioral WDL    Cognitive/Neuro/Behavioral WDL WDL

## 2024-11-16 NOTE — DISCHARGE INSTRUCTIONS
Here in the emergency department your physical exam is reassuring  At this point I do not think is likely have an infection but if you do develop any new or worsening symptoms please return immediately to the emergency department  Otherwise please call your primary care doctor in the next 2 to 3 days for follow-up visit

## 2024-11-16 NOTE — ED PROVIDER NOTES
[facility] EMERGENCY DEPARTMENT ([facility])    11/15/24      ED PROVIDER NOTE    History     Chief Complaint   Patient presents with    Arm Pain     Possible cellulitis     The history is provided by the patient and medical records.   Arm Pain    Milan Carrillo is a 49 year old male with a history of type 2 diabetes, arthritis, HTN, and HLD who presents to the ED for evaluation of left arm pain.  Patient received flu shot to his left shoulder on 10/28/2024.  Shortly after, he developed pain, itchiness, and redness to the site.  He was evaluated for this at urgent care on 11/2/2024 and was started on Augmentin course due to concern for infection.  He finished this course about a week ago and symptoms seemed to clear up.  Today while at work he was touching his shoulder and noted some soreness as well as slight discoloration upon further investigation.  He now presents to the ED due to concern that he has another infection.      Past Medical History  Past Medical History:   Diagnosis Date    Arthritis     Hyperlipidemia     Hypertension     Kidney stone     Sleep apnea      Past Surgical History:   Procedure Laterality Date    ARTHROSCOPY SHOULDER ROTATOR CUFF REPAIR Right     ENT SURGERY      EYE SURGERY      HERNIA REPAIR      HERNIORRHAPHY UMBILICAL N/A 5/29/2020    Procedure: OPEN UMBILICAL HERNIA REPAIR WITH  MESH;  Surgeon: Curt Ramachandran MD;  Location:  OR    ORTHOPEDIC SURGERY      R shoulder    RECESSION RESECTION (REPAIR STRABISMUS)  10/1/2012    Procedure: RECESSION RESECTION (REPAIR STRABISMUS);  RIGHT STRABISMUS REPAIR;  Surgeon: Joanie Gurrola MD;  Location:  EC    RHINOPLASTY      STRABISMUS SURGERY       fenofibrate (TRICOR) 48 MG tablet  magnesium 250 MG tablet  metFORMIN (GLUCOPHAGE XR) 500 MG 24 hr tablet  MOUNJARO 12.5 MG/0.5ML SOAJ  vitamin D2 (ERGOCALCIFEROL) 49129 units (1250 mcg) capsule  Continuous Glucose  (FREESTYLE WAYLON 3 READER) LALO  Continuous Glucose  "Sensor (FREESTYLE WAYLON 3 PLUS SENSOR) MISC  Continuous Glucose Sensor (FREESTYLE WAYLON 3 SENSOR) MISC  CONTOUR NEXT TEST test strip  sildenafil (REVATIO) 20 MG tablet      No Known Allergies  Family History  Family History   Problem Relation Age of Onset    Glaucoma No family hx of     Macular Degeneration No family hx of     Pulmonary fibrosis Mother     No Known Problems Father     Hypertension Maternal Grandmother     Diabetes No family hx of     Cancer No family hx of      Social History   Social History     Tobacco Use    Smoking status: Never    Smokeless tobacco: Never   Substance Use Topics    Alcohol use: Not Currently     Comment: Alcoholic Drinks/day: 2/month    Drug use: Never      Past medical history, past surgical history, medications, allergies, family history, and social history were reviewed with the patient. No additional pertinent items.     A complete review of systems was performed with pertinent positives and negatives noted in the HPI, and all other systems negative.    Physical Exam   BP: (!) 136/93  Pulse: 70  Temp: 98.1  F (36.7  C)  Resp: 18  Height: 180.3 cm (5' 11\")  Weight: 106.6 kg (235 lb)  SpO2: 98 %  Physical Exam  Vitals and nursing note reviewed.   Constitutional:       General: He is not in acute distress.     Appearance: Normal appearance. He is not toxic-appearing.   HENT:      Head: Atraumatic.   Eyes:      General: No scleral icterus.     Conjunctiva/sclera: Conjunctivae normal.   Cardiovascular:      Rate and Rhythm: Normal rate.      Heart sounds: Normal heart sounds.   Pulmonary:      Effort: Pulmonary effort is normal. No respiratory distress.      Breath sounds: Normal breath sounds.   Abdominal:      Palpations: Abdomen is soft.      Tenderness: There is no abdominal tenderness.   Musculoskeletal:         General: No deformity.      Cervical back: Neck supple.      Comments: Left arm with normal skin exam, no obvious deformity, soft compartments, normal pulses, warm " and well-perfused, normal strength, normal sensation normal exam with no tenderness to palpation, normal range of motion   Skin:     General: Skin is warm.   Neurological:      Mental Status: He is alert.           ED Course, Procedures, & Data      Procedures                No results found for any visits on 11/15/24.  Medications - No data to display  Labs Ordered and Resulted from Time of ED Arrival to Time of ED Departure - No data to display  No orders to display          Critical care was not performed.     Medical Decision Making  The patient's presentation was of low complexity (an acute and uncomplicated illness or injury).    The patient's evaluation involved:  review of external note(s) from 2 sources (see separate area of note for details)  review of 2 test result(s) ordered prior to this encounter (see separate area of note for details)  strong consideration of a test (x-ray arm, labs) that was ultimately deferred    The patient's management necessitated moderate risk (consideration of x-ray, labs, coordination of care, disposition home).    Assessment & Plan    Patient arrived with concern for arm pain after vaccine and possible cellulitis after taking course of antibiotics  His physical exam here is normal, no signs of infection, no signs of joint infection, neurovascularly intact, normal exam  As patient has normal exam, but is concerned as when he presses it he feels slightly tender like as if he had recently got a flu shot, at this point although I considered labs and additional imaging, no indication given normal physical exam  Less likely infection, fracture, septic joint, more likely patient with functional pain  Will discharge patient with final diagnosis of arm pain, strict return precautions to come back to the emergency room for new or worsening symptoms, instructed to follow-up with his primary care doctor in the next 1 to 2 days.    I have reviewed the nursing notes. I have reviewed the  findings, diagnosis, plan and need for follow up with the patient.    New Prescriptions    No medications on file       Final diagnoses:   None   IEsteban, am serving as a trained medical scribe to document services personally performed by El Barbosa MD based on the provider's statements to me on November 15, 2024.  This document has been checked and approved by the attending provider.    IEl MD, was physically present and have reviewed and verified the accuracy of this note documented by Esteban Pace medical scribe.      El Barbosa MD  [facility] EMERGENCY DEPARTMENT  11/15/2024     El Barbosa MD  11/15/24 0058

## 2024-12-11 ENCOUNTER — TELEPHONE (OUTPATIENT)
Dept: ENDOCRINOLOGY | Facility: CLINIC | Age: 49
End: 2024-12-11
Payer: COMMERCIAL

## 2024-12-11 DIAGNOSIS — E11.9 TYPE 2 DIABETES MELLITUS WITHOUT COMPLICATION, WITHOUT LONG-TERM CURRENT USE OF INSULIN (H): ICD-10-CM

## 2024-12-11 RX ORDER — FENOFIBRATE 48 MG/1
48 TABLET, COATED ORAL DAILY
Qty: 90 TABLET | Refills: 0 | Status: SHIPPED | OUTPATIENT
Start: 2024-12-11

## 2024-12-11 RX ORDER — METFORMIN HYDROCHLORIDE 500 MG/1
1000 TABLET, EXTENDED RELEASE ORAL 2 TIMES DAILY WITH MEALS
Qty: 360 TABLET | Refills: 0 | Status: SHIPPED | OUTPATIENT
Start: 2024-12-11

## 2024-12-11 NOTE — TELEPHONE ENCOUNTER
LOV 10/22/24:  Continue metformin XR 1000 mg twice a day   Continue fenofibrate at current dose.  Recheck fasting labs in 4-5 months.    Requested Prescriptions   Pending Prescriptions Disp Refills    fenofibrate (TRICOR) 48 MG tablet [Pharmacy Med Name: FENOFIBRATE 48MG TABS] 90 tablet 0     Sig: TAKE ONE TABLET BY MOUTH ONCE DAILY       Antihyperlipidemic agents Passed - 12/11/2024 12:31 PM        Passed - LDL on file in the past 12 months        Passed - Medication is active on med list        Passed - Recent (12 mo) or future (90 days) visit within the authorizing provider's specialty     The patient must have completed an in-person or virtual visit within the past 12 months or has a future visit scheduled within the next 90 days with the authorizing provider s specialty.  Urgent care and e-visits do not qualify as an office visit for this protocol.          Passed - Patient is age 18 years or older          metFORMIN (GLUCOPHAGE XR) 500 MG 24 hr tablet [Pharmacy Med Name: METFORMIN HCL ER 500MG TB24] 360 tablet 0     Sig: TAKE TWO TABLETS BY MOUTH TWICE A DAY WITH MEALS       Biguanide Agents Passed - 12/11/2024 12:31 PM        Passed - Patient is age 10 or older        Passed - Patient has documented A1c within the specified period of time.     If HgbA1C is 8 or greater, it needs to be on file within the past 3 months.  If less than 8, must be on file within the past 6 months.     Recent Labs   Lab Test 07/09/24  0824   A1C 5.5             Passed - Patient does NOT have a diagnosis of CHF.        Passed - Medication is active on med list        Passed - Medication indicated for associated diagnosis     Medication is associated with one or more of the following diagnoses:     Gestational diabetes mellitus     Hyperinsulinar obesity     Hypersecretion of ovarian androgens    Non-alcoholic fatty liver    Polycystic ovarian syndrome               Pre-diabetes (DM 2 prevention)    Type 2 diabetes mellitus      Weight gain, antipsychotic therapy-induced    Impaired fasting glucose          Passed - Has GFR on file in past 12 months and most recent value is normal        Passed - Recent (6 mo) or future (90 days) visit within the authorizing provider's specialty     The patient must have completed an in-person or virtual visit within the past 6 months or has a future visit scheduled within the next 90 days with the authorizing provider s specialty.  Urgent care and e-visits do not quality as an office visit for this protocol.

## 2024-12-12 ENCOUNTER — TELEPHONE (OUTPATIENT)
Dept: ENDOCRINOLOGY | Facility: CLINIC | Age: 49
End: 2024-12-12
Payer: COMMERCIAL

## 2024-12-12 DIAGNOSIS — E11.9 TYPE 2 DIABETES MELLITUS WITHOUT COMPLICATION, WITHOUT LONG-TERM CURRENT USE OF INSULIN (H): ICD-10-CM

## 2024-12-12 NOTE — TELEPHONE ENCOUNTER
[facility] Call [facility]    Phone Message    May a detailed message be left on voicemail: yes     Reason for Call: Other: Pt returning call to clinic. Pt is aware he needs to schedule labs but is wondering if he should schedule them sooner or if later is fine. Pt states Dr. Babcock had mentioned wanting to see his what his A1C levels were at. Pt is also interested in getting his testosterone tested. Please advise.     Action Taken: Other: Endo    Travel Screening: Not Applicable     Date of Service:

## 2024-12-12 NOTE — TELEPHONE ENCOUNTER
Testosterone level/possible hypogonadism was not discussed in last clinic visit.  I would recommend to start with primary care provider for that.  If testosterone levels are low then he can be seen by endocrinology for that.

## 2024-12-13 RX ORDER — TIRZEPATIDE 12.5 MG/.5ML
12.5 INJECTION, SOLUTION SUBCUTANEOUS
Qty: 6 ML | Refills: 0 | Status: SHIPPED | OUTPATIENT
Start: 2024-12-13

## 2024-12-18 NOTE — TELEPHONE ENCOUNTER
I would like to have a clinic visit first, know about more history and reasons to get testosterone checked.  Are there any major symptoms?

## 2025-01-21 DIAGNOSIS — E11.9 TYPE 2 DIABETES MELLITUS WITHOUT COMPLICATION, WITHOUT LONG-TERM CURRENT USE OF INSULIN (H): ICD-10-CM

## 2025-01-28 RX ORDER — ACYCLOVIR 800 MG/1
TABLET ORAL
Qty: 6 EACH | Refills: 0 | Status: SHIPPED | OUTPATIENT
Start: 2025-01-28

## 2025-01-28 NOTE — TELEPHONE ENCOUNTER
Last Written Prescription:  Continuous Glucose Sensor (FREESTYLE WAYLON 3 PLUS SENSOR) MISC 6 each 0 9/19/2024     ----------------------  Last Visit Date: 7/24/24  Future Visit Date: 3/18/25  ----------------------    Refill decision: Medication refilled per  Medication Refill in Ambulatory Care  policy.    Basia Sy RN  [facility] Nursing/[facility] Triage & Med Refill Team       Request from pharmacy:  Requested Prescriptions   Pending Prescriptions Disp Refills    Continuous Glucose Sensor (FREESTYLE WAYLON 3 SENSOR) MISC [Pharmacy Med Name: FREESTYLE WAYLON 3 SENSOR  [facility]  3     Sig: APPLY 1 NEW SENSOR EVERY 14 DAYS       There is no refill protocol information for this order

## 2025-03-02 ENCOUNTER — HEALTH MAINTENANCE LETTER (OUTPATIENT)
Age: 50
End: 2025-03-02

## 2025-03-11 ENCOUNTER — LAB (OUTPATIENT)
Dept: LAB | Facility: CLINIC | Age: 50
End: 2025-03-11
Payer: COMMERCIAL

## 2025-03-11 DIAGNOSIS — E11.9 TYPE 2 DIABETES MELLITUS WITHOUT COMPLICATION, WITHOUT LONG-TERM CURRENT USE OF INSULIN (H): ICD-10-CM

## 2025-03-11 LAB
EST. AVERAGE GLUCOSE BLD GHB EST-MCNC: 97 MG/DL
HBA1C MFR BLD: 5 % (ref 0–5.6)

## 2025-03-11 PROCEDURE — 84443 ASSAY THYROID STIM HORMONE: CPT

## 2025-03-11 PROCEDURE — 82043 UR ALBUMIN QUANTITATIVE: CPT

## 2025-03-11 PROCEDURE — 36415 COLL VENOUS BLD VENIPUNCTURE: CPT

## 2025-03-11 PROCEDURE — 82565 ASSAY OF CREATININE: CPT

## 2025-03-11 PROCEDURE — 80061 LIPID PANEL: CPT

## 2025-03-11 PROCEDURE — 82306 VITAMIN D 25 HYDROXY: CPT

## 2025-03-11 PROCEDURE — 83036 HEMOGLOBIN GLYCOSYLATED A1C: CPT

## 2025-03-11 PROCEDURE — 82570 ASSAY OF URINE CREATININE: CPT

## 2025-03-11 RX ORDER — METFORMIN HYDROCHLORIDE 500 MG/1
1000 TABLET, EXTENDED RELEASE ORAL 2 TIMES DAILY WITH MEALS
Qty: 120 TABLET | Refills: 0 | Status: SHIPPED | OUTPATIENT
Start: 2025-03-11

## 2025-03-11 RX ORDER — FENOFIBRATE 48 MG/1
48 TABLET, COATED ORAL DAILY
Qty: 30 TABLET | Refills: 0 | Status: SHIPPED | OUTPATIENT
Start: 2025-03-11

## 2025-03-11 NOTE — TELEPHONE ENCOUNTER
Requested Prescriptions   Pending Prescriptions Disp Refills    metFORMIN (GLUCOPHAGE XR) 500 MG 24 hr tablet [Pharmacy Med Name: METFORMIN HCL ER 500MG TB24] 360 tablet 0     Sig: TAKE TWO TABLETS BY MOUTH TWICE A DAY WITH MEALS       Biguanide Agents Passed - 3/11/2025 12:27 PM        Passed - Patient is age 10 or older        Passed - Patient has documented A1c within the specified period of time.     If HgbA1C is 8 or greater, it needs to be on file within the past 3 months.  If less than 8, must be on file within the past 6 months.     Recent Labs   Lab Test 03/11/25  0758   A1C 5.0             Passed - Patient does NOT have a diagnosis of CHF.        Passed - Medication is active on med list and the sig matches. RN to manually verify dose and sig if red X/fail.     If the protocol passes (green check), you do not need to verify med dose and sig.    A prescription matches if they are the same clinical intention.    For Example: once daily and every morning are the same.    The protocol can not identify upper and lower case letters as matching and will fail.     For Example: Take 1 tablet (50 mg) by mouth daily     TAKE 1 TABLET (50 MG) BY MOUTH DAILY    For all fails (red x), verify dose and sig.    If the refill does match what is on file, the RN can still proceed to approve the refill request.       If they do not match, route to the appropriate provider.             Passed - Medication indicated for associated diagnosis     Medication is associated with one or more of the following diagnoses:     Gestational diabetes mellitus     Hyperinsulinar obesity     Hypersecretion of ovarian androgens    Non-alcoholic fatty liver    Polycystic ovarian syndrome               Pre-diabetes (DM 2 prevention)    Type 2 diabetes mellitus     Weight gain, antipsychotic therapy-induced    Impaired fasting glucose          Passed - Has GFR on file in past 12 months and most recent value is normal        Passed - Recent (6  mo) or future (90 days) visit within the authorizing provider's specialty     The patient must have completed an in-person or virtual visit within the past 6 months or has a future visit scheduled within the next 90 days with the authorizing provider s specialty.  Urgent care and e-visits do not quality as an office visit for this protocol.            fenofibrate (TRICOR) 48 MG tablet [Pharmacy Med Name: FENOFIBRATE 48MG TABS] 90 tablet 0     Sig: TAKE ONE TABLET BY MOUTH ONCE DAILY       Antihyperlipidemic agents Passed - 3/11/2025 12:27 PM        Passed - LDL on file in the past 12 months        Passed - Medication is active on med list and the sig matches. RN to manually verify dose and sig if red X/fail.     If the protocol passes (green check), you do not need to verify med dose and sig.    A prescription matches if they are the same clinical intention.    For Example: once daily and every morning are the same.    The protocol can not identify upper and lower case letters as matching and will fail.     For Example: Take 1 tablet (50 mg) by mouth daily     TAKE 1 TABLET (50 MG) BY MOUTH DAILY    For all fails (red x), verify dose and sig.    If the refill does match what is on file, the RN can still proceed to approve the refill request.       If they do not match, route to the appropriate provider.             Passed - Recent (12 mo) or future (90 days) visit within the authorizing provider's specialty     The patient must have completed an in-person or virtual visit within the past 12 months or has a future visit scheduled within the next 90 days with the authorizing provider s specialty.  Urgent care and e-visits do not qualify as an office visit for this protocol.          Passed - Patient is age 18 years or older

## 2025-03-11 NOTE — PROGRESS NOTES
Outcome for 03/11/25 7:50 AM: Data uploaded on [employer]  Tamera Cerrato MA  Outcome for 03/14/25 11:10 AM: Data obtained via [employer] website  Jayden Silva MA      Patient is showing 3/5 MNCM met. BP out range and Not on statin   Jayden Silva MA

## 2025-03-12 LAB
CHOLEST SERPL-MCNC: 136 MG/DL
CREAT SERPL-MCNC: 1.46 MG/DL (ref 0.67–1.17)
CREAT UR-MCNC: 343 MG/DL
EGFRCR SERPLBLD CKD-EPI 2021: 59 ML/MIN/1.73M2
FASTING STATUS PATIENT QL REPORTED: YES
HDLC SERPL-MCNC: 35 MG/DL
LDLC SERPL CALC-MCNC: 70 MG/DL
MICROALBUMIN UR-MCNC: 15.1 MG/L
MICROALBUMIN/CREAT UR: 4.4 MG/G CR (ref 0–17)
NONHDLC SERPL-MCNC: 101 MG/DL
TRIGL SERPL-MCNC: 156 MG/DL
TSH SERPL DL<=0.005 MIU/L-ACNC: 2.86 UIU/ML (ref 0.3–4.2)
VIT D+METAB SERPL-MCNC: 46 NG/ML (ref 20–50)

## 2025-03-18 ENCOUNTER — VIRTUAL VISIT (OUTPATIENT)
Dept: ENDOCRINOLOGY | Facility: CLINIC | Age: 50
End: 2025-03-18
Payer: COMMERCIAL

## 2025-03-18 VITALS — BODY MASS INDEX: 32.78 KG/M2 | HEIGHT: 71 IN

## 2025-03-18 DIAGNOSIS — E78.2 MIXED HYPERLIPIDEMIA: ICD-10-CM

## 2025-03-18 DIAGNOSIS — E11.9 TYPE 2 DIABETES MELLITUS WITHOUT COMPLICATION, WITHOUT LONG-TERM CURRENT USE OF INSULIN (H): Primary | ICD-10-CM

## 2025-03-18 PROCEDURE — 98006 SYNCH AUDIO-VIDEO EST MOD 30: CPT | Performed by: INTERNAL MEDICINE

## 2025-03-18 PROCEDURE — 95251 CONT GLUC MNTR ANALYSIS I&R: CPT | Mod: 95 | Performed by: INTERNAL MEDICINE

## 2025-03-18 PROCEDURE — G2211 COMPLEX E/M VISIT ADD ON: HCPCS | Performed by: INTERNAL MEDICINE

## 2025-03-18 RX ORDER — METFORMIN HYDROCHLORIDE 500 MG/1
1000 TABLET, EXTENDED RELEASE ORAL
Qty: 180 TABLET | Refills: 3 | Status: SHIPPED | OUTPATIENT
Start: 2025-03-18

## 2025-03-18 RX ORDER — TIRZEPATIDE 12.5 MG/.5ML
12.5 INJECTION, SOLUTION SUBCUTANEOUS
Qty: 6 ML | Refills: 1 | Status: SHIPPED | OUTPATIENT
Start: 2025-03-18

## 2025-03-18 NOTE — PATIENT INSTRUCTIONS
[facility]  Dr Babcock, Endocrinology Department    [facility]   [address] [address]  [place] [address]  Appointment [phone]  Fax: [phone]  [place]: Monday - Thursday      To provide the best diabetic care, please bring your blood glucose meter to each and every visit with your Endocrinologist.  Your blood glucose meter/insulin pump will be downloaded at every appointment.    Please arrive 15 minutes before your scheduled appointment.  This will allow for your blood glucose meter/insulin pump to be downloaded.  If you are wearing DEXCOM please bring  or sharing code so that it can be downloaded.  If you are using freestyle deya personal sensors please bring the reader.  If you are using TANDEM insulin pump please have your username and password to get info from [employer] website.      Decrease Metfromin XR to 1000 mg/day  Continue Mounjaro 12.5 mg/week  Continue to use deya 3 + and monitor BG closely.  Lab only in 3 months  Labs and follow up in 6-7 months  Please make a lab appointment for blood work and follow up clinic appointment in 1 week after that to discuss results.    Recommend checking blood sugars before meals and at bedtime.    If Blood glucose are low more often-> 2-3 times/week- give us a call.  Make better food choices: reduce carbs, Reduce portion size, weight loss and exercise 3-4 times a week.    What is hypoglycemia:  Hypoglycemia is when blood sugar levels become too low - below 70 m/dl.      What causes hypoglycemia?  - using too much insulin  -taking too many diabetes pills  -not eating enough, or skipping meals or snacks  -not eating enough carbohydrate with meals  -changing your exercise routine  -drinking alcohol in excess    It is also possible to have hypoglycemia even when you are carefully managing your blood sugar levels.    What does it feel like when blood sugars get too low?  You may feel:  -  anxious  -confused  -dizzy  -hungry  -light-headed  -nervous  -shaky  -sleepy  -sweaty    You may have  -blurred or cloudy vision  -heart palpitations (heart skips a beat or races)  -tingling or numbness around the mouth and tongue  -tremors    What to do if you have symptoms of hypoglycmemia:  If you think your blood sugar is too low, check it with a glucose meter.  If its below 70 mg/dl, consume one of the following:  Fruit juice (1/2 cup)  Glucose tablets (15 grams)  Hard candy (5 to 7 pieces)  Honey or sugar (2 teaspoons)  Milk (1/2 cup)  Soft drink (non-diet, 1/2 cup)    Wait 15 minutes and check your blood glucose again.  IF it is still below 70 mg/dl, have another food item listed above. Wait another 15 minutes and repeat the blood glucose test.  Have a small meal or snack that contains some carbohydrate after your blood glucose rises above 70 mg/dl.    If you are at risk of hypoglycemia, always carry with you glucose tablets or one of the foods listed above.      To prevent Hypoglycemia:  Avoid situations that may cause hypoglycemia  Before making any change to your diet or exercise routine, discuss them with your healthcare provider  Keep a record of your blood glucose levels.  Include the time of day, diabetes medications, when you had your last meal or snack, and what you were doing at the time (e.g. Watching TV, gardening, jogging, etc).    Talk to your healthcare provider if your blood glucose levels are often low        Patient guide on hypoglycemia    http://[email or web address].org/Resources/upload/patient-guide-diagnosis-[email or web address]-management-[email or web address]

## 2025-03-18 NOTE — LETTER
"3/18/2025      Milan Carrillo  [address] [facility] [address]      Dear Colleague,    Thank you for referring your patient, Milan Carrillo, to the [facility]. Please see a copy of my visit note below.    Outcome for 03/11/25 7:50 AM: Data uploaded on [employer]  Tamera Cerrato MA  Outcome for 03/14/25 11:10 AM: Data obtained via [employer] website  Jayden Silva MA      Patient is showing 3/5 MNCM met. BP out range and Not on statin   Jayden Silva MA                  THIS IS A VIDEO VISIT:    Phone call visit/virtual visit encounter:    Name of patient: Milan Carrillo    Date of encounter: 3/18/2025    Time of start of video visit: 8:32    Video started: 8:40    Video ended: 8:53    Provider location: working from home/ [facility]    Patient location: patients home.    Mode of transmission: C-Note video/ [employer]    Verbal consent: obtained before starting visit. Pt is agreeable.      The patient has been notified of following:      \"This VIDEO visit will be conducted via a call between you and your physician/provider. We have found that certain health care needs can be provided without the need for a physical exam.  This service lets us provide the care you need with a short phone conversation.  If a prescription is necessary we can send it directly to your pharmacy.  If lab work is needed we can place an order for that and you can then stop by our lab to have the test done at a later time.     With new updates with corona virus patient might be billed as clinic visit.     If during the course of the call the physician/provider feels a telephone visit is not appropriate, you will not be charged for this service.\"      Past medical history, social history, family history, allergy and medications were reviewed and updated as appropriate.  Reviewed pertinent labs, notes, imaging studies personally.      ENDOCRINOLOGY CLINIC NOTE:  Name: Milan Carrillo  Seen for f/u of type 2 " Diabetes.  HPI:  Milan Carrillo is a 49 year old male who presents for the evaluation/management of Diabetes.   has a past medical history of Arthritis, Hyperlipidemia, Hypertension, Kidney stone, and Sleep apnea.    Wt loss- 80 over last 2 years. (287-->209)  He was switched from Ozempic to Munjaro for wt loss benefit.  (He reports that when he was on Ozempic 2 mg/week he had weight gain)  Since starting Mounjaro he was able to lose some weight but is not back to the weight when he was taking Ozempic 1 mg/week.  He is tolerating Mounjaro okay.    He is using trista 3.  Wt Readings from Last 2 Encounters:   11/15/24 106.6 kg (235 lb)   08/29/24 113.4 kg (250 lb)       Earlier he has questions about testosterone-- last 2022 check in range. Based on that he does not need to be on replacement.  Feeling tired.  Labs showing normal TSH, vitamin D levels.  Discussed that fatigue can be multifactorial.  Also recommend to establish with primary care provider.    1. Type 2 DM:  Orginally diagnosed at the age of: 46.  Current Regimen:   Metformin XR 1000 mg AM and 1000 mg PM.  Munjaro 12.5 mg/week.     yes:     Diabetes Medication(s)       Biguanides       metFORMIN (GLUCOPHAGE XR) 500 MG 24 hr tablet TAKE TWO TABLETS BY MOUTH TWICE A DAY WITH MEALS       Incretin Mimetic Agents       MOUNJARO 12.5 MG/0.5ML SOAJ Inject 0.5 mLs (12.5 mg) subcutaneously every 7 days.            Not able to tolerate higher dose of regular metformin 2/2 to GI s/e. Tolerating XR well.    BS checks: Trista 3  Average Meter Download: see nursing note.  Exercise: Walking on treadmill   last A1c:   Symptoms of hypoglycemia (low blood sugar):  Gets symptoms of hypoglycemia.  Episodes of hypoglycemia: Rare    DM Complications:   Complications:   Diabetes Complications  Description / Detail    Diabetic Retinopathy  No   CAD / PAD  No   Neuropathy  + Dm neuropathy   Nephropathy / Microalbuminuria  No    Gastroparesis  No   Hypoglycemia [place]arness  No        2. Hypertension:    Not on medications.  3. Hyperlipidemia: Not on medications. Noted to have high TG.    PMH/PSH:  Past Medical History:   Diagnosis Date     Arthritis      Hyperlipidemia      Hypertension      Kidney stone      Sleep apnea      Past Surgical History:   Procedure Laterality Date     ARTHROSCOPY SHOULDER ROTATOR CUFF REPAIR Right      ENT SURGERY       EYE SURGERY       HERNIA REPAIR       HERNIORRHAPHY UMBILICAL N/A 5/29/2020    Procedure: OPEN UMBILICAL HERNIA REPAIR WITH  MESH;  Surgeon: Curt Ramachandran MD;  Location:  OR     ORTHOPEDIC SURGERY      R shoulder     RECESSION RESECTION (REPAIR STRABISMUS)  10/1/2012    Procedure: RECESSION RESECTION (REPAIR STRABISMUS);  RIGHT STRABISMUS REPAIR;  Surgeon: Joanie Gurrola MD;  Location:  EC     RHINOPLASTY       STRABISMUS SURGERY       Family Hx:  Family History   Problem Relation Age of Onset     Glaucoma No family hx of      Macular Degeneration No family hx of      Pulmonary fibrosis Mother      No Known Problems Father      Hypertension Maternal Grandmother      Diabetes No family hx of      Cancer No family hx of        Diabetes:    Social Hx:  Social History     Socioeconomic History     Marital status:      Spouse name: Not on file     Number of children: Not on file     Years of education: Not on file     Highest education level: Not on file   Occupational History     Not on file   Tobacco Use     Smoking status: Never     Smokeless tobacco: Never   Substance and Sexual Activity     Alcohol use: Not Currently     Comment: Alcoholic Drinks/day: 2/month     Drug use: Never     Sexual activity: Not Currently   Other Topics Concern     Parent/sibling w/ CABG, MI or angioplasty before 65F 55M? Not Asked   Social History Narrative     Not on file     Social Drivers of Health     Financial Resource Strain: Not on file   Food Insecurity: Not on file   Transportation Needs: Not on file   Physical Activity: Not on file  "  Stress: Not on file   Social Connections: Not on file   Interpersonal Safety: Not on file   Housing Stability: Not on file          MEDICATIONS:  has a current medication list which includes the following prescription(s): freestyle deya 3 reader, freestyle deya 3 plus sensor, freestyle deya 3 sensor, contour next test, fenofibrate, magnesium, metformin, mounjaro, sildenafil, and vitamin d2.    ROS     ROS: 10 point ROS neg other than the symptoms noted above in the HPI.    Physical Exam   VS: Ht 1.803 m (5' 11\")   BMI 32.78 kg/m    GENERAL: healthy, alert and no distress  EYES: Eyes grossly normal to inspection, conjunctivae and sclerae normal  ENT: no nose swelling, nasal discharge.  Thyroid: no apparent thyroid nodules  RESP: no audible wheeze, cough, or visible cyanosis.  No visible retractions or increased work of breathing.  Able to speak fully in complete sentences.  ABDO: not evaluated.  EXTREMITIES: no hand tremors.  NEURO: Cranial nerves grossly intact, mentation intact and speech normal  SKIN: No apparent skin lesions, rash or edema seen   PSYCH: mentation appears normal, affect normal/bright, judgement and insight intact, normal speech and appearance well-groomed    LABS:  A1c:  Lab Results   Component Value Date    A1C 5.0 03/11/2025    A1C 5.5 07/09/2024    A1C 5.4 02/26/2024    A1C 5.7 08/30/2023    A1C 5.7 03/21/2023    A1C 5.4 05/30/2017    A1C 5.7 02/24/2016    A1C 5.2 06/04/2010     Creatinine:  Creatinine   Date Value Ref Range Status   03/11/2025 1.46 (H) 0.67 - 1.17 mg/dL Final   04/15/2020 1.08 0.66 - 1.25 mg/dL Final     Urine Micro:  Lab Results   Component Value Date    UMALCR 4.40 03/11/2025         LFTs/Lipids:  Recent Labs   Lab Test 03/11/25  0758 07/09/24  0824 06/07/21  1001 05/30/17  1703   CHOL 136 165   < > 206.7*   HDL 35* 31*   < > 35.1*   LDL 70 76   < > 124   TRIG 156* 290*   < > 237.5*   CHOLHDLRATIO  --   --   --  5.9*    < > = values in this interval not displayed. "     TFTs:  TSH   Date Value Ref Range Status   03/11/2025 2.86 0.30 - 4.20 uIU/mL Final   06/09/2022 3.04 0.30 - 5.00 uIU/mL Final   09/18/2015 2.52 0.40 - 4.00 mU/L Final       All pertinent notes, labs, and images personally reviewed by me.     Glucometer/ insulin pump (if applicable)/ CGM data (if applicable) downloaded, Personally reviewed and interpreted.  All Blood sugar data reviewed personally and discussed with pt.      A/P  Mr.David JODY Carrillo is a 48 year old here for the evaluation/management of diabetes:    1. DM2 - Under Good control. A1c 5.0%.   He is using deya 3plus.  In general BG in rage.  Average blood sugar is 92 with TIR 99%.  Few episodes of low blood sugar noted but likely falsely low (it is happening when he is sleeping on the side of sensor)  Plan:  Discussed diagnosis, pathophysiology, management and treatment options of condition with pt.  Blood sugar appears to treat control and he has lost significant weight as noted above.  Reviewed his risk for hypoglycemia, recommend to decrease metformin as noted below.  Decrease Metfromin XR to 1000 mg/day  Continue Mounjaro 12.5 mg/week  Continue to use deya 3 + and monitor BG closely.  Lab only in 3 months  Labs and follow up in 6-7 months  Please make a lab appointment for blood work and follow up clinic appointment in 1 week after that to discuss results.    2. Hypertension - Under Good control.  Not on medication.    3. Hyperlipidemia - Under fair control.  LDL 70.  He was started on pravastatin but he never picked up the prescription.  For high triglyceride levels he is on fibrate 48 mg/day. Tg improved.  Previously discussed starting stating- pravastatin but he would like to hold off as LDL is in range and he would like to focus on medication that will target triglycerides.  Continue fenofibrate at current dose.  Consider rechecking in few months.    4. Prevention:  Opthalmology-recommend annual  ASA-not indicated secondary to  age  Smoking-no    Foot exam: 6/2022.    Most Recent Immunizations   Administered Date(s) Administered     COVID-19 MONOVALENT 12+ (Pfizer) 10/06/2021     Influenza (IIV3) PF 09/27/2012     Influenza (intradermal) 10/24/2012     Influenza Vaccine >6 months,quad, PF 09/13/2021     TDAP (Adacel,Boostrix) 02/07/2019     TDAP Vaccine (Adacel) 02/07/2019     TDAP Vaccine (Boostrix) 09/27/2012     Plan: Hemoglobin A1c, GLUCOSE MONITOR, 72 HOUR, PHYS         INTERP, metFORMIN (GLUCOPHAGE XR) 500 MG 24 hr         tablet, MOUNJARO 12.5 MG/0.5ML SOAJ         auto-injector pen         Recommend checking blood sugars before meals and at bedtime.    If Blood glucose are low more often-> 2-3 times/week- give us a call.  The patient is advised to Make better food choices: reduce carbs, Reduce portion size, weight loss and exercise 3-4 times a week.  Discussed hypoglycemia signs and symptoms as well as management in detail.    There is some variability among people, most will usually develop symptoms suggestive of hypoglycemia when blood glucose levels are lowered to the mid 60's. The first set of symptoms are called adrenergic. Patients may experience any of the following nervousness, sweating, intense hunger, trembling, weakness, palpitations, and difficulty speaking.   The acute management of hypoglycemia involves the rapid delivery of a source of easily absorbed sugar. Regular soda, juice, lifesavers, table sugar, are good options. 15 grams of glucose is the dose that is given, followed by an assessment of symptoms and a blood glucose check if possible. If after 10 minutes there is no improvement, another 10-15 grams should be given. This can be repeated up to three times. The equivalency of 10-15 grams of glucose (approximate servings) are: Four lifesavers, 4 teaspoons of sugar, or 1/2 can of regular soda or juice.     Munjaro (Tirzepatide)- It si Glucose-Dependent Insulinotropic Polypeptide (GIP)/Glucagon-Like Peptide (GLP-1)  Receptor Agonist.  Dose: Initial: 2.5 mg once weekly (subcutaneoulsy) for 4 weeks, then increase to 5 mg once weekly. Then can increase dose gradually by 2.5 mg/week every 4 weeks if needed to achieve glycemic goals (maximum weekly dose: 15 mg/week).    Possible s/e: Most common possible side effects include gastrointestinal symptoms like abdominal pain, constipation, decreased appetite, diarrhea, nausea and vomiting.  Other possible side effects include acute kidney injury, diabetic retinopathy (it can worsen pre-existing diabetic retinopathy), gallbladder disease like gallstones, cholecystitis, biliary colic, hypersensitivity reactions, palpitations (increased heart rate).  Cases of acute pancreatitis  (inflammation of pancreas) have been observed.  Tirzepatide causes dose-dependent and treatment-duration-dependent thyroid C-cell tumors at clinically relevant exposures in rats. It is unknown whether tirzepatide causes thyroid C-cell tumor in humans.    Discussed indications, risks and benefits of all medications prescribed, and answered questions to patient's satisfaction.  The longitudinal plan of care for the diagnosis(es)/condition(s) as documented were addressed during this visit. Due to the added complexity in care, I will continue to support Milan in the subsequent management and with ongoing continuity of care.  All questions were answered.  The patient indicates understanding of the above issues and agrees with the plan set forth.     Follow-up:  As noted in AVS    Lola Babcock M.D  Endocrinology  [facility]/[place]  CC: Sandor Sanford    Disclaimer: This note consists of symbols derived from keyboarding, dictation and/or voice recognition software. As a result, there may be errors in the script that have gone undetected. Please consider this when interpreting information found in this chart.    Addendum to above note and clinic visit:    Labs reviewed.    See result note/telephone  encounter.      Again, thank you for allowing me to participate in the care of your patient.        Sincerely,        Lola Babcock MD    Electronically signed

## 2025-03-18 NOTE — NURSING NOTE
Current patient location: [address] [place] [id number]    Is the patient currently in the state of [place]? YES    Visit mode: VIDEO    If the visit is dropped, the patient can be reconnected by:VIDEO VISIT: Text to cell phone:   Telephone Information:   Mobile [phone]       Will anyone else be joining the visit? NO  (If patient encounters technical issues they should call [phone])    Are changes needed to the allergy or medication list? No    Are refills needed on medications prescribed by this physician? Discuss with provider    Rooming Documentation:  Questionnaire(s) completed    Reason for visit: CISCO DUENASF

## 2025-03-18 NOTE — PROGRESS NOTES
"THIS IS A VIDEO VISIT:    Phone call visit/virtual visit encounter:    Name of patient: Milan Carrillo    Date of encounter: 3/18/2025    Time of start of video visit: 8:32    Video started: 8:40    Video ended: 8:53    Provider location: working from home/ [facility]    Patient location: patients home.    Mode of transmission: TESARO video/ Trigemina    Verbal consent: obtained before starting visit. Pt is agreeable.      The patient has been notified of following:      \"This VIDEO visit will be conducted via a call between you and your physician/provider. We have found that certain health care needs can be provided without the need for a physical exam.  This service lets us provide the care you need with a short phone conversation.  If a prescription is necessary we can send it directly to your pharmacy.  If lab work is needed we can place an order for that and you can then stop by our lab to have the test done at a later time.     With new updates with corona virus patient might be billed as clinic visit.     If during the course of the call the physician/provider feels a telephone visit is not appropriate, you will not be charged for this service.\"      Past medical history, social history, family history, allergy and medications were reviewed and updated as appropriate.  Reviewed pertinent labs, notes, imaging studies personally.      ENDOCRINOLOGY CLINIC NOTE:  Name: Milan Carrillo  Seen for f/u of type 2 Diabetes.  HPI:  Milan Carrillo is a 49 year old male who presents for the evaluation/management of Diabetes.   has a past medical history of Arthritis, Hyperlipidemia, Hypertension, Kidney stone, and Sleep apnea.    Wt loss- 80 over last 2 years. (287-->209)  He was switched from Ozempic to Munjaro for wt loss benefit.  (He reports that when he was on Ozempic 2 mg/week he had weight gain)  Since starting Mounjaro he was able to lose some weight but is not back to the weight when he was taking Ozempic 1 " mg/week.  He is tolerating Mounjaro okay.    He is using trista 3.  Wt Readings from Last 2 Encounters:   11/15/24 106.6 kg (235 lb)   08/29/24 113.4 kg (250 lb)       Earlier he has questions about testosterone-- last 2022 check in range. Based on that he does not need to be on replacement.  Feeling tired.  Labs showing normal TSH, vitamin D levels.  Discussed that fatigue can be multifactorial.  Also recommend to establish with primary care provider.    1. Type 2 DM:  Orginally diagnosed at the age of: 46.  Current Regimen:   Metformin XR 1000 mg AM and 1000 mg PM.  Munjaro 12.5 mg/week.     yes:     Diabetes Medication(s)       Biguanides       metFORMIN (GLUCOPHAGE XR) 500 MG 24 hr tablet TAKE TWO TABLETS BY MOUTH TWICE A DAY WITH MEALS       Incretin Mimetic Agents       MOUNJARO 12.5 MG/0.5ML SOAJ Inject 0.5 mLs (12.5 mg) subcutaneously every 7 days.            Not able to tolerate higher dose of regular metformin 2/2 to GI s/e. Tolerating XR well.    BS checks: Trista 3  Average Meter Download: see nursing note.  Exercise: Walking on treadmill   last A1c:   Symptoms of hypoglycemia (low blood sugar):  Gets symptoms of hypoglycemia.  Episodes of hypoglycemia: Rare    DM Complications:   Complications:   Diabetes Complications  Description / Detail    Diabetic Retinopathy  No   CAD / PAD  No   Neuropathy  + Dm neuropathy   Nephropathy / Microalbuminuria  No    Gastroparesis  No   Hypoglycemia [place]awarness  No       2. Hypertension:    Not on medications.  3. Hyperlipidemia: Not on medications. Noted to have high TG.    PMH/PSH:  Past Medical History:   Diagnosis Date    Arthritis     Hyperlipidemia     Hypertension     Kidney stone     Sleep apnea      Past Surgical History:   Procedure Laterality Date    ARTHROSCOPY SHOULDER ROTATOR CUFF REPAIR Right     ENT SURGERY      EYE SURGERY      HERNIA REPAIR      HERNIORRHAPHY UMBILICAL N/A 5/29/2020    Procedure: OPEN UMBILICAL HERNIA REPAIR WITH  MESH;  Surgeon: Duarte  "Curt Pereira MD;  Location:  OR    ORTHOPEDIC SURGERY      R shoulder    RECESSION RESECTION (REPAIR STRABISMUS)  10/1/2012    Procedure: RECESSION RESECTION (REPAIR STRABISMUS);  RIGHT STRABISMUS REPAIR;  Surgeon: Joanie Gurrola MD;  Location:  [facility]    RHINOPLASTY      STRABISMUS SURGERY       Family Hx:  Family History   Problem Relation Age of Onset    Glaucoma No family hx of     Macular Degeneration No family hx of     Pulmonary fibrosis Mother     No Known Problems Father     Hypertension Maternal Grandmother     Diabetes No family hx of     Cancer No family hx of        Diabetes:    Social Hx:  Social History     Socioeconomic History    Marital status:      Spouse name: Not on file    Number of children: Not on file    Years of education: Not on file    Highest education level: Not on file   Occupational History    Not on file   Tobacco Use    Smoking status: Never    Smokeless tobacco: Never   Substance and Sexual Activity    Alcohol use: Not Currently     Comment: Alcoholic Drinks/day: 2/month    Drug use: Never    Sexual activity: Not Currently   Other Topics Concern    Parent/sibling w/ CABG, MI or angioplasty before 65F 55M? Not Asked   Social History Narrative    Not on file     Social Drivers of Health     Financial Resource Strain: Not on file   Food Insecurity: Not on file   Transportation Needs: Not on file   Physical Activity: Not on file   Stress: Not on file   Social Connections: Not on file   Interpersonal Safety: Not on file   Housing Stability: Not on file          MEDICATIONS:  has a current medication list which includes the following prescription(s): freestyle deya 3 reader, freestyle deya 3 plus sensor, freestyle deya 3 sensor, contour next test, fenofibrate, magnesium, metformin, mounjaro, sildenafil, and vitamin d2.    ROS     ROS: 10 point ROS neg other than the symptoms noted above in the HPI.    Physical Exam   VS: Ht 1.803 m (5' 11\")   BMI 32.78 kg/m  "   GENERAL: healthy, alert and no distress  EYES: Eyes grossly normal to inspection, conjunctivae and sclerae normal  ENT: no nose swelling, nasal discharge.  Thyroid: no apparent thyroid nodules  RESP: no audible wheeze, cough, or visible cyanosis.  No visible retractions or increased work of breathing.  Able to speak fully in complete sentences.  ABDO: not evaluated.  EXTREMITIES: no hand tremors.  NEURO: Cranial nerves grossly intact, mentation intact and speech normal  SKIN: No apparent skin lesions, rash or edema seen   PSYCH: mentation appears normal, affect normal/bright, judgement and insight intact, normal speech and appearance well-groomed    LABS:  A1c:  Lab Results   Component Value Date    A1C 5.0 03/11/2025    A1C 5.5 07/09/2024    A1C 5.4 02/26/2024    A1C 5.7 08/30/2023    A1C 5.7 03/21/2023    A1C 5.4 05/30/2017    A1C 5.7 02/24/2016    A1C 5.2 06/04/2010     Creatinine:  Creatinine   Date Value Ref Range Status   03/11/2025 1.46 (H) 0.67 - 1.17 mg/dL Final   04/15/2020 1.08 0.66 - 1.25 mg/dL Final     Urine Micro:  Lab Results   Component Value Date    UMALCR 4.40 03/11/2025         LFTs/Lipids:  Recent Labs   Lab Test 03/11/25  0758 07/09/24  0824 06/07/21  1001 05/30/17  1703   CHOL 136 165   < > 206.7*   HDL 35* 31*   < > 35.1*   LDL 70 76   < > 124   TRIG 156* 290*   < > 237.5*   CHOLHDLRATIO  --   --   --  5.9*    < > = values in this interval not displayed.     TFTs:  TSH   Date Value Ref Range Status   03/11/2025 2.86 0.30 - 4.20 uIU/mL Final   06/09/2022 3.04 0.30 - 5.00 uIU/mL Final   09/18/2015 2.52 0.40 - 4.00 mU/L Final       All pertinent notes, labs, and images personally reviewed by me.     Glucometer/ insulin pump (if applicable)/ CGM data (if applicable) downloaded, Personally reviewed and interpreted.  All Blood sugar data reviewed personally and discussed with yenni RIBEIRO/P  Mr.David JODY Carrillo is a 48 year old here for the evaluation/management of diabetes:    1. DM2 - Under Good  control. A1c 5.0%.   He is using deya 3plus.  In general BG in rage.  Average blood sugar is 92 with TIR 99%.  Few episodes of low blood sugar noted but likely falsely low (it is happening when he is sleeping on the side of sensor)  Plan:  Discussed diagnosis, pathophysiology, management and treatment options of condition with pt.  Blood sugar appears to treat control and he has lost significant weight as noted above.  Reviewed his risk for hypoglycemia, recommend to decrease metformin as noted below.  Decrease Metfromin XR to 1000 mg/day  Continue Mounjaro 12.5 mg/week  Continue to use deya 3 + and monitor BG closely.  Lab only in 3 months  Labs and follow up in 6-7 months  Please make a lab appointment for blood work and follow up clinic appointment in 1 week after that to discuss results.    2. Hypertension - Under Good control.  Not on medication.    3. Hyperlipidemia - Under fair control.  LDL 70.  He was started on pravastatin but he never picked up the prescription.  For high triglyceride levels he is on fibrate 48 mg/day. Tg improved.  Previously discussed starting stating- pravastatin but he would like to hold off as LDL is in range and he would like to focus on medication that will target triglycerides.  Continue fenofibrate at current dose.  Consider rechecking in few months.    4. Prevention:  Opthalmology-recommend annual  ASA-not indicated secondary to age  Smoking-no    Foot exam: 6/2022.    Most Recent Immunizations   Administered Date(s) Administered    COVID-19 MONOVALENT 12+ (Pfizer) 10/06/2021    Influenza (IIV3) PF 09/27/2012    Influenza (intradermal) 10/24/2012    Influenza Vaccine >6 months,quad, PF 09/13/2021    TDAP (Adacel,Boostrix) 02/07/2019    TDAP Vaccine (Adacel) 02/07/2019    TDAP Vaccine (Boostrix) 09/27/2012     Plan: Hemoglobin A1c, GLUCOSE MONITOR, 72 HOUR, PHYS         INTERP, metFORMIN (GLUCOPHAGE XR) 500 MG 24 hr         tablet, MOUNJARO 12.5 MG/0.5ML SOAJ          auto-injector pen         Recommend checking blood sugars before meals and at bedtime.    If Blood glucose are low more often-> 2-3 times/week- give us a call.  The patient is advised to Make better food choices: reduce carbs, Reduce portion size, weight loss and exercise 3-4 times a week.  Discussed hypoglycemia signs and symptoms as well as management in detail.    There is some variability among people, most will usually develop symptoms suggestive of hypoglycemia when blood glucose levels are lowered to the mid 60's. The first set of symptoms are called adrenergic. Patients may experience any of the following nervousness, sweating, intense hunger, trembling, weakness, palpitations, and difficulty speaking.   The acute management of hypoglycemia involves the rapid delivery of a source of easily absorbed sugar. Regular soda, juice, lifesavers, table sugar, are good options. 15 grams of glucose is the dose that is given, followed by an assessment of symptoms and a blood glucose check if possible. If after 10 minutes there is no improvement, another 10-15 grams should be given. This can be repeated up to three times. The equivalency of 10-15 grams of glucose (approximate servings) are: Four lifesavers, 4 teaspoons of sugar, or 1/2 can of regular soda or juice.     Munjaro (Tirzepatide)- It si Glucose-Dependent Insulinotropic Polypeptide (GIP)/Glucagon-Like Peptide (GLP-1) Receptor Agonist.  Dose: Initial: 2.5 mg once weekly (subcutaneoulsy) for 4 weeks, then increase to 5 mg once weekly. Then can increase dose gradually by 2.5 mg/week every 4 weeks if needed to achieve glycemic goals (maximum weekly dose: 15 mg/week).    Possible s/e: Most common possible side effects include gastrointestinal symptoms like abdominal pain, constipation, decreased appetite, diarrhea, nausea and vomiting.  Other possible side effects include acute kidney injury, diabetic retinopathy (it can worsen pre-existing diabetic retinopathy),  gallbladder disease like gallstones, cholecystitis, biliary colic, hypersensitivity reactions, palpitations (increased heart rate).  Cases of acute pancreatitis  (inflammation of pancreas) have been observed.  Tirzepatide causes dose-dependent and treatment-duration-dependent thyroid C-cell tumors at clinically relevant exposures in rats. It is unknown whether tirzepatide causes thyroid C-cell tumor in humans.    Discussed indications, risks and benefits of all medications prescribed, and answered questions to patient's satisfaction.  The longitudinal plan of care for the diagnosis(es)/condition(s) as documented were addressed during this visit. Due to the added complexity in care, I will continue to support Milan in the subsequent management and with ongoing continuity of care.  All questions were answered.  The patient indicates understanding of the above issues and agrees with the plan set forth.     Follow-up:  As noted in AVS    Lola Babcock M.D  Endocrinology  [facility]/[place]  CC: Sandor Sanford    Disclaimer: This note consists of symbols derived from keyboarding, dictation and/or voice recognition software. As a result, there may be errors in the script that have gone undetected. Please consider this when interpreting information found in this chart.    Addendum to above note and clinic visit:    Labs reviewed.    See result note/telephone encounter.

## 2025-04-03 ENCOUNTER — OFFICE VISIT (OUTPATIENT)
Dept: FAMILY MEDICINE | Facility: CLINIC | Age: 50
End: 2025-04-03
Payer: COMMERCIAL

## 2025-04-03 VITALS
DIASTOLIC BLOOD PRESSURE: 90 MMHG | OXYGEN SATURATION: 97 % | HEIGHT: 71 IN | RESPIRATION RATE: 16 BRPM | BODY MASS INDEX: 29.57 KG/M2 | SYSTOLIC BLOOD PRESSURE: 124 MMHG | HEART RATE: 67 BPM | WEIGHT: 211.2 LBS

## 2025-04-03 DIAGNOSIS — N52.9 ERECTILE DYSFUNCTION, UNSPECIFIED ERECTILE DYSFUNCTION TYPE: ICD-10-CM

## 2025-04-03 DIAGNOSIS — Z00.00 ROUTINE GENERAL MEDICAL EXAMINATION AT A HEALTH CARE FACILITY: ICD-10-CM

## 2025-04-03 DIAGNOSIS — K76.0 FATTY LIVER: ICD-10-CM

## 2025-04-03 DIAGNOSIS — Z76.89 ENCOUNTER TO ESTABLISH CARE: Primary | ICD-10-CM

## 2025-04-03 DIAGNOSIS — Z12.11 SCREEN FOR COLON CANCER: ICD-10-CM

## 2025-04-03 DIAGNOSIS — E11.9 TYPE 2 DIABETES MELLITUS WITHOUT COMPLICATION, WITHOUT LONG-TERM CURRENT USE OF INSULIN (H): ICD-10-CM

## 2025-04-03 DIAGNOSIS — R53.82 CHRONIC FATIGUE: ICD-10-CM

## 2025-04-03 LAB
ALBUMIN SERPL BCG-MCNC: 4.7 G/DL (ref 3.5–5.2)
ALP SERPL-CCNC: 62 U/L (ref 40–150)
ALT SERPL W P-5'-P-CCNC: 33 U/L (ref 0–70)
ANION GAP SERPL CALCULATED.3IONS-SCNC: 15 MMOL/L (ref 7–15)
AST SERPL W P-5'-P-CCNC: 31 U/L (ref 0–45)
ATRIAL RATE - MUSE: 68 BPM
BILIRUB SERPL-MCNC: 0.5 MG/DL
BUN SERPL-MCNC: 15.5 MG/DL (ref 6–20)
CALCIUM SERPL-MCNC: 10.5 MG/DL (ref 8.8–10.4)
CHLORIDE SERPL-SCNC: 107 MMOL/L (ref 98–107)
CREAT SERPL-MCNC: 1.34 MG/DL (ref 0.67–1.17)
DIASTOLIC BLOOD PRESSURE - MUSE: NORMAL MMHG
EGFRCR SERPLBLD CKD-EPI 2021: 65 ML/MIN/1.73M2
GLUCOSE SERPL-MCNC: 82 MG/DL (ref 70–99)
HCO3 SERPL-SCNC: 21 MMOL/L (ref 22–29)
INTERPRETATION ECG - MUSE: NORMAL
P AXIS - MUSE: 30 DEGREES
POTASSIUM SERPL-SCNC: 4.4 MMOL/L (ref 3.4–5.3)
PR INTERVAL - MUSE: 158 MS
PROT SERPL-MCNC: 7.5 G/DL (ref 6.4–8.3)
QRS DURATION - MUSE: 86 MS
QT - MUSE: 390 MS
QTC - MUSE: 414 MS
R AXIS - MUSE: -12 DEGREES
SHBG SERPL-SCNC: 120 NMOL/L (ref 11–80)
SODIUM SERPL-SCNC: 143 MMOL/L (ref 135–145)
SYSTOLIC BLOOD PRESSURE - MUSE: NORMAL MMHG
T AXIS - MUSE: 38 DEGREES
VENTRICULAR RATE- MUSE: 68 BPM
VIT B12 SERPL-MCNC: 427 PG/ML (ref 232–1245)

## 2025-04-03 RX ORDER — TIRZEPATIDE 12.5 MG/.5ML
12.5 INJECTION, SOLUTION SUBCUTANEOUS
Qty: 6 ML | Refills: 1 | Status: SHIPPED | OUTPATIENT
Start: 2025-04-03

## 2025-04-03 RX ORDER — FENOFIBRATE 48 MG/1
48 TABLET, COATED ORAL DAILY
Qty: 30 TABLET | Refills: 0 | Status: SHIPPED | OUTPATIENT
Start: 2025-04-03

## 2025-04-03 RX ORDER — METFORMIN HYDROCHLORIDE 500 MG/1
1000 TABLET, EXTENDED RELEASE ORAL
Qty: 180 TABLET | Refills: 3 | Status: SHIPPED | OUTPATIENT
Start: 2025-04-03

## 2025-04-03 RX ORDER — HYDROCHLOROTHIAZIDE 12.5 MG/1
CAPSULE ORAL
Qty: 6 EACH | Refills: 0 | Status: SHIPPED | OUTPATIENT
Start: 2025-04-03

## 2025-04-03 RX ORDER — SILDENAFIL CITRATE 20 MG/1
TABLET ORAL
Qty: 90 TABLET | Refills: 3 | Status: SHIPPED | OUTPATIENT
Start: 2025-04-03

## 2025-04-03 SDOH — HEALTH STABILITY: PHYSICAL HEALTH: ON AVERAGE, HOW MANY MINUTES DO YOU ENGAGE IN EXERCISE AT THIS LEVEL?: 20 MIN

## 2025-04-03 SDOH — HEALTH STABILITY: PHYSICAL HEALTH: ON AVERAGE, HOW MANY DAYS PER WEEK DO YOU ENGAGE IN MODERATE TO STRENUOUS EXERCISE (LIKE A BRISK WALK)?: 2 DAYS

## 2025-04-03 ASSESSMENT — SOCIAL DETERMINANTS OF HEALTH (SDOH): HOW OFTEN DO YOU GET TOGETHER WITH FRIENDS OR RELATIVES?: THREE TIMES A WEEK

## 2025-04-03 NOTE — PATIENT INSTRUCTIONS
Labs today   Imaging orders: EKG in clinic, stress echo (they will call you), abdominal ultrasound     You can call the radiology department at [phone] to schedule your imaging test that was ordered for the abdominal ultrasound      Schedule colonoscopy (they will call you)

## 2025-04-03 NOTE — PROGRESS NOTES
Preventive Care Visit  [facility]  Laney Mckay MD, Family Medicine  Apr 3, 2025      Assessment & Plan     Encounter to establish care  Medical history reviewed best and chart updated accordingly    Routine general medical examination at a health care facility  routine labs were discussed and reviewed from recent endocrinology visit    Type 2 diabetes mellitus without complication, without long-term current use of insulin (H)  A1c 5.0% 3/2025.  He was diagnosed at age 46 with an A1c in the 9% range.  Continue current regimen which I will take over from endocrinology at patient request at this point to transition back to primary care.  Will reevaluate need for statin medication down the road.  For now his triglycerides are well-managed fibrate medication.  LDL is 70, triglyceride 156, down from 700 range in 2021.  - Adult Eye  Referral; Future  - Continuous Glucose Sensor (FREESTYLE WAYLON 3 PLUS SENSOR) [facility] [place]; Change every 15 days.  - fenofibrate (TRICOR) 48 MG tablet; Take 1 tablet (48 mg) by mouth daily.  - metFORMIN (GLUCOPHAGE XR) 500 MG 24 hr tablet; Take 2 tablets (1,000 mg) by mouth daily (with dinner).  - MOUNJARO 12.5 MG/0.5ML SOAJ auto-injector pen; Inject 0.5 mLs (12.5 mg) subcutaneously every 7 days.    Chronic fatigue  This was a large conversation today and more to be determined as it is likely most pleasant for the process.  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  He has had 2 sleep studies at different points in her life, around 2021 he was 240 pounds with mild MATILDE diagnosed in the CPAP on the lowest setting.  More recently he had a sleep test done at 287 pounds and there was no MATILDE diagnosed at that point.  Since that time he is actually lost 80 pounds and needs closer to 200 pounds now.      Sleeps 10 to 15 hours at night, no snoring.  Denies depression.   "His chronic medical conditions have been well-managed including the diabetes, fatty liver, triglyceridemia.    We will update some labs today.  Testosterone and other labs from 2022 looking at CRP, ESR, CBC, CMP were overall reassuring. Had low Vit D 2021 and replaced; 7.8--> 46 as of 3/2025.     I've advised considering cardiac evaluation next; EKG/stress echo and consideration for holter/zio patch.   - EKG 12-lead, tracing only: VR 68, normal intervals, normal sinus rhythm. Reviewed by myself.   - Echocardiogram Exercise Stress; Future  - Comprehensive metabolic panel (BMP + Alb, Alk Phos, ALT, AST, Total. Bili, TP)  - Anti Nuclear Mary IgG by IFA with Reflex  - Testosterone Free and Total  - Tissue transglutaminase mary IgA and IgG  - IgA  - Vitamin B12    Fatty liver  - Comprehensive metabolic panel (BMP + Alb, Alk Phos, ALT, AST, Total. Bili, TP); Future  - US Abdomen Limited; Future  - Comprehensive metabolic panel (BMP + Alb, Alk Phos, ALT, AST, Total. Bili, TP)    Erectile dysfunction, unspecified erectile dysfunction type  - sildenafil (REVATIO) 20 MG tablet; Take 3-4 tabs as directed before sexual activity    Screen for colon cancer  - Colonoscopy Screening  Referral; Future    Patient has been advised of split billing requirements and indicates understanding: Yes    45 of the 56 minutes on the date of the encounter was spent outside of preventative health care with regard todoing chart review, patient visit and documentation of other pertinent medical conditions or history.      BMI  Estimated body mass index is 29.46 kg/m  as calculated from the following:    Height as of this encounter: 1.803 m (5' 11\").    Weight as of this encounter: 95.8 kg (211 lb 3.2 oz).   Weight management plan: Discussed healthy diet and exercise guidelines    Counseling  Appropriate preventive services were addressed with this patient via screening, questionnaire, or discussion as appropriate for fall prevention, " nutrition, physical activity, Tobacco-use cessation, social engagement, weight loss and cognition.  Checklist reviewing preventive services available has been given to the patient.  Reviewed patient's diet, addressing concerns and/or questions.   He is at risk for lack of exercise and has been provided with information to increase physical activity for the benefit of his well-being.       Follow-up 2-3 weeks to review labs/update      Subjective   Milan is a 50 year old, presenting for the following:  Physical (Fasting- Follow up on plan w/ Dr Sanford)        4/3/2025     7:48 AM   Additional Questions   Roomed by Kelin MAC MA          HPI       He was seen by his prior PCP last in 2022 (then transitioned to endocrinology care)   He was referred by his wife and made this appt in Sept/Oct.     DM2: Dx age 46. He was seen at an outside clinic at one point and never told he had prediabetes.   A1c now 5% range  Metformin XR 1000 mg AM and 1000 mg PM.  Mounjaro 12.5 mg/week.   On fenofibrate for TG in 700s range; he didn't end up filling the pravastatin that was offered at the time.   Wt loss- 80lbs over last 2 years. (287-->209)  He was switched from Ozempic to Munjaro for wt loss benefit.    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 He rarely wakes up energized  Was 270lbs when he had a sleep study which was normal    He doesn't drink alcohol    He works in mental health  Feels his mood is pretty good  Has join in his life with his children    When he exercises 10-15in he feels very depleted later that day so he only does 2-3 times a week  He feels very sore after lifting  "weights; 4 days of soreness for example. 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      4/3/2025   General Health   How would you rate your overall physical health? Good   Feel stress (tense, anxious, or unable to sleep) Not at all         4/3/2025   Nutrition   Three or more servings of calcium each day? Yes   Diet: Diabetic   How many servings of fruit and vegetables per day? (!) 2-3   How many sweetened beverages each day? 0-1         4/3/2025   Exercise   Days per week of moderate/strenous exercise 2 days   Average minutes spent exercising at this level 20 min   (!) EXERCISE CONCERN      4/3/2025   Social Factors   Frequency of gathering with friends or relatives Three times a week   Worry food won't last until get money to buy more No   Food not last or not have enough money for food? No   Do you have housing? (Housing is defined as stable permanent housing and does not include staying ouside in a car, in a tent, in an abandoned building, in an overnight shelter, or couch-surfing.) Yes   Are you worried about losing your housing? No   Lack of transportation? No   Unable to get utilities (heat,electricity)? No         4/3/2025   Dental   Dentist two times every year? Yes           Today's PHQ-2 Score:       4/3/2025     6:32 AM   PHQ-2 ( 1999 Pfizer)   Q1: Little interest or " "pleasure in doing things 2   Q2: Feeling down, depressed or hopeless 0   PHQ-2 Score 2    Q1: Little interest or pleasure in doing things More than half the days   Q2: Feeling down, depressed or hopeless Not at all   PHQ-2 Score 2       Patient-reported           4/3/2025   Substance Use   Alcohol more than 3/day or more than 7/wk No   Do you use any other substances recreationally? No     Social History     Tobacco Use    Smoking status: Never    Smokeless tobacco: Never   Substance Use Topics    Alcohol use: Not Currently     Comment: Alcoholic Drinks/day: 2/month    Drug use: Never           4/3/2025   STI Screening   New sexual partner(s) since last STI/HIV test? (!) YES    ASCVD Risk   The 10-year ASCVD risk score (Opal ALONZO, et al., 2019) is: 4.2%    Values used to calculate the score:      Age: 50 years      Sex: Male      Is Non- : No      Diabetic: Yes      Tobacco smoker: No      Systolic Blood Pressure: 124 mmHg      Is BP treated: No      HDL Cholesterol: 35 mg/dL      Total Cholesterol: 136 mg/dL        4/3/2025   Contraception/Family Planning   Questions about contraception or family planning No        Reviewed and updated as needed this visit by Provider   Tobacco  Allergies  Meds  Problems  Med Hx  Surg Hx  Fam Hx                     Objective    Exam  /90 (BP Location: Right arm, Patient Position: Sitting, Cuff Size: Adult Regular)   Pulse 67   Resp 16   Ht 1.803 m (5' 11\")   Wt 95.8 kg (211 lb 3.2 oz)   SpO2 97%   BMI 29.46 kg/m     Estimated body mass index is 29.46 kg/m  as calculated from the following:    Height as of this encounter: 1.803 m (5' 11\").    Weight as of this encounter: 95.8 kg (211 lb 3.2 oz).    Physical Exam  GENERAL: alert and no distress  EYES: Eyes grossly normal to inspection, PERRL and conjunctivae and sclerae normal  HENT: ear canals and TM's normal, nose and mouth without ulcers or lesions  NECK: no adenopathy, no " asymmetry, masses, or scars  RESP: lungs clear to auscultation - no rales, rhonchi or wheezes  CV: regular rate and rhythm, normal S1 S2, no S3 or S4, no murmur, click or rub, no peripheral edema  ABDOMEN: soft, nontender, no hepatosplenomegaly, no masses and bowel sounds normal  MS: no gross musculoskeletal defects noted, no edema  SKIN: no suspicious lesions or rashes  NEURO: Normal strength and tone, mentation intact and speech normal  PSYCH: mentation appears normal, affect normal/bright        Signed Electronically by: Laney Mckay MD

## 2025-04-05 LAB
TTG IGA SER-ACNC: 0.6 U/ML
TTG IGG SER-ACNC: <0.6 U/ML

## 2025-04-08 LAB
TESTOST FREE SERPL-MCNC: 7.21 NG/DL
TESTOST SERPL-MCNC: 861 NG/DL (ref 240–950)

## 2025-04-14 ENCOUNTER — HOSPITAL ENCOUNTER (OUTPATIENT)
Dept: ULTRASOUND IMAGING | Facility: CLINIC | Age: 50
Discharge: HOME | End: 2025-04-14
Attending: FAMILY MEDICINE
Payer: COMMERCIAL

## 2025-04-14 ENCOUNTER — HOSPITAL ENCOUNTER (OUTPATIENT)
Dept: CARDIOLOGY | Facility: CLINIC | Age: 50
Discharge: HOME | End: 2025-04-14
Attending: FAMILY MEDICINE
Payer: COMMERCIAL

## 2025-04-14 DIAGNOSIS — K76.0 FATTY LIVER: ICD-10-CM

## 2025-04-14 DIAGNOSIS — R53.82 CHRONIC FATIGUE: ICD-10-CM

## 2025-04-14 LAB
CV STRESS CURRENT BP HE: NORMAL
CV STRESS CURRENT HR HE: 100
CV STRESS CURRENT HR HE: 102
CV STRESS CURRENT HR HE: 102
CV STRESS CURRENT HR HE: 106
CV STRESS CURRENT HR HE: 113
CV STRESS CURRENT HR HE: 114
CV STRESS CURRENT HR HE: 114
CV STRESS CURRENT HR HE: 118
CV STRESS CURRENT HR HE: 121
CV STRESS CURRENT HR HE: 123
CV STRESS CURRENT HR HE: 123
CV STRESS CURRENT HR HE: 124
CV STRESS CURRENT HR HE: 139
CV STRESS CURRENT HR HE: 144
CV STRESS CURRENT HR HE: 147
CV STRESS CURRENT HR HE: 147
CV STRESS CURRENT HR HE: 156
CV STRESS CURRENT HR HE: 163
CV STRESS CURRENT HR HE: 78
CV STRESS CURRENT HR HE: 85
CV STRESS CURRENT HR HE: 87
CV STRESS CURRENT HR HE: 89
CV STRESS CURRENT HR HE: 96
CV STRESS CURRENT HR HE: 99
CV STRESS DEVIATION TIME HE: NORMAL
CV STRESS ECHO PERCENT HR HE: NORMAL
CV STRESS EXERCISE STAGE HE: NORMAL
CV STRESS EXERCISE STAGE REACHED HE: NORMAL
CV STRESS FINAL RESTING BP HE: NORMAL
CV STRESS FINAL RESTING HR HE: 100
CV STRESS MAX HR HE: 163
CV STRESS MAX TREADMILL GRADE HE: 16
CV STRESS MAX TREADMILL SPEED HE: 4.2
CV STRESS PEAK DIA BP HE: NORMAL
CV STRESS PEAK SYS BP HE: NORMAL
CV STRESS PHASE HE: NORMAL
CV STRESS PROTOCOL HE: NORMAL
CV STRESS REASON STOPPED HE: NORMAL
CV STRESS RESTING PT POSITION HE: NORMAL
CV STRESS RESTING PT POSITION HE: NORMAL
CV STRESS ST DEVIATION AMOUNT HE: NORMAL
CV STRESS ST DEVIATION ELEVATION HE: NORMAL
CV STRESS ST EVELATION AMOUNT HE: NORMAL
CV STRESS SYMPTOMS HE: NORMAL
CV STRESS TEST TYPE HE: NORMAL
CV STRESS TOTAL STAGE TIME MIN 1 HE: NORMAL
STRESS ECHO BASELINE DIASTOLIC HE: 89
STRESS ECHO BASELINE HR: 89
STRESS ECHO BASELINE SYSTOLIC BP: 129
STRESS ECHO LAST STRESS DIASTOLIC BP: 88
STRESS ECHO LAST STRESS HR: 163
STRESS ECHO LAST STRESS SYSTOLIC BP: 160
STRESS ECHO POST ESTIMATED WORKLOAD: 10.9
STRESS ECHO POST EXERCISE DUR MIN: 9
STRESS ECHO POST EXERCISE DUR SEC: 30
STRESS ECHO TARGET HR: 146

## 2025-04-14 PROCEDURE — 93352 ADMIN ECG CONTRAST AGENT: CPT | Performed by: INTERNAL MEDICINE

## 2025-04-14 PROCEDURE — 255N000002 HC RX 255 OP 636: Performed by: FAMILY MEDICINE

## 2025-04-14 PROCEDURE — 93321 DOPPLER ECHO F-UP/LMTD STD: CPT | Mod: 26 | Performed by: INTERNAL MEDICINE

## 2025-04-14 PROCEDURE — 76705 ECHO EXAM OF ABDOMEN: CPT

## 2025-04-14 PROCEDURE — C8928 TTE W OR W/O FOL W/CON,STRES: HCPCS

## 2025-04-14 PROCEDURE — 93325 DOPPLER ECHO COLOR FLOW MAPG: CPT | Mod: 26 | Performed by: INTERNAL MEDICINE

## 2025-04-14 PROCEDURE — 93350 STRESS TTE ONLY: CPT | Mod: 26 | Performed by: INTERNAL MEDICINE

## 2025-04-14 PROCEDURE — 93018 CV STRESS TEST I&R ONLY: CPT | Performed by: INTERNAL MEDICINE

## 2025-04-14 PROCEDURE — 93325 DOPPLER ECHO COLOR FLOW MAPG: CPT | Mod: TC

## 2025-04-14 PROCEDURE — 93016 CV STRESS TEST SUPVJ ONLY: CPT | Performed by: INTERNAL MEDICINE

## 2025-04-14 RX ADMIN — PERFLUTREN 5 ML: 6.52 INJECTION, SUSPENSION INTRAVENOUS at 13:15

## 2025-04-22 ENCOUNTER — VIRTUAL VISIT (OUTPATIENT)
Dept: FAMILY MEDICINE | Facility: CLINIC | Age: 50
End: 2025-04-22
Payer: COMMERCIAL

## 2025-04-22 DIAGNOSIS — E66.01 MORBID OBESITY (H): ICD-10-CM

## 2025-04-22 DIAGNOSIS — E11.9 TYPE 2 DIABETES MELLITUS WITHOUT COMPLICATION, WITHOUT LONG-TERM CURRENT USE OF INSULIN (H): ICD-10-CM

## 2025-04-22 DIAGNOSIS — K76.0 METABOLIC DYSFUNCTION-ASSOCIATED STEATOTIC LIVER DISEASE (MASLD): ICD-10-CM

## 2025-04-22 DIAGNOSIS — R53.82 CHRONIC FATIGUE: Primary | ICD-10-CM

## 2025-04-22 DIAGNOSIS — K76.0 FATTY LIVER: ICD-10-CM

## 2025-04-22 PROCEDURE — 98007 SYNCH AUDIO-VIDEO EST HI 40: CPT | Performed by: FAMILY MEDICINE

## 2025-04-22 NOTE — PROGRESS NOTES
Milan is a 50 year old who is being evaluated via a billable video visit.    How would you like to obtain your AVS? MyChart  If the video visit is dropped, the invitation should be resent by: Text to cell phone: [phone]  Will anyone else be joining your video visit? No      Assessment & Plan     Chronic fatigue  This was a large conversation at our first 4/3/2025 visit. 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  He has had 2 sleep studies at different points in his life; around 2021 he was 240 pounds with mild MATILDE diagnosed in the CPAP on the lowest setting.  More recently he had a sleep test done at 287 pounds and there was no MATILDE diagnosed at that point.  Since that time he is actually lost 80 pounds and now closer to 200 pounds now.  (Lost 40lbs since Nov 2024 with GLP1 specifically)     Sleeps 10 to 15 hours at night, no snoring.  Denies depression.  His chronic medical conditions have been well-managed including the diabetes, fatty liver, triglyceridemia.     Testosterone labs reassuring (total 860s, free T in the 7 range; elevated SHBG at 120-- reviewed his multiple co morbidities playing a role there with obesity, hx thyroid/insulin resistance, fatty liver etc.)     Celiac testing negative.     Testosterone and other labs from 2022 looking at CRP, ESR, CBC, CMP were overall reassuring. Had low Vit D 2021 and replaced; 7.8--> 46 as of 3/2025.      I've advised cardiac evaluation next; EKG/stress echo was normal 4/2025 and consideration for holter/zio patch- he defers this and will start wearing his smart watch more.   - encouraged to consider chronic fatigue eval with his endocrinologist further if needing adrenal work up     - sent note to his endocrinologist whom he will be seeing again in August; consideration if indicated for adrenal labs     Type 2 diabetes mellitus without complication, without long-term  current use of insulin (H)  A1c 5.0% 3/2025.  He was diagnosed at age 46 with an A1c in the 9% range.  Continue current regimen which I can take over from endocrinology at patient request at this point to transition back to primary care.  Will reevaluate need for statin medication down the road.  For now his triglycerides are well-managed fibrate medication.  LDL is 70, triglyceride 156, down from 700 range in 2021.  - Eye exam encouraged  - Continue metformin, Mounjaro 12.5mg weekly and fenofibrate       Fatty liver---> reviewed dx c/w MASLD at this time.   Metabolic dysfunction-associated steatotic liver disease (MASLD)  LFTs normal on recent April testing  - US liver done 4/2025 showing diffuse fatty liver (not different from 2005 that we can tell)      Follow-up 3 months    45 minutes spent on the date of the encounter doing chart review, patient visit and documentation.    The longitudinal plan of care for the diagnosis(es)/condition(s) as documented were addressed during this visit. Due to the added complexity in care, I will continue to support Milan in the subsequent management and with ongoing continuity of care.        Subjective   Milan is a 50 year old, presenting for the following health issues:  No chief complaint on file.        4/3/2025     7:48 AM   Additional Questions   Roomed by Kelin MAC MA     History of Present Illness       Reason for visit:  Follow up on labs and fatigue issues    He eats 2-3 servings of fruits and vegetables daily.He consumes 1 sweetened beverage(s) daily.He exercises with enough effort to increase his heart rate 10 to 19 minutes per day.  He exercises with enough effort to increase his heart rate 3 or less days per week.   He is taking medications regularly.        Here to follow up our 4/3/2025 visit in which we reviewed his chronic fatigue.     Has had the liver US showing fatty liver and also the stress echo which was normal.   Testosterone level was high at  861  Elevated Sex binding globulin at 120 (we reviewed normal TSH now but previously had issue with that; insulin resistance is normalized, has fatty liver disease, and fluctuating weights- down about 40lbs with GLP)    Free testosterone normal range at 7      He reviewed his Sleep number is in the 80s recently in the past 2 months (previously in the 60s for years)    Despite better sleep numbers he feels the fatigue is worse                   Objective           Vitals:  No vitals were obtained today due to virtual visit.    Physical Exam   GENERAL: alert and no distress  EYES: Eyes grossly normal to inspection.  No discharge or erythema, or obvious scleral/conjunctival abnormalities.  RESP: No audible wheeze, cough, or visible cyanosis.    SKIN: Visible skin clear. No significant rash, abnormal pigmentation or lesions.  NEURO: Cranial nerves grossly intact.  Mentation and speech appropriate for age.  PSYCH: Appropriate affect, tone, and pace of words      Narrative & Impression   EXAM: US ABDOMEN LIMITED  LOCATION: [facility]  DATE: 4/14/2025     INDICATION:  Fatty liver  COMPARISON: None.  TECHNIQUE: Limited abdominal ultrasound.     FINDINGS:     GALLBLADDER:  No gallstones, borderline gallbladder wall thickening, favored to be exaggerated due to under distention. No pericholecystic fluid. Negative sonographic Gallagher's sign.     BILE DUCTS: No biliary dilatation. The common duct measures 4 mm.     LIVER: Increased echogenicity from diffuse fatty infiltration. No focal mass. The portal vein is patent with flow in the normal direction.     RIGHT KIDNEY: No hydronephrosis.     PANCREAS: The visualized portions are normal.     No ascites.                                                                      IMPRESSION:  1.  Hepatic steatosis.  2.  Borderline gallbladder wall thickening measuring 4 mm, favored to be due to under distention, no other sonographic findings of acute  cholecystitis.          STRESS ECHO 4/14/2025  ______________________________________________________________________________   Interpretation Summary   A treadmill exercise test according to the Luis protocol was performed.   A moderate workload was achieved.   There were no ST segment changes observed with stress.   Normal resting wall motion and no stress-induced wall motion abnormality.   This was a normal stress echocardiogram with no evidence of stress-induced   ischemia.   ______________________________________________________________________________   Stress   The patient did not exhibit any symptoms during exercise.   Exercise was stopped due to fatigue.   RPP [id number].   A treadmill exercise test according to the Luis protocol was performed.   A moderate workload was achieved.   There were no ST segment changes observed with stress.   No arrhythmia noted.   Normal resting wall motion and no stress-induced wall motion abnormality.         Video-Visit Details    Type of service:  Video Visit   Originating Location (pt. Location): Home    Distant Location (provider location):  On-site  Platform used for Video Visit: Gibran  Signed Electronically by: Laney Mckay MD

## 2025-04-23 ENCOUNTER — PATIENT OUTREACH (OUTPATIENT)
Dept: CARE COORDINATION | Facility: CLINIC | Age: 50
End: 2025-04-23
Payer: COMMERCIAL

## 2025-05-06 ENCOUNTER — DOCUMENTATION ONLY (OUTPATIENT)
Dept: FAMILY MEDICINE | Facility: CLINIC | Age: 50
End: 2025-05-06
Payer: COMMERCIAL

## 2025-05-06 NOTE — PROGRESS NOTES
Patient Quality Outreach    Patient is due for the following:   Diabetes -  Diabetic Follow-Up Visit    Action(s) Taken:   Schedule a office visit for Diabetic check    Type of outreach:    Sent [employer] message.    Questions for provider review:    None         Kelin Luo MA  Chart routed to None.

## 2025-05-08 DIAGNOSIS — E11.9 TYPE 2 DIABETES MELLITUS WITHOUT COMPLICATION, WITHOUT LONG-TERM CURRENT USE OF INSULIN (H): ICD-10-CM

## 2025-05-08 RX ORDER — FENOFIBRATE 48 MG/1
48 TABLET, FILM COATED ORAL DAILY
Qty: 30 TABLET | Refills: 2 | Status: SHIPPED | OUTPATIENT
Start: 2025-05-08

## 2025-06-21 ENCOUNTER — HEALTH MAINTENANCE LETTER (OUTPATIENT)
Age: 50
End: 2025-06-21

## 2025-07-09 DIAGNOSIS — E11.9 TYPE 2 DIABETES MELLITUS WITHOUT COMPLICATION, WITHOUT LONG-TERM CURRENT USE OF INSULIN (H): ICD-10-CM

## 2025-07-09 RX ORDER — HYDROCHLOROTHIAZIDE 12.5 MG/1
CAPSULE ORAL
Qty: 6 EACH | Refills: 2 | Status: SHIPPED | OUTPATIENT
Start: 2025-07-09

## 2025-08-11 ENCOUNTER — LAB (OUTPATIENT)
Dept: LAB | Facility: CLINIC | Age: 50
End: 2025-08-11
Payer: COMMERCIAL

## 2025-08-11 DIAGNOSIS — R53.82 CHRONIC FATIGUE: ICD-10-CM

## 2025-08-11 DIAGNOSIS — E11.9 TYPE 2 DIABETES MELLITUS WITHOUT COMPLICATION, WITHOUT LONG-TERM CURRENT USE OF INSULIN (H): ICD-10-CM

## 2025-08-11 LAB
CORTIS SERPL-MCNC: 7.3 UG/DL
EST. AVERAGE GLUCOSE BLD GHB EST-MCNC: 100 MG/DL
HBA1C MFR BLD: 5.1 % (ref 0–5.6)

## 2025-08-11 PROCEDURE — 82533 TOTAL CORTISOL: CPT

## 2025-08-11 PROCEDURE — 83036 HEMOGLOBIN GLYCOSYLATED A1C: CPT

## 2025-08-11 PROCEDURE — 82024 ASSAY OF ACTH: CPT

## 2025-08-11 PROCEDURE — 36415 COLL VENOUS BLD VENIPUNCTURE: CPT

## 2025-08-12 LAB — ACTH PLAS-MCNC: 12 PG/ML

## 2025-08-13 ENCOUNTER — TELEPHONE (OUTPATIENT)
Dept: ENDOCRINOLOGY | Facility: CLINIC | Age: 50
End: 2025-08-13

## 2025-08-13 ENCOUNTER — OFFICE VISIT (OUTPATIENT)
Dept: ENDOCRINOLOGY | Facility: CLINIC | Age: 50
End: 2025-08-13
Payer: COMMERCIAL

## 2025-08-13 VITALS
BODY MASS INDEX: 31.44 KG/M2 | TEMPERATURE: 99.2 F | WEIGHT: 224.6 LBS | SYSTOLIC BLOOD PRESSURE: 145 MMHG | OXYGEN SATURATION: 97 % | DIASTOLIC BLOOD PRESSURE: 95 MMHG | HEART RATE: 83 BPM | RESPIRATION RATE: 18 BRPM | HEIGHT: 71 IN

## 2025-08-13 DIAGNOSIS — E11.9 TYPE 2 DIABETES MELLITUS WITHOUT COMPLICATION, WITHOUT LONG-TERM CURRENT USE OF INSULIN (H): Primary | ICD-10-CM

## 2025-08-13 PROCEDURE — 3077F SYST BP >= 140 MM HG: CPT | Performed by: INTERNAL MEDICINE

## 2025-08-13 PROCEDURE — 99214 OFFICE O/P EST MOD 30 MIN: CPT | Performed by: INTERNAL MEDICINE

## 2025-08-13 PROCEDURE — 95251 CONT GLUC MNTR ANALYSIS I&R: CPT | Performed by: INTERNAL MEDICINE

## 2025-08-13 PROCEDURE — 3080F DIAST BP >= 90 MM HG: CPT | Performed by: INTERNAL MEDICINE

## 2025-08-13 PROCEDURE — 3044F HG A1C LEVEL LT 7.0%: CPT | Performed by: INTERNAL MEDICINE

## 2025-08-13 PROCEDURE — G2211 COMPLEX E/M VISIT ADD ON: HCPCS | Performed by: INTERNAL MEDICINE

## (undated) DEVICE — GLOVE PROTEXIS BLUE W/NEU-THERA 7.5  2D73EB75

## (undated) DEVICE — NDL 19GA 1.5"

## (undated) DEVICE — SUCTION TIP YANKAUER STR K87

## (undated) DEVICE — SYR 10ML LL W/O NDL 302995

## (undated) DEVICE — DRSG STERI STRIP 1/2X4" R1547

## (undated) DEVICE — ESU PENCIL W/SMOKE EVAC CVPLP2000

## (undated) DEVICE — SOL WATER IRRIG 1000ML BOTTLE 2F7114

## (undated) DEVICE — DRAPE LAP W/ARMBOARD 29410

## (undated) DEVICE — SU PDS II 2-0 CT-2 27"  Z333H

## (undated) DEVICE — SU VICRYL 4-0 PS-2 18" UND J496H

## (undated) DEVICE — NDL 22GA 1.5"

## (undated) DEVICE — GLOVE PROTEXIS W/NEU-THERA 7.5  2D73TE75

## (undated) DEVICE — DECANTER VIAL 2006S

## (undated) DEVICE — SU VICRYL 0 CT-2 27" J334H

## (undated) DEVICE — PACK MINOR SBA15MIFSE

## (undated) DEVICE — LINEN TOWEL PACK X5 5464

## (undated) DEVICE — SU VICRYL 3-0 SH 27" J316H

## (undated) DEVICE — ESU ELEC BLADE 2.75" COATED/INSULATED E1455

## (undated) DEVICE — PREP CHLORAPREP 26ML TINTED ORANGE  260815

## (undated) DEVICE — SYR EAR BULB 3OZ 0035830

## (undated) RX ORDER — PROPOFOL 10 MG/ML
INJECTION, EMULSION INTRAVENOUS
Status: DISPENSED
Start: 2020-05-29

## (undated) RX ORDER — ONDANSETRON 2 MG/ML
INJECTION INTRAMUSCULAR; INTRAVENOUS
Status: DISPENSED
Start: 2020-05-29

## (undated) RX ORDER — OXYCODONE AND ACETAMINOPHEN 5; 325 MG/1; MG/1
TABLET ORAL
Status: DISPENSED
Start: 2020-05-29

## (undated) RX ORDER — HYDROMORPHONE HYDROCHLORIDE 1 MG/ML
INJECTION, SOLUTION INTRAMUSCULAR; INTRAVENOUS; SUBCUTANEOUS
Status: DISPENSED
Start: 2020-05-29

## (undated) RX ORDER — BUPIVACAINE HYDROCHLORIDE AND EPINEPHRINE 2.5; 5 MG/ML; UG/ML
INJECTION, SOLUTION EPIDURAL; INFILTRATION; INTRACAUDAL; PERINEURAL
Status: DISPENSED
Start: 2020-05-29

## (undated) RX ORDER — DEXAMETHASONE SODIUM PHOSPHATE 4 MG/ML
INJECTION, SOLUTION INTRA-ARTICULAR; INTRALESIONAL; INTRAMUSCULAR; INTRAVENOUS; SOFT TISSUE
Status: DISPENSED
Start: 2020-05-29

## (undated) RX ORDER — LIDOCAINE HYDROCHLORIDE 20 MG/ML
INJECTION, SOLUTION EPIDURAL; INFILTRATION; INTRACAUDAL; PERINEURAL
Status: DISPENSED
Start: 2020-05-29

## (undated) RX ORDER — CEFAZOLIN SODIUM 2 G/100ML
INJECTION, SOLUTION INTRAVENOUS
Status: DISPENSED
Start: 2020-05-29

## (undated) RX ORDER — BUPIVACAINE HYDROCHLORIDE AND EPINEPHRINE 5; 5 MG/ML; UG/ML
INJECTION, SOLUTION EPIDURAL; INTRACAUDAL; PERINEURAL
Status: DISPENSED
Start: 2020-05-29

## (undated) RX ORDER — KETOROLAC TROMETHAMINE 30 MG/ML
INJECTION, SOLUTION INTRAMUSCULAR; INTRAVENOUS
Status: DISPENSED
Start: 2020-05-29

## (undated) RX ORDER — FENTANYL CITRATE 50 UG/ML
INJECTION, SOLUTION INTRAMUSCULAR; INTRAVENOUS
Status: DISPENSED
Start: 2020-05-29

## (undated) RX ORDER — HYDROXYZINE HYDROCHLORIDE 50 MG/ML
INJECTION, SOLUTION INTRAMUSCULAR
Status: DISPENSED
Start: 2020-05-29

## (undated) RX ORDER — LIDOCAINE HYDROCHLORIDE AND EPINEPHRINE 10; 10 MG/ML; UG/ML
INJECTION, SOLUTION INFILTRATION; PERINEURAL
Status: DISPENSED
Start: 2020-05-29

## (undated) RX ORDER — CEFAZOLIN SODIUM 1 G/3ML
INJECTION, POWDER, FOR SOLUTION INTRAMUSCULAR; INTRAVENOUS
Status: DISPENSED
Start: 2020-05-29